# Patient Record
Sex: FEMALE | Race: WHITE | NOT HISPANIC OR LATINO | URBAN - METROPOLITAN AREA
[De-identification: names, ages, dates, MRNs, and addresses within clinical notes are randomized per-mention and may not be internally consistent; named-entity substitution may affect disease eponyms.]

---

## 2019-06-24 ENCOUNTER — INPATIENT (INPATIENT)
Facility: HOSPITAL | Age: 69
LOS: 3 days | Discharge: HOME | DRG: 54 | End: 2019-06-28
Attending: HOSPITALIST | Admitting: HOSPITALIST
Payer: MEDICARE

## 2019-06-24 VITALS
RESPIRATION RATE: 20 BRPM | DIASTOLIC BLOOD PRESSURE: 70 MMHG | SYSTOLIC BLOOD PRESSURE: 139 MMHG | HEIGHT: 62 IN | WEIGHT: 130.07 LBS | OXYGEN SATURATION: 98 % | TEMPERATURE: 98 F | HEART RATE: 87 BPM

## 2019-06-24 DIAGNOSIS — G93.9 DISORDER OF BRAIN, UNSPECIFIED: ICD-10-CM

## 2019-06-24 DIAGNOSIS — G56.00 CARPAL TUNNEL SYNDROME, UNSPECIFIED UPPER LIMB: Chronic | ICD-10-CM

## 2019-06-24 LAB
ALBUMIN SERPL ELPH-MCNC: 4.1 G/DL — SIGNIFICANT CHANGE UP (ref 3.3–5.2)
ALP SERPL-CCNC: 76 U/L — SIGNIFICANT CHANGE UP (ref 40–120)
ALT FLD-CCNC: 18 U/L — SIGNIFICANT CHANGE UP
ANION GAP SERPL CALC-SCNC: 13 MMOL/L — SIGNIFICANT CHANGE UP (ref 5–17)
APTT BLD: 28.4 SEC — SIGNIFICANT CHANGE UP (ref 27.5–36.3)
AST SERPL-CCNC: 23 U/L — SIGNIFICANT CHANGE UP
BASOPHILS # BLD AUTO: 0 K/UL — SIGNIFICANT CHANGE UP (ref 0–0.2)
BASOPHILS NFR BLD AUTO: 0.2 % — SIGNIFICANT CHANGE UP (ref 0–2)
BILIRUB SERPL-MCNC: 0.4 MG/DL — SIGNIFICANT CHANGE UP (ref 0.4–2)
BUN SERPL-MCNC: 16 MG/DL — SIGNIFICANT CHANGE UP (ref 8–20)
CALCIUM SERPL-MCNC: 9.5 MG/DL — SIGNIFICANT CHANGE UP (ref 8.6–10.2)
CHLORIDE SERPL-SCNC: 106 MMOL/L — SIGNIFICANT CHANGE UP (ref 98–107)
CK SERPL-CCNC: 83 U/L — SIGNIFICANT CHANGE UP (ref 25–170)
CO2 SERPL-SCNC: 24 MMOL/L — SIGNIFICANT CHANGE UP (ref 22–29)
CREAT SERPL-MCNC: 0.92 MG/DL — SIGNIFICANT CHANGE UP (ref 0.5–1.3)
EOSINOPHIL # BLD AUTO: 0.1 K/UL — SIGNIFICANT CHANGE UP (ref 0–0.5)
EOSINOPHIL NFR BLD AUTO: 0.6 % — SIGNIFICANT CHANGE UP (ref 0–6)
GLUCOSE SERPL-MCNC: 110 MG/DL — SIGNIFICANT CHANGE UP (ref 70–115)
HCT VFR BLD CALC: 44.6 % — SIGNIFICANT CHANGE UP (ref 37–47)
HGB BLD-MCNC: 14.9 G/DL — SIGNIFICANT CHANGE UP (ref 12–16)
INR BLD: 1.03 RATIO — SIGNIFICANT CHANGE UP (ref 0.88–1.16)
LACTATE BLDV-MCNC: 1.3 MMOL/L — SIGNIFICANT CHANGE UP (ref 0.5–2)
LYMPHOCYTES # BLD AUTO: 1.8 K/UL — SIGNIFICANT CHANGE UP (ref 1–4.8)
LYMPHOCYTES # BLD AUTO: 15.6 % — LOW (ref 20–55)
MCHC RBC-ENTMCNC: 31.5 PG — HIGH (ref 27–31)
MCHC RBC-ENTMCNC: 33.4 G/DL — SIGNIFICANT CHANGE UP (ref 32–36)
MCV RBC AUTO: 94.3 FL — SIGNIFICANT CHANGE UP (ref 81–99)
MONOCYTES # BLD AUTO: 1.2 K/UL — HIGH (ref 0–0.8)
MONOCYTES NFR BLD AUTO: 9.8 % — SIGNIFICANT CHANGE UP (ref 3–10)
NEUTROPHILS # BLD AUTO: 8.7 K/UL — HIGH (ref 1.8–8)
NEUTROPHILS NFR BLD AUTO: 73.5 % — HIGH (ref 37–73)
PLATELET # BLD AUTO: 339 K/UL — SIGNIFICANT CHANGE UP (ref 150–400)
POTASSIUM SERPL-MCNC: 4.1 MMOL/L — SIGNIFICANT CHANGE UP (ref 3.5–5.3)
POTASSIUM SERPL-SCNC: 4.1 MMOL/L — SIGNIFICANT CHANGE UP (ref 3.5–5.3)
PROT SERPL-MCNC: 8 G/DL — SIGNIFICANT CHANGE UP (ref 6.6–8.7)
PROTHROM AB SERPL-ACNC: 11.9 SEC — SIGNIFICANT CHANGE UP (ref 10–12.9)
RBC # BLD: 4.73 M/UL — SIGNIFICANT CHANGE UP (ref 4.4–5.2)
RBC # FLD: 14.4 % — SIGNIFICANT CHANGE UP (ref 11–15.6)
SODIUM SERPL-SCNC: 143 MMOL/L — SIGNIFICANT CHANGE UP (ref 135–145)
TROPONIN T SERPL-MCNC: <0.01 NG/ML — SIGNIFICANT CHANGE UP (ref 0–0.06)
WBC # BLD: 11.9 K/UL — HIGH (ref 4.8–10.8)
WBC # FLD AUTO: 11.9 K/UL — HIGH (ref 4.8–10.8)

## 2019-06-24 PROCEDURE — 71045 X-RAY EXAM CHEST 1 VIEW: CPT | Mod: 26

## 2019-06-24 PROCEDURE — 70552 MRI BRAIN STEM W/DYE: CPT | Mod: 26

## 2019-06-24 PROCEDURE — 99223 1ST HOSP IP/OBS HIGH 75: CPT

## 2019-06-24 PROCEDURE — 99291 CRITICAL CARE FIRST HOUR: CPT

## 2019-06-24 PROCEDURE — 70450 CT HEAD/BRAIN W/O DYE: CPT | Mod: 26

## 2019-06-24 PROCEDURE — 99222 1ST HOSP IP/OBS MODERATE 55: CPT

## 2019-06-24 PROCEDURE — 71260 CT THORAX DX C+: CPT | Mod: 26

## 2019-06-24 PROCEDURE — 99223 1ST HOSP IP/OBS HIGH 75: CPT | Mod: AI

## 2019-06-24 PROCEDURE — 74177 CT ABD & PELVIS W/CONTRAST: CPT | Mod: 26

## 2019-06-24 RX ORDER — CEFTRIAXONE 500 MG/1
2000 INJECTION, POWDER, FOR SOLUTION INTRAMUSCULAR; INTRAVENOUS ONCE
Refills: 0 | Status: COMPLETED | OUTPATIENT
Start: 2019-06-24 | End: 2019-06-24

## 2019-06-24 RX ORDER — SODIUM CHLORIDE 9 MG/ML
1000 INJECTION, SOLUTION INTRAVENOUS
Refills: 0 | Status: DISCONTINUED | OUTPATIENT
Start: 2019-06-24 | End: 2019-06-26

## 2019-06-24 RX ORDER — LEVETIRACETAM 250 MG/1
1000 TABLET, FILM COATED ORAL ONCE
Refills: 0 | Status: COMPLETED | OUTPATIENT
Start: 2019-06-24 | End: 2019-06-24

## 2019-06-24 RX ORDER — ASPIRIN/CALCIUM CARB/MAGNESIUM 324 MG
325 TABLET ORAL ONCE
Refills: 0 | Status: COMPLETED | OUTPATIENT
Start: 2019-06-24 | End: 2019-06-24

## 2019-06-24 RX ORDER — VANCOMYCIN HCL 1 G
1000 VIAL (EA) INTRAVENOUS ONCE
Refills: 0 | Status: COMPLETED | OUTPATIENT
Start: 2019-06-24 | End: 2019-06-24

## 2019-06-24 RX ORDER — LEVETIRACETAM 250 MG/1
500 TABLET, FILM COATED ORAL
Refills: 0 | Status: DISCONTINUED | OUTPATIENT
Start: 2019-06-24 | End: 2019-06-28

## 2019-06-24 RX ORDER — MECLIZINE HCL 12.5 MG
25 TABLET ORAL ONCE
Refills: 0 | Status: COMPLETED | OUTPATIENT
Start: 2019-06-24 | End: 2019-06-24

## 2019-06-24 RX ORDER — ACETAMINOPHEN 500 MG
650 TABLET ORAL EVERY 6 HOURS
Refills: 0 | Status: DISCONTINUED | OUTPATIENT
Start: 2019-06-24 | End: 2019-06-28

## 2019-06-24 RX ORDER — SODIUM CHLORIDE 9 MG/ML
1000 INJECTION INTRAMUSCULAR; INTRAVENOUS; SUBCUTANEOUS ONCE
Refills: 0 | Status: COMPLETED | OUTPATIENT
Start: 2019-06-24 | End: 2019-06-24

## 2019-06-24 RX ORDER — PANTOPRAZOLE SODIUM 20 MG/1
40 TABLET, DELAYED RELEASE ORAL
Refills: 0 | Status: DISCONTINUED | OUTPATIENT
Start: 2019-06-24 | End: 2019-06-28

## 2019-06-24 RX ADMIN — Medication 325 MILLIGRAM(S): at 10:38

## 2019-06-24 RX ADMIN — SODIUM CHLORIDE 1000 MILLILITER(S): 9 INJECTION INTRAMUSCULAR; INTRAVENOUS; SUBCUTANEOUS at 10:38

## 2019-06-24 RX ADMIN — Medication 250 MILLIGRAM(S): at 12:36

## 2019-06-24 RX ADMIN — SODIUM CHLORIDE 60 MILLILITER(S): 9 INJECTION, SOLUTION INTRAVENOUS at 19:12

## 2019-06-24 RX ADMIN — SODIUM CHLORIDE 1000 MILLILITER(S): 9 INJECTION INTRAMUSCULAR; INTRAVENOUS; SUBCUTANEOUS at 14:24

## 2019-06-24 RX ADMIN — CEFTRIAXONE 100 MILLIGRAM(S): 500 INJECTION, POWDER, FOR SOLUTION INTRAMUSCULAR; INTRAVENOUS at 12:32

## 2019-06-24 RX ADMIN — LEVETIRACETAM 400 MILLIGRAM(S): 250 TABLET, FILM COATED ORAL at 14:24

## 2019-06-24 RX ADMIN — CEFTRIAXONE 2000 MILLIGRAM(S): 500 INJECTION, POWDER, FOR SOLUTION INTRAMUSCULAR; INTRAVENOUS at 12:36

## 2019-06-24 RX ADMIN — Medication 1 MILLIGRAM(S): at 10:35

## 2019-06-24 RX ADMIN — Medication 25 MILLIGRAM(S): at 10:35

## 2019-06-24 RX ADMIN — Medication 1000 MILLIGRAM(S): at 15:01

## 2019-06-24 RX ADMIN — LEVETIRACETAM 500 MILLIGRAM(S): 250 TABLET, FILM COATED ORAL at 19:12

## 2019-06-24 NOTE — H&P ADULT - HISTORY OF PRESENT ILLNESS
69 y/o F with pmh Carpal tunnel syndrome, former smoker quit 7-8 years ago, and wrist fracture repair in R hand; as per patient she was in her usual stat of health she was feeling like she is walking wobbly and feel like about to fall down and that feeling was worsening, she recently went to [place] on cruise from 6/9/2019-6/, on the second day of the cruise she felt some motion sickness but attributed this to the cruise, States she has been on other cruises without symptoms, few years ago [place] went [place] but not recently, she has no fever, chills, headache, problem speaking, blurry or double vision, she had no nausea, vomiting, she did not fall down, she has no weakness or numbness of any specific area of the body.

## 2019-06-24 NOTE — ED PROVIDER NOTE - CLINICAL SUMMARY MEDICAL DECISION MAKING FREE TEXT BOX
The patient presents with off balance sensation and CT showing R sided necrotic mass and will admit for further evaluation

## 2019-06-24 NOTE — CONSULT NOTE ADULT - SUBJECTIVE AND OBJECTIVE BOX
HPI:  The patient is a 68 year old female presents with dizziness described motion sickness as the patient went to [place] cruise 2 weeks ago and came back 5 days ago.  The patient's  noticed the patient to be little off on the gait and the patient felt poor coordination of the left side of body. No HA, No fever No CP, No SOB, No Abd pain  Pt states that she did not have any symptoms till the cruise which occurred dates from June 12 thru 19.  Pt states that during this time she noticed being off balanced somewhat weak and disoriented.   Pt gives hx of weight loss 30lbs which she attributes to watching her diet. Pt denies fevers, chills or night sweats. Pt denies memory or visual changes, but describes herself as being disoriented.         PAST MEDICAL & SURGICAL HISTORY:  No pertinent past medical history  Carpal tunnel syndrome -carpal tunnel surg on the left hand  Wrist repaired after fall on ther right.       REVIEW OF SYSTEMS:    CONSTITUTIONAL: No fever, weight loss, or fatigue  EYES: No eye pain, visual disturbances, or discharge, wears readers.   ENMT:  No difficulty hearing, tinnitus, vertigo; No sinus or throat pain  NECK: No pain or stiffness  BREASTS: No pain, masses, or nipple discharge  RESPIRATORY: No cough, wheezing, chills or hemoptysis; No shortness of breath  CARDIOVASCULAR: No chest pain, palpitations, dizziness, or leg swelling  GASTROINTESTINAL: No abdominal or epigastric pain. No nausea, vomiting, or hematemesis; No diarrhea or constipation. No melena or hematochezia.  GENITOURINARY: No dysuria, frequency, hematuria, or incontinence  NEUROLOGICAL: No headaches, memory loss, loss of strength, numbness, or tremors  SKIN: No itching, burning, rashes, or lesions   LYMPH NODES: No enlarged glands  ENDOCRINE: No heat or cold intolerance; No hair loss  MUSCULOSKELETAL: No joint pain or swelling; No muscle, back, or extremity pain  PSYCHIATRIC: No depression, anxiety, mood swings, or difficulty sleeping  HEME/LYMPH: No easy bruising, or bleeding gums  ALLERY AND IMMUNOLOGIC: No hives or eczema    Allergies  No Known Allergies  Intolerances      MEDICATIONS  (STANDING):    MEDICATIONS  (PRN):  acetaminophen   Tablet .. 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 ex smoker 1 ppd quit 7 years.   FAMILY HISTORY: Mom pos blood pressure    Vital Signs Last 24 Hrs  T(C): 36.7 (24 Jun 2019 11:42), Max: 36.7 (24 Jun 2019 09:39)  T(F): 98.1 (24 Jun 2019 11:42), Max: 98.1 (24 Jun 2019 11:42)  HR: 87 (24 Jun 2019 14:25) (78 - 87)  BP: 122/70 (24 Jun 2019 14:25) (122/70 - 139/70)  BP(mean): --  RR: 18 (24 Jun 2019 14:25) (18 - 20)  SpO2: 99% (24 Jun 2019 14:25) (96% - 99%)    PHYSICAL EXAM:  GCS 15  GENERAL: NAD, well-groomed, well-developed  HEAD:  Atraumatic, Normocephalic  EYES: EOMI, PERRLA, conjunctiva and sclera clear,   ENMT: No tonsillar erythema, exudates, or enlargement; Moist mucous membranes, Good dentition, No lesions, no facials  NECK: Supple, No JVD, Normal thyroid  NERVOUS SYSTEM:  Alert & Oriented X3, Good concentration; Motor Strength 5/5 B/L upper and lower extremities; DTRs 2+ intact and symmetric  CHEST/LUNG: Clear to percussion bilaterally; No rales, rhonchi, wheezing, or rubs  HEART: Regular rate and rhythm; No murmurs, rubs, or gallops  ABDOMEN: Soft, Nontender, Nondistended; Bowel sounds present  EXTREMITIES:  2+ Peripheral Pulses, No clubbing, cyanosis, or edema  LYMPH: No lymphadenopathy noted  SKIN: No rashes or lesions    LABS:                        14.9   11.9  )-----------( 339      ( 24 Jun 2019 09:59 )             44.6     06-24    143  |  106  |  16.0  ----------------------------<  110  4.1   |  24.0  |  0.92    Ca    9.5      24 Jun 2019 09:59    TPro  8.0  /  Alb  4.1  /  TBili  0.4  /  DBili  x   /  AST  23  /  ALT  18  /  AlkPhos  76  06-24    PT/INR - ( 24 Jun 2019 09:59 )   PT: 11.9 sec;   INR: 1.03 ratio         PTT - ( 24 Jun 2019 09:59 )  PTT:28.4 sec      RADIOLOGY & ADDITIONAL STUDIES:  ~~~~~~~~~~~~~~~~~~~~~~~~~~~~~~ HPI:  The patient is a 68 year old female presents with dizziness described motion sickness as the patient went to [place] cruise 2 weeks ago and came back 5 days ago.  The patient's  noticed the patient to be little off on the gait and the patient felt poor coordination of the left side of body. No HA, No fever No CP, No SOB, No Abd pain  Pt states that she did not have any symptoms till the cruise which occurred dates from June 12 thru 19.  Pt states that during this time she noticed being off balanced somewhat weak and disoriented.   Pt gives hx of weight loss 30lbs which she attributes to watching her diet. Pt denies fevers, chills or night sweats. Pt denies memory or visual changes, but describes herself as being disoriented.         PAST MEDICAL & SURGICAL HISTORY:  No pertinent past medical history  Carpal tunnel syndrome -carpal tunnel surg on the left hand  Wrist repaired after fall on ther right.       REVIEW OF SYSTEMS:    CONSTITUTIONAL: No fever, weight loss, or fatigue  EYES: No eye pain, visual disturbances, or discharge, wears readers.   ENMT:  No difficulty hearing, tinnitus, vertigo; No sinus or throat pain  NECK: No pain or stiffness  BREASTS: No pain, masses, or nipple discharge  RESPIRATORY: No cough, wheezing, chills or hemoptysis; No shortness of breath  CARDIOVASCULAR: No chest pain, palpitations, dizziness, or leg swelling  GASTROINTESTINAL: No abdominal or epigastric pain. No nausea, vomiting, or hematemesis; No diarrhea or constipation. No melena or hematochezia.  GENITOURINARY: No dysuria, frequency, hematuria, or incontinence  NEUROLOGICAL: No headaches, memory loss, loss of strength, numbness, or tremors  SKIN: No itching, burning, rashes, or lesions   LYMPH NODES: No enlarged glands  ENDOCRINE: No heat or cold intolerance; No hair loss  MUSCULOSKELETAL: No joint pain or swelling; No muscle, back, or extremity pain  PSYCHIATRIC: No depression, anxiety, mood swings, or difficulty sleeping  HEME/LYMPH: No easy bruising, or bleeding gums  ALLERY AND IMMUNOLOGIC: No hives or eczema    Allergies  No Known Allergies  Intolerances      MEDICATIONS  (STANDING):    MEDICATIONS  (PRN):  acetaminophen   Tablet .. 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 ex smoker 1 ppd quit 7 years.   FAMILY HISTORY: Mom pos blood pressure    Vital Signs Last 24 Hrs  T(C): 36.7 (24 Jun 2019 11:42), Max: 36.7 (24 Jun 2019 09:39)  T(F): 98.1 (24 Jun 2019 11:42), Max: 98.1 (24 Jun 2019 11:42)  HR: 87 (24 Jun 2019 14:25) (78 - 87)  BP: 122/70 (24 Jun 2019 14:25) (122/70 - 139/70)  BP(mean): --  RR: 18 (24 Jun 2019 14:25) (18 - 20)  SpO2: 99% (24 Jun 2019 14:25) (96% - 99%)    PHYSICAL EXAM:  GCS 15  GENERAL: NAD, well-groomed, well-developed  HEAD:  Atraumatic, Normocephalic  EYES: EOMI, PERRLA, conjunctiva and sclera clear,   ENMT: No tonsillar erythema, exudates, or enlargement; Moist mucous membranes, Good dentition, No lesions, no facials  NECK: Supple, No JVD, Normal thyroid  NERVOUS SYSTEM:  Alert & Oriented X3, Good concentration; Motor Strength 5/5 B/L upper and lower extremities; DTRs 2+ intact and symmetric, pos dysmetria of the left side.  neg pronators. pos finger to nose left. neg sensory.  CHEST/LUNG: Clear BS bilaterally; No rales, rhonchi, wheezing, or rubs  HEART: Regular rate and rhythm; No murmurs, rubs, or gallops  ABDOMEN: Soft, Nontender, Nondistended; Bowel sounds present  EXTREMITIES:  2+ Peripheral Pulses, No clubbing, cyanosis, or edema  LYMPH: No lymphadenopathy noted  SKIN: No rashes or lesions    LABS:                        14.9   11.9  )-----------( 339      ( 24 Jun 2019 09:59 )             44.6     06-24    143  |  106  |  16.0  ----------------------------<  110  4.1   |  24.0  |  0.92    Ca    9.5      24 Jun 2019 09:59    TPro  8.0  /  Alb  4.1  /  TBili  0.4  /  DBili  x   /  AST  23  /  ALT  18  /  AlkPhos  76  06-24    PT/INR - ( 24 Jun 2019 09:59 )   PT: 11.9 sec;   INR: 1.03 ratio         PTT - ( 24 Jun 2019 09:59 )  PTT:28.4 sec      RADIOLOGY & ADDITIONAL STUDIES:  ~~~~~~~~~~~~~~~~~~~~~~~~~~~~~~  < from: CT Head No Cont (06.24.19 @ 11:21) >   EXAM:  CT BRAIN                        PROCEDURE DATE:  06/24/2019    <IMPRESSION:   A 4.6 cm cystic/necrotic mass with nodular components in the right   posterior frontal and parietal region with involvement of the right   splenium of the corpus callosum. Extensive surrounding vasogenic edema.   Right to left midline shift measuring 2 mm.  < end of copied text >    < from: MR Head w/ IV Cont (06.24.19 @ 14:09) >  IMPRESSION:   A cystic/necrotic, hemorrhagic mass with enhancing nodularity in the   right posterior frontal and parietal region, with involvement of the   splenium of the corpus callosum and crossing the midline. Extensive   T2/FLAIR hyperintensity surrounding this lesion suggesting vasogenic   edema and/or nonenhancing tumor. Two smaller enhancing lesions in the   right frontal lobe and left occipital lobe. Findings suggest primary   malignancy such as GBM versus metastatic disease.  < end of copied text >    < from: Xray Chest 1 View-PORTABLE IMMEDIATE (06.24.19 @ 11:36) >   EXAM:  XR CHEST PORTABLE IMMED 1V                        PROCEDURE DATE:  06/24/2019    IMPRESSION: There is right lower lobe opacification compatible with   pneumonia. The leftlung is clear. The heart is normal in size.  < end of copied text >

## 2019-06-24 NOTE — ED ADULT NURSE NOTE - OBJECTIVE STATEMENT
dizzy, feels off balance, no chest pain, no dyspnea. Pt ambulates well. Recent cruise and air travel. No cardiac history.

## 2019-06-24 NOTE — H&P ADULT - NSHPPHYSICALEXAM_GEN_ALL_CORE
Vital Signs Last 24 Hrs  T(C): 37 (24 Jun 2019 16:14), Max: 37 (24 Jun 2019 16:14)  T(F): 98.6 (24 Jun 2019 16:14), Max: 98.6 (24 Jun 2019 16:14)  HR: 74 (24 Jun 2019 16:14) (74 - 87)  BP: 142/90 (24 Jun 2019 16:14) (122/70 - 142/90)  BP(mean): --  RR: 18 (24 Jun 2019 16:14) (18 - 20)  SpO2: 97% (24 Jun 2019 16:14) (96% - 99%)    PHYSICAL EXAM:    GENERAL: Elderly female looking comfortable   HEENT: PERRL, +EOMI  NECK: soft, Supple, No JVD,   CHEST/LUNG: Clear to auscultate bilaterally; No wheezing  HEART: S1S2+, Regular rate and rhythm; No murmurs  ABDOMEN: Soft, Nontender, Nondistended; Bowel sounds present  EXTREMITIES:  2+ Peripheral Pulses, No edema  SKIN: No rashes or lesions  NEURO: AAOX3, no focal deficits, no motor r sensory loss, imbalance with walker as per  PSYCH: normal mood Vital Signs Last 24 Hrs  T(C): 37 (24 Jun 2019 16:14), Max: 37 (24 Jun 2019 16:14)  T(F): 98.6 (24 Jun 2019 16:14), Max: 98.6 (24 Jun 2019 16:14)  HR: 74 (24 Jun 2019 16:14) (74 - 87)  BP: 142/90 (24 Jun 2019 16:14) (122/70 - 142/90)  RR: 18 (24 Jun 2019 16:14) (18 - 20)  SpO2: 97% (24 Jun 2019 16:14) (96% - 99%)    PHYSICAL EXAM:    GENERAL: Elderly female looking comfortable   HEENT: PERRL, +EOMI  NECK: soft, Supple, No JVD,   CHEST/LUNG: Clear to auscultate bilaterally; No wheezing  HEART: S1S2+, Regular rate and rhythm; No murmurs  ABDOMEN: Soft, Nontender, Nondistended; Bowel sounds present  EXTREMITIES:  2+ Peripheral Pulses, No edema  SKIN: No rashes or lesions  NEURO: AAOX3, no focal deficits, no motor r sensory loss, imbalance with walker as per ED physician.   PSYCH: normal mood

## 2019-06-24 NOTE — ED PROVIDER NOTE - OBJECTIVE STATEMENT
The patient is a 68 year old female presents with dizziness described motion sickness as the patient went to [place] 2 weeks ago and came back 5 days ago.  The patient's  noticed the patient to be little off on the gait and the patient felt poor coordination of the left side of body. No HA, No fever No CP, No SOB, No Abd pain

## 2019-06-24 NOTE — CONSULT NOTE ADULT - SUBJECTIVE AND OBJECTIVE BOX
[facility]  INFECTIOUS DISEASES AND INTERNAL MEDICINE at [facility]  =======================================================  Rc Elizabeth MD  Diplomates American Board of Internal Medicine and Infectious Diseases  Tel: [phone]      Fax: [phone]  =======================================================      [id number]  DHARA MARSH     CC: Patient is a 68y old  Female who presents with a chief complaint of cystic mass in brain (24 Jun 2019 15:15)    67y/o  Female with no significant medical history. Patient presents with dizziness initially starting about 2 weeks back when she travelled to [place] for the [place]. Patient was noted to be off balance with poor coordination. No fever or chills, headaches. Patient thought she was having symptoms of motion sickness. Patient also reports weight loss 30lbs which she attributes to watching her diet. In the ER patient was afebrile, leukocytosis to 11.9k. She was given a dose of Vancomycin and Ceftriaxone. ID input requested.       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  No earache, sore throat or runny nose.  CARDIOVASCULAR:  No pressure, squeezing, strangling, tightness, heaviness or aching about the chest, neck, axilla or epigastrium.  RESPIRATORY:  No cough, shortness of breath  GASTROINTESTINAL:  No nausea, vomiting or diarrhea.  GENITOURINARY:  No dysuria, frequency or urgency.   MUSCULOSKELETAL:  no joint aches, no muscle pain  SKIN:  No change in skin, hair or nails.  NEUROLOGIC:  No Headaches, seizures or weakness.  PSYCHIATRIC:  No disorder of thought or mood.  ENDOCRINE:  No heat or cold intolerance  HEMATOLOGICAL:  No easy bruising or bleeding.       Physical Exam:  Vital Signs Last 24 Hrs  T(C): 37 (24 Jun 2019 16:14), Max: 37 (24 Jun 2019 16:14)  T(F): 98.6 (24 Jun 2019 16:14), Max: 98.6 (24 Jun 2019 16:14)  HR: 74 (24 Jun 2019 16:14) (74 - 87)  BP: 142/90 (24 Jun 2019 16:14) (122/70 - 142/90)  RR: 18 (24 Jun 2019 16:14) (18 - 20)  SpO2: 97% (24 Jun 2019 16:14) (96% - 99%)  Height (cm): 157.48 (06-24 @ 09:39)  Weight (kg): 59 (06-24 @ 09:39)  BMI (kg/m2): 23.8 (06-24 @ 09:39)  BSA (m2): 1.59 (06-24 @ 09:39)      GEN: NAD, pleasant  HEENT: normocephalic and atraumatic. EOMI. PERRL.  Anicteric  NECK: Supple.   LUNGS: Clear to auscultation.  HEART: Regular rate and rhythm   ABDOMEN: Soft, nontender, and nondistended.  Positive bowel sounds.    : No CVA tenderness  EXTREMITIES: Without any edema.  MSK: No joint swelling  NEUROLOGIC:  No Focal Deficits  PSYCHIATRIC: Appropriate affect .  SKIN: No Rash      Labs:  06-24    143  |  106  |  16.0  ----------------------------<  110  4.1   |  24.0  |  0.92    Ca    9.5      24 Jun 2019 09:59    TPro  8.0  /  Alb  4.1  /  TBili  0.4  /  DBili  x   /  AST  23  /  ALT  18  /  AlkPhos  76  06-24                          14.9   11.9  )-----------( 339      ( 24 Jun 2019 09:59 )             44.6       PT/INR - ( 24 Jun 2019 09:59 )   PT: 11.9 sec;   INR: 1.03 ratio         PTT - ( 24 Jun 2019 09:59 )  PTT:28.4 sec    LIVER FUNCTIONS - ( 24 Jun 2019 09:59 )  Alb: 4.1 g/dL / Pro: 8.0 g/dL / ALK PHOS: 76 U/L / ALT: 18 U/L / AST: 23 U/L / GGT: x           CARDIAC MARKERS ( 24 Jun 2019 09:59 )  x     / <0.01 ng/mL / 83 U/L / x     / x            EXAM:  MR BRAIN IC                        PROCEDURE DATE:  06/24/2019    INTERPRETATION:  CLINICAL INFORMATION: mass vs infection. . ADMDIAG1:   G93.9 DISORDER OF BRAIN, UNSPECIFIED/.  TECHNIQUE: Multiplanar, multisequence brain MRI was performed without   intravenous contrast  COMPARISON: CT head 6/24/2019.  FINDINGS:  Some images are degraded by motion.  Redemonstration of a cystic/necrotic mass with enhancing nodularity in   the right posterior frontal and parietal region measuring 4.8 x 3.8 x 3.2   cm. The enhancing nodular component along the medial aspect of the mass   involves the splenium of the corpus callosum and crosses the midline.   There is extensive T2/FLAIR hyperintensity surrounding this mass   suggestingvasogenic edema and/or nonenhancing tumor. There are foci of   susceptibility artifact in the lesion compatible with blood products. The   cystic component of the mass contains layering hemorrhage. The solid   enhancing component of this lesion demonstrates restricted diffusion   suggesting hypercellularity.  Two smaller enhancing lesions are noted,   one in the high right frontal lobe measuring 0.8 x 0.7 x 0.9 cm (10:26)   and the other in the left occipital lobe measuring 0.5 x 0.4 x 0.4 cm (10:13).   There is cerebral volume loss.  There is no evidence of acute infarct.   Scattered foci of T2/FLAIR hyperintensity in the bilateral   periventricular white matter are nonspecific but likely related to   chronic white matter microvascular ischemic disease.  Flow voids of the major intracranial vessels at the skull base follow   expected course and contour.  Mild bilateral maxillary sinus mucosal thickening. Mild bilateral mastoid   mucosal thickening. Bilateral orbits are within normal limits.  IMPRESSION:   A cystic/necrotic, hemorrhagic mass with enhancing nodularity in the   right posterior frontal and parietal region, with involvement of the   splenium of the corpus callosum and crossing the midline. Extensive   T2/FLAIR hyperintensity surrounding this lesion suggesting vasogenic   edema and/or nonenhancing tumor. Two smaller enhancing lesions in the   right frontal lobe and left occipital lobe. Findings suggest primary   malignancy such as GBM versus metastatic disease.

## 2019-06-24 NOTE — CONSULT NOTE ADULT - ATTENDING COMMENTS
NSGY Attg:    see above    patient seen    agree with exam as above    CT and MRI reviewed; CT torso pending    I discussed the clinical case and imaging findings with the patient's  at the bedside and answered all his questions.

## 2019-06-24 NOTE — CONSULT NOTE ADULT - SUBJECTIVE AND OBJECTIVE BOX
REASON FOR CONSULT: admission initiation    SUBJECTIVE:  69 yo F with pmh ...  who p/w worsening dizziness and gait stability for past...         OBJECTIVE  ROS: unremarkable except that stated in HPI    PHYSICAL EXAM: Tele-evaluation precludes physical exam. Pertinent physical exam findings as per verbal communication by …… and nurse at bedside are as following:  (***)    LABS AND IMAGING DATA:                        14.9   11.9  )-----------( 339      ( 24 Jun 2019 09:59 )             44.6     06-24    143  |  106  |  16.0  ----------------------------<  110  4.1   |  24.0  |  0.92    Ca    9.5      24 Jun 2019 09:59    TPro  8.0  /  Alb  4.1  /  TBili  0.4  /  DBili  x   /  AST  23  /  ALT  18  /  AlkPhos  76  06-24          ASSESSMENT AND PLAN: (***)    Care plan discussed with (***) REASON FOR CONSULT: admission initiation    SUBJECTIVE:  67 yo F with pmh Carpal tunnel syndrome, former smoker quit 7-8 years ago, and wrist fracture repair in R hand;  who p/w worsening balance and motion-like sickness since last tuesday. Pt was recently in [place] for a cruise from 6/9/2019-6/, on the second day of the cruise she felt some motion sickness but attributed this to the cruise. States she has been on other cruises without symptoms. Pt states she sees Dr. Bhagat in [facility] for her primary care needs. Pt is accompanied by her  who is also her HCP at bedside.     In the ED pt had CT head, as well as a dose of keppra.       OBJECTIVE  ROS: unremarkable except that stated in HPI    PHYSICAL EXAM: Tele-evaluation precludes physical exam. Pertinent physical exam findings as per verbal communication by patient and  at bedside are as following: AAOX3, pleasant and cooperative in history, able to speak in full sentences without difficulty.     LABS AND IMAGING DATA:                        14.9   11.9  )-----------( 339      ( 24 Jun 2019 09:59 )             44.6     06-24    143  |  106  |  16.0  ----------------------------<  110  4.1   |  24.0  |  0.92    Ca    9.5      24 Jun 2019 09:59    TPro  8.0  /  Alb  4.1  /  TBili  0.4  /  DBili  x   /  AST  23  /  ALT  18  /  AlkPhos  76  06-24      CT Head: 4.6 cm cystic/necrotic mass with nodular components in the right   posterior frontal and parietal region with involvement of the right   splenium of the corpus callosum. Extensive surrounding vasogenic edema.   Right to left midline shift measuring 2 mm. REASON FOR CONSULT: admission initiation    SUBJECTIVE:  69 yo F with pmh Carpal tunnel syndrome, former smoker quit 7-8 years ago, and wrist fracture repair in R hand;  who p/w worsening balance and motion-like sickness since last tuesday. Pt was recently in [place] for a cruise from 6/9/2019-6/, on the second day of the cruise she felt some motion sickness but attributed this to the cruise. States she has been on other cruises without symptoms. Pt states she sees Dr. Bhagat in [facility] for her primary care needs. Pt has had all her health maintence check ups which have been unremarkable until this point.  Pt is accompanied by her  who is also her HCP at bedside.     In the ED pt had CT head, as well as a dose of keppra.       OBJECTIVE  ROS: unremarkable except that stated in HPI    PHYSICAL EXAM: Tele-evaluation precludes physical exam. Pertinent physical exam findings as per verbal communication by patient and  at bedside are as following: AAOX3, pleasant and cooperative in history, able to speak in full sentences without difficulty.     LABS AND IMAGING DATA:                        14.9   11.9  )-----------( 339      ( 24 Jun 2019 09:59 )             44.6     06-24    143  |  106  |  16.0  ----------------------------<  110  4.1   |  24.0  |  0.92    Ca    9.5      24 Jun 2019 09:59    TPro  8.0  /  Alb  4.1  /  TBili  0.4  /  DBili  x   /  AST  23  /  ALT  18  /  AlkPhos  76  06-24      CT Head: 4.6 cm cystic/necrotic mass with nodular components in the right   posterior frontal and parietal region with involvement of the right   splenium of the corpus callosum. Extensive surrounding vasogenic edema.   Right to left midline shift measuring 2 mm. REASON FOR CONSULT: admission initiation    SUBJECTIVE:  67 yo F with pmh Carpal tunnel syndrome, former smoker quit 7-8 years ago, and wrist fracture repair in R hand;  who p/w worsening balance and motion-like sickness since last tuesday. Pt was recently in [place] for a cruise from 6/9/2019-6/, on the second day of the cruise she felt some motion sickness but attributed this to the cruise. States she has been on other cruises without symptoms. Pt states she sees Dr. Bhagat in [facility] for her primary care needs. Pt has had all her health maintence check ups which have been unremarkable until this point.  Pt is accompanied by her  who is also her HCP at bedside.     In the ED pt had CT head, as well as a dose of keppra.       OBJECTIVE  ROS: unremarkable except that stated in HPI    PHYSICAL EXAM: Tele-evaluation precludes physical exam. Pertinent physical exam findings as per verbal communication by patient and  at bedside are as following: AAOX3, pleasant and cooperative in history, able to speak in full sentences without difficulty.       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 Extensive surrounding vasogenic edema.   Right to left midline shift measuring 2 mm.

## 2019-06-24 NOTE — H&P ADULT - ASSESSMENT
68yf Right handed presented ED with Left sided uncoordination disoriented at time and overall feeling weak.  Pt just got back from Cruise and initially attributed the symptoms to being sea sick.   ct/ MRI of the brain demonstrates a cystic necrotic hemorrhagic mass with nodularity in the right posterior frontal parietal region involvement splenium or the corpus callosum crossing midline.  Positive extensive vasogenic edema. @ additional small enhancing lesion noted in the right frontal and left occipital lobe.     chest xray pos for right lower lobe opacification ? pneumonia 69 y/o F with pmh Carpal tunnel syndrome, former smoker quit 7-8 years ago, and wrist fracture repair in R hand; as per patient she was in her usual stat of health she was feeling like she is walking wobbly and feel like about to fall down and that feeling was worsening, she recently went to [place] on cruise from 6/9/2019-6/, on the second day of the cruise she felt some motion sickness but attributed this to the cruise, States she has been on other cruises without symptoms, few years ago she went to [employer] but not recently, she has no fever, chills, headache, problem speaking, blurry or double vision, she had no nausea, vomiting, she did not fall down, she has no weakness or numbness of any specific area of the body.    ct/ MRI of the brain demonstrates a cystic necrotic hemorrhagic mass with nodularity in the right posterior frontal parietal region involvement splenium or the corpus callosum crossing midline.  Positive extensive vasogenic edema. @ additional small enhancing lesion noted in the right frontal and left occipital lobe, chest xray pos for right lower lobe opacification ? pneumonia/mass    Plan:     Cystic brain mass: Admit in step down, Q2 Neuo checks, Kepra 500mg BID, Neruo surgical eval, ID eval to r/o infectious reason, will get HIV test verbal consent obtained from the patient, Onocology consulted, CT of the chest/abdomen/pelvis with IV contrast, seizure precaution, fall precaution, PT consult.   will hold of on IV steriods for now as discussed with Neuro surgery.     Opacities on the lung XR ?Pneumonia:  patient has no cough, no fever, no sob, will get CT chest to under stand more, blood cultures obtained, will hold antibiotics.     DVT prophylaxis: SCDs for now.      Addendum: Patient CT of the chest showed: There is a large RIGHT lower lobe lobulated heterogeneous hypervascular mass extending from the hilum to the posterior sulcus of RIGHT lower lobe measuring 9.3 x 4.0 x 7.8 cm and concerning for adenocarcinoma of the RIGHT lower lobe. The mass extends extends into the RIGHT hilum encasing the RIGHT lower lobe pulmonary arteries and occluding the RIGHT lower lobe bronchus. Peripheral basilar satellite lesions noted adjacent to the main mass, will keep her NPO mid night for IR guided biopsy in am after discussion with Interventional radiologist.

## 2019-06-24 NOTE — H&P ADULT - NSHPREVIEWOFSYSTEMS_GEN_ALL_CORE
CONSTITUTIONAL: No fever, some fatigue  RESPIRATORY: No cough,  No shortness of breath  CARDIOVASCULAR: No chest pain, palpitations  GASTROINTESTINAL: No abdominal, No nausea, vomiting  NEUROLOGICAL: gait disturbance.  MISCELLANEOUS: No joint swelling or pain CONSTITUTIONAL: No fever, some fatigue  RESPIRATORY: No cough,  No shortness of breath  CARDIOVASCULAR: No chest pain, palpitations  GASTROINTESTINAL: No abdominal, No nausea, vomiting  NEUROLOGICAL: gait disturbance.  MISCELLANEOUS: No joint swelling or pain  all systems reviewed negative except mentioned above.

## 2019-06-24 NOTE — H&P ADULT - NSHPLABSRESULTS_GEN_ALL_CORE
14.9   11.9  )-----------( 339      ( 24 Jun 2019 09:59 )             44.6     06-24    143  |  106  |  16.0  ----------------------------<  110  4.1   |  24.0  |  0.92    Ca    9.5      24 Jun 2019 09:59    TPro  8.0  /  Alb  4.1  /  TBili  0.4  /  DBili  x   /  AST  23  /  ALT  18  /  AlkPhos  76  06-24    PT/INR - ( 24 Jun 2019 09:59 )   PT: 11.9 sec;   INR: 1.03 ratio         PTT - ( 24 Jun 2019 09:59 )  PTT:28.4 sec    patient had ct/ MRI of the brain demonstrates a cystic necrotic hemorrhagic mass with nodularity in the right posterior frontal parietal region involvement splenium or the corpus callosum crossing midline.  Positive extensive vasogenic edema. @ additional small enhancing lesion noted in the right frontal and left occipital lobe.     chest xray pos for right lower lobe opacification ? pneumonia

## 2019-06-24 NOTE — CONSULT NOTE ADULT - NSHPATTENDINGPLANDISCUSS_GEN_ALL_CORE
Dr Meraz
Dr Meraz onsite accepting/admitting hospitalist, Dr Laurent (ED attending)
attending hosp, ed attending and patient and

## 2019-06-24 NOTE — ED PROVIDER NOTE - CHPI ED SYMPTOMS NEG
no fever/no vomiting/no nausea/no weakness/no numbness/no change in level of consciousness/no blurred vision/no confusion/no loss of consciousness

## 2019-06-25 PROBLEM — Z00.00 ENCOUNTER FOR PREVENTIVE HEALTH EXAMINATION: Status: ACTIVE | Noted: 2019-06-25

## 2019-06-25 LAB
ALBUMIN SERPL ELPH-MCNC: 3.2 G/DL — LOW (ref 3.3–5.2)
ALP SERPL-CCNC: 63 U/L — SIGNIFICANT CHANGE UP (ref 40–120)
ALT FLD-CCNC: 14 U/L — SIGNIFICANT CHANGE UP
ANION GAP SERPL CALC-SCNC: 12 MMOL/L — SIGNIFICANT CHANGE UP (ref 5–17)
AST SERPL-CCNC: 17 U/L — SIGNIFICANT CHANGE UP
BASOPHILS # BLD AUTO: 0 K/UL — SIGNIFICANT CHANGE UP (ref 0–0.2)
BASOPHILS NFR BLD AUTO: 0.3 % — SIGNIFICANT CHANGE UP (ref 0–2)
BILIRUB SERPL-MCNC: 0.4 MG/DL — SIGNIFICANT CHANGE UP (ref 0.4–2)
BUN SERPL-MCNC: 11 MG/DL — SIGNIFICANT CHANGE UP (ref 8–20)
CALCIUM SERPL-MCNC: 8.5 MG/DL — LOW (ref 8.6–10.2)
CHLORIDE SERPL-SCNC: 108 MMOL/L — HIGH (ref 98–107)
CO2 SERPL-SCNC: 23 MMOL/L — SIGNIFICANT CHANGE UP (ref 22–29)
CREAT SERPL-MCNC: 0.84 MG/DL — SIGNIFICANT CHANGE UP (ref 0.5–1.3)
EOSINOPHIL # BLD AUTO: 0.2 K/UL — SIGNIFICANT CHANGE UP (ref 0–0.5)
EOSINOPHIL NFR BLD AUTO: 1.8 % — SIGNIFICANT CHANGE UP (ref 0–6)
GLUCOSE SERPL-MCNC: 89 MG/DL — SIGNIFICANT CHANGE UP (ref 70–115)
HCT VFR BLD CALC: 38.7 % — SIGNIFICANT CHANGE UP (ref 37–47)
HGB BLD-MCNC: 12.7 G/DL — SIGNIFICANT CHANGE UP (ref 12–16)
HIV 1 & 2 AB SERPL IA.RAPID: SIGNIFICANT CHANGE UP
INR BLD: 1.09 RATIO — SIGNIFICANT CHANGE UP (ref 0.88–1.16)
LDH SERPL L TO P-CCNC: 243 U/L — HIGH (ref 98–192)
LYMPHOCYTES # BLD AUTO: 2.7 K/UL — SIGNIFICANT CHANGE UP (ref 1–4.8)
LYMPHOCYTES # BLD AUTO: 27.9 % — SIGNIFICANT CHANGE UP (ref 20–55)
MCHC RBC-ENTMCNC: 30.8 PG — SIGNIFICANT CHANGE UP (ref 27–31)
MCHC RBC-ENTMCNC: 32.8 G/DL — SIGNIFICANT CHANGE UP (ref 32–36)
MCV RBC AUTO: 93.9 FL — SIGNIFICANT CHANGE UP (ref 81–99)
MONOCYTES # BLD AUTO: 1 K/UL — HIGH (ref 0–0.8)
MONOCYTES NFR BLD AUTO: 10.3 % — HIGH (ref 3–10)
NEUTROPHILS # BLD AUTO: 5.7 K/UL — SIGNIFICANT CHANGE UP (ref 1.8–8)
NEUTROPHILS NFR BLD AUTO: 59.6 % — SIGNIFICANT CHANGE UP (ref 37–73)
PLATELET # BLD AUTO: 275 K/UL — SIGNIFICANT CHANGE UP (ref 150–400)
POTASSIUM SERPL-MCNC: 4 MMOL/L — SIGNIFICANT CHANGE UP (ref 3.5–5.3)
POTASSIUM SERPL-SCNC: 4 MMOL/L — SIGNIFICANT CHANGE UP (ref 3.5–5.3)
PROCALCITONIN SERPL-MCNC: 0.16 NG/ML — HIGH (ref 0.02–0.1)
PROT SERPL-MCNC: 6.6 G/DL — SIGNIFICANT CHANGE UP (ref 6.6–8.7)
PROTHROM AB SERPL-ACNC: 12.6 SEC — SIGNIFICANT CHANGE UP (ref 10–12.9)
RBC # BLD: 4.12 M/UL — LOW (ref 4.4–5.2)
RBC # FLD: 14.3 % — SIGNIFICANT CHANGE UP (ref 11–15.6)
SODIUM SERPL-SCNC: 143 MMOL/L — SIGNIFICANT CHANGE UP (ref 135–145)
WBC # BLD: 9.6 K/UL — SIGNIFICANT CHANGE UP (ref 4.8–10.8)
WBC # FLD AUTO: 9.6 K/UL — SIGNIFICANT CHANGE UP (ref 4.8–10.8)

## 2019-06-25 PROCEDURE — 99232 SBSQ HOSP IP/OBS MODERATE 35: CPT

## 2019-06-25 PROCEDURE — 99233 SBSQ HOSP IP/OBS HIGH 50: CPT

## 2019-06-25 RX ORDER — DEXAMETHASONE 0.5 MG/5ML
4 ELIXIR ORAL EVERY 6 HOURS
Refills: 0 | Status: DISCONTINUED | OUTPATIENT
Start: 2019-06-25 | End: 2019-06-28

## 2019-06-25 RX ADMIN — SODIUM CHLORIDE 60 MILLILITER(S): 9 INJECTION, SOLUTION INTRAVENOUS at 05:15

## 2019-06-25 RX ADMIN — LEVETIRACETAM 500 MILLIGRAM(S): 250 TABLET, FILM COATED ORAL at 05:15

## 2019-06-25 RX ADMIN — PANTOPRAZOLE SODIUM 40 MILLIGRAM(S): 20 TABLET, DELAYED RELEASE ORAL at 05:15

## 2019-06-25 RX ADMIN — SODIUM CHLORIDE 60 MILLILITER(S): 9 INJECTION, SOLUTION INTRAVENOUS at 18:23

## 2019-06-25 RX ADMIN — LEVETIRACETAM 500 MILLIGRAM(S): 250 TABLET, FILM COATED ORAL at 18:21

## 2019-06-25 RX ADMIN — Medication 4 MILLIGRAM(S): at 18:21

## 2019-06-25 NOTE — PHYSICAL THERAPY INITIAL EVALUATION ADULT - GAIT DEVIATIONS NOTED, PT EVAL
footdrop/decreased stride length/increased stride width/decreased weight-shifting ability/decreased step length

## 2019-06-25 NOTE — PHYSICAL THERAPY INITIAL EVALUATION ADULT - ADDITIONAL COMMENTS
Pt lives with  in a 2 story house with 2 steps to enter.  Independent with all, PTA, without devices.

## 2019-06-25 NOTE — PROGRESS NOTE ADULT - ASSESSMENT
69y/o  Female with no significant medical history. Patient presents with dizziness initially starting about 2 weeks back when she travelled to [place] [employer]. Patient was noted to be off balance with poor coordination. No fever or chills, headaches. Patient thought she was having symptoms of motion sickness. Patient also reports weight loss 30lbs which she attributes to watching her diet. In the ER patient was afebrile, leukocytosis to 11.9k      Brain mass  Lung mass  Leukocytosis      - Blood cultures pending  - MRI brain with cystic/necrotic, hemorrhagic mass  - CT Chest with lung mass  - HIV negative  - Hep C pending  - Procalcitonin level 0.16 not suggestive of infection   - Monitor off antibiotics   - Trend Fever  - Trend Leukocytosis  - Neurosurgery following      Will Follow

## 2019-06-25 NOTE — PROGRESS NOTE ADULT - ASSESSMENT
67 y/o F with pmh Carpal tunnel syndrome, former smoker quit 7-8 years ago, and wrist fracture repair in R hand; as per patient she was in her usual stat of health she was feeling like she is walking wobbly and feel like about to fall down and that feeling was worsening, she recently went to [place] on cruise from 6/9/2019-6/, on the second day of the cruise she felt some motion sickness but attributed this to the cruise, States she has been on other cruises without symptoms, few years ago she went to [employer] but not recently, she has no fever, chills, headache, problem speaking, blurry or double vision, she had no nausea, vomiting, she did not fall down, she has no weakness or numbness of any specific area of the body, ct/ MRI of the brain demonstrates a cystic necrotic hemorrhagic mass with nodularity in the right posterior frontal parietal region involvement splenium or the corpus callosum crossing midline.  Positive extensive vasogenic edema. @ additional small enhancing lesion noted in the right frontal and left occipital lobe, chest xray pos for right lower lobe opacification ? pneumonia/mass    Plan:     Cystic brain mass: Admit in step down, Q2 Neuo checks, Kepra 500mg BID, Neruo surgical eval, ID eval to r/o infectious reason, will get HIV test verbal consent obtained from the patient, Onocology consult appreciated, CT of the chest/abdomen/pelvis with IV contrast done, seizure precaution, fall precaution, PT consult, will change Neuro checks as per neurosurgery team  will hold of on IV steriods for now as discussed with Neuro surgery     Lung mass:  CT of the chest showed: There is a large RIGHT lower lobe lobulated heterogeneous hypervascular mass extending from the hilum to the posterior sulcus of RIGHT lower lobe measuring 9.3 x 4.0 x 7.8 cm and concerning for adenocarcinoma of the RIGHT lower lobe. The mass extends extends into the RIGHT hilum encasing the RIGHT lower lobe pulmonary arteries and occluding the RIGHT lower lobe bronchus. Peripheral basilar satellite lesions noted adjacent to the main mass, will keep her NPO mid night for IR guided biopsy in am, discussed with Interventional radiologist, no pneumonia on CT scan.   will add on LDH level.        DVT prophylaxis: SCDs for now as there is hemorrhagic component of the brain mass and patient is going for the biopsy in am.

## 2019-06-25 NOTE — PROGRESS NOTE ADULT - ASSESSMENT
68ym s/p 68yf right handed female arrives from vacation and feels uncoordinated, disoriented, and overall weak.    Pt is an ex smoker,  She has had a 30 lb weight loss in the pass 2months which she has attributed to a total diet change.  MRI of the brain demonstrates a right post frontal and region cystic mass with other tiny lesion in the right frontal and left occipital region.  Ct of the chest demonstrates a large right lower mass     Plan  1 Pt will undergo an intervention radiology procedure of a needle guided biopsy in the am  2. Pt and  has met with Oncology Consult - Dr Agustin  3. Pt will be started on Decadron 10 mg x1 then 4mg q 6  4. Pt will be followed neurosurgically yet, if path is obtained via the IR procedure will most likely hold off on any Neurosurgical BX  5. Dr Talbot has reviewed all findings and all recommendations are as above.

## 2019-06-25 NOTE — PHYSICAL THERAPY INITIAL EVALUATION ADULT - PLANNED THERAPY INTERVENTIONS, PT EVAL
transfer training/gait training/motor coordination training/strengthening/balance training/bed mobility training

## 2019-06-26 ENCOUNTER — RESULT REVIEW (OUTPATIENT)
Age: 69
End: 2019-06-26

## 2019-06-26 LAB
GLUCOSE BLDC GLUCOMTR-MCNC: 122 MG/DL — HIGH (ref 70–99)
HCV AB S/CO SERPL IA: 0.19 S/CO — SIGNIFICANT CHANGE UP (ref 0–0.99)
HCV AB SERPL-IMP: SIGNIFICANT CHANGE UP

## 2019-06-26 PROCEDURE — 99232 SBSQ HOSP IP/OBS MODERATE 35: CPT

## 2019-06-26 PROCEDURE — 88342 IMHCHEM/IMCYTCHM 1ST ANTB: CPT | Mod: 26

## 2019-06-26 PROCEDURE — 88305 TISSUE EXAM BY PATHOLOGIST: CPT | Mod: 26

## 2019-06-26 PROCEDURE — 32405: CPT | Mod: RT

## 2019-06-26 PROCEDURE — 88341 IMHCHEM/IMCYTCHM EA ADD ANTB: CPT | Mod: 26

## 2019-06-26 PROCEDURE — 88333 PATH CONSLTJ SURG CYTO XM 1: CPT | Mod: 26

## 2019-06-26 PROCEDURE — 77012 CT SCAN FOR NEEDLE BIOPSY: CPT | Mod: 26

## 2019-06-26 PROCEDURE — 99223 1ST HOSP IP/OBS HIGH 75: CPT

## 2019-06-26 RX ADMIN — LEVETIRACETAM 500 MILLIGRAM(S): 250 TABLET, FILM COATED ORAL at 07:18

## 2019-06-26 RX ADMIN — PANTOPRAZOLE SODIUM 40 MILLIGRAM(S): 20 TABLET, DELAYED RELEASE ORAL at 07:20

## 2019-06-26 RX ADMIN — Medication 4 MILLIGRAM(S): at 00:01

## 2019-06-26 RX ADMIN — SODIUM CHLORIDE 60 MILLILITER(S): 9 INJECTION, SOLUTION INTRAVENOUS at 00:01

## 2019-06-26 RX ADMIN — Medication 4 MILLIGRAM(S): at 21:50

## 2019-06-26 RX ADMIN — Medication 4 MILLIGRAM(S): at 17:04

## 2019-06-26 RX ADMIN — Medication 4 MILLIGRAM(S): at 07:19

## 2019-06-26 RX ADMIN — LEVETIRACETAM 500 MILLIGRAM(S): 250 TABLET, FILM COATED ORAL at 17:04

## 2019-06-26 RX ADMIN — Medication 4 MILLIGRAM(S): at 11:41

## 2019-06-26 NOTE — PROGRESS NOTE ADULT - ASSESSMENT
69 y/o F with pmh Carpal tunnel syndrome, former smoker quit 7-8 years ago, and wrist fracture repair in R hand; as per patient she was in her usual stat of health she was feeling like she is walking wobbly and feel like about to fall down and that feeling was worsening, she recently went to [place] on cruise from 6/9/2019-6/, on the second day of the cruise she felt some motion sickness but attributed this to the cruise, States she has been on other cruises without symptoms, few years ago she went to [employer] but not recently, she has no fever, chills, headache, problem speaking, blurry or double vision, she had no nausea, vomiting, she did not fall down, she has no weakness or numbness of any specific area of the body, ct/ MRI of the brain demonstrates a cystic necrotic hemorrhagic mass with nodularity in the right posterior frontal parietal region involvement splenium or the corpus callosum crossing midline.  Positive extensive vasogenic edema. @ additional small enhancing lesion noted in the right frontal and left occipital lobe, chest xray pos for right lower lobe opacification ? pneumonia/mass    Plan:     Cystic brain mass: Admited in step down, no any neurological deficit or any new changes in the neuro exam, will change neuro checks to Q6 Neuo checks, Kepra 500mg BID, Neruo surgical team is following, ID eval appreciated, HIV test is negative, Onocology consult appreciated, CT of the chest/abdomen/pelvis with IV contrast done results reviewed, seizure precaution, fall precaution, PT consulted, will transfer patient to any bed, patient has been started on IV steriods by Neuro surgical team.     Lung mass:  CT of the chest showed: There is a large RIGHT lower lobe lobulated heterogeneous hypervascular mass extending from the hilum to the posterior sulcus of RIGHT lower lobe measuring 9.3 x 4.0 x 7.8 cm and concerning for adenocarcinoma of the RIGHT lower lobe. The mass extends extends into the RIGHT hilum encasing the RIGHT lower lobe pulmonary arteries and occluding the RIGHT lower lobe bronchus, Peripheral basilar satellite lesions noted adjacent to the main mass, she is NPO for IR guided biopsy scheduled today, no pneumonia on CT scan, LDH level is little above normal range.       DVT prophylaxis: SCDs for now as there is hemorrhagic component of the brain mass and patient is going for the biopsy in am.

## 2019-06-26 NOTE — CONSULT NOTE ADULT - SUBJECTIVE AND OBJECTIVE BOX
68yF was admitted on 06-24 with gait instability and feeling that he left side is not responding to her brain. OF note, patient had weight loss, but attributed to hcange in diet.     Imaging showed (ind reviewed):  HEAD CT - A 4.6 cm cystic/necrotic mass with nodular components in the right posterior frontal and parietal region with involvement of the right splenium of the corpus callosum. Extensive surrounding vasogenic edema. Right to left midline shift measuring 2 mm.    MRI BRAIN - A cystic/necrotic, hemorrhagic mass with enhancing nodularity in the right posterior frontal and parietal region, with involvement of the splenium of the corpus callosum and crossing the midline. Extensive T2/FLAIR hyperintensity surrounding this lesion suggesting vasogenic edema and/or nonenhancing tumor. Two smaller enhancing lesions in the right frontal lobe and left occipital lobe. Findings suggest primary malignancy such as GBM versus metastatic disease.    Patient is comfortable. Reports no headaches. Has corrdination and weakness on the left side. She had CT chest -- T of the chest showed: There is a large RIGHT lower lobe lobulated heterogeneous hypervascular mass extending from the hilum to the posterior sulcus of RIGHT lower lobe measuring 9.3 x 4.0 x 7.8 cm and concerning for adenocarcinoma of the RIGHT lower lobe. The mass extends extends into the RIGHT hilum encasing the RIGHT lower lobe pulmonary arteries and occluding the RIGHT lower lobe bronchus,     She is s/p CT guided biopsy of the right lung. Plan at this time, decadron. As per neurosurgery, will continue to follow.     REVIEW OF SYSTEMS  Constitutional - No fever, ++weight loss, No fatigue  HEENT - No eye pain, No visual disturbances, No difficulty hearing, No tinnitus, No vertigo, No neck pain  Respiratory - No cough, No wheezing, No shortness of breath  Cardiovascular - No chest pain, No palpitations  Gastrointestinal - No abdominal pain, No nausea, No vomiting, No diarrhea, No constipation  Genitourinary - No dysuria, No frequency, No hematuria, No incontinence  Neurological - No headaches, No memory loss, +loss of strength, No numbness, No tremors  Skin - No itching, No rashes, No lesions   Endocrine - No temperature intolerance  Musculoskeletal - No joint pain, No joint swelling, No muscle pain  Psychiatric - No depression, No anxiety    VITALS  T(C): 36.4 (06-26-19 @ 08:20), Max: 37.1 (06-25-19 @ 18:46)  HR: 70 (06-26-19 @ 11:15) (51 - 82)  BP: 140/70 (06-26-19 @ 11:15) (129/78 - 155/86)  RR: 18 (06-26-19 @ 11:15) (12 - 24)  SpO2: 93% (06-26-19 @ 11:15) (93% - 98%)  Wt(kg): --    PAST MEDICAL & SURGICAL HISTORY  No pertinent past medical history  Carpal tunnel syndrome      SOCIAL HISTORY  Smoking - Quit  EtOH - Denied   Drugs - Denied    FUNCTIONAL HISTORY  Lives with , 2 JOSE  Independent, Working    CURRENT FUNCTIONAL STATUS  6/26  Bed Mobility: Sit to Supine:     · Level of Parish	supervision	  · Physical Assist/Nonphysical Assist	supervision	  · Assistive Device	bed rails	    Bed Mobility: Supine to Sit:     · Level of Parish	supervision	  · Physical Assist/Nonphysical Assist	supervision	  · Assistive Device	bed rails	    Bed Mobility Analysis:     · Bed Mobility Limitations	decreased ability to use legs for bridging/pushing; decreased ability to use arms for pushing/pulling	  · Impairments Contributing to Impaired Bed Mobility	impaired balance; impaired coordination; impaired motor control; decreased strength	    Transfer: Sit to Stand:     · Level of Parish	minimum assist (75% patients effort); moderate assist (50% patients effort)	  · Physical Assist/Nonphysical Assist	1 person assist	  · Weight-Bearing Restrictions	weight-bearing as tolerated	  · Assistive Device	rolling walker	    Transfer: Stand to Sit:     · Level of Parish	minimum assist (75% patients effort); moderate assist (50% patients effort)	  · Physical Assist/Nonphysical Assist	1 person assist	  · Weight-Bearing Restrictions	weight-bearing as tolerated	  · Assistive Device	rolling walker	    Sit/Stand Transfer Safety Analysis:     · Transfer Safety Concerns Noted	decreased step length; losing balance	  · Impairments Contributing to Impaired Transfers	impaired balance; impaired coordination; impaired motor control; decreased strength	    Gait Skills:     · Level of Parish	minimum assist (75% patients effort); moderate assist (50% patients effort)	  · Physical Assist/Nonphysical Assist	1 person assist	  · Weight-Bearing Restrictions	weight-bearing as tolerated	  · Assistive Device	rolling walker	  · Gait Distance	25 feet	    Gait Analysis:     · Gait Pattern Used	3-point gait	  · Gait Deviations Noted	footdrop; decreased weight-shifting ability; increased stride width; decreased step length; decreased stride length	  · Impairments Contributing to Gait Deviations	impaired balance; impaired motor control; impaired coordination; decreased strength	    Stair Negotiation:     · Level of Parish	unable to perform; Not safe at this time	      FAMILY HISTORY   No pertinent family history in first degree relatives      RECENT LABS/IMAGING  CBC Full  -  ( 25 Jun 2019 05:51 )  WBC Count : 9.6 K/uL  RBC Count : 4.12 M/uL  Hemoglobin : 12.7 g/dL  Hematocrit : 38.7 %  Platelet Count - Automated : 275 K/uL  Mean Cell Volume : 93.9 fl  Mean Cell Hemoglobin : 30.8 pg  Mean Cell Hemoglobin Concentration : 32.8 g/dL  Auto Neutrophil # : 5.7 K/uL  Auto Lymphocyte # : 2.7 K/uL  Auto Monocyte # : 1.0 K/uL  Auto Eosinophil # : 0.2 K/uL  Auto Basophil # : 0.0 K/uL  Auto Neutrophil % : 59.6 %  Auto Lymphocyte % : 27.9 %  Auto Monocyte % : 10.3 %  Auto Eosinophil % : 1.8 %  Auto Basophil % : 0.3 %    06-25    143  |  108<H>  |  11.0  ----------------------------<  89  4.0   |  23.0  |  0.84    Ca    8.5<L>      25 Jun 2019 05:51    TPro  6.6  /  Alb  3.2<L>  /  TBili  0.4  /  DBili  x   /  AST  17  /  ALT  14  /  AlkPhos  63  06-25        ALLERGIES  No Known Allergies      MEDICATIONS   acetaminophen   Tablet .. 650 milliGRAM(s) Oral every 6 hours PRN  dexamethasone  Injectable 4 milliGRAM(s) IV Push every 6 hours  levETIRAcetam 500 milliGRAM(s) Oral two times a day  pantoprazole    Tablet 40 milliGRAM(s) Oral before breakfast      ----------------------------------------------------------------------------------------  PHYSICAL EXAM  Constitutional - NAD, Comfortable  HEENT - NCAT, EOMI  Neck - Supple, No limited ROM  Chest - Breathing comfortably, No wheezing  Cardiovascular - S1S2   Abdomen - Soft   Extremities - No C/C/E, No calf tenderness   Neurologic Exam -                    Cognitive - Awake, Alert, AAO to self, place, date, year, situation     Communication - Fluent, No dysarthria     Cranial Nerves - CN 2-12 intact     Motor - Left UE weakness - impaired by coordination                    LEFT    UE - ShAB 5/5, EF 5/5, EE 5/5, WE 4/5,  5/5                    RIGHT UE - ShAB 5/5, EF 5/5, EE 5/5, WE 5/5,  5/5                    LEFT    LE - HF 5/5, KE 5/5, DF 5/5, PF 5/5                    RIGHT LE - HF 5/5, KE 5/5, DF 5/5, PF 5/5        Sensory - Intact to LT     Coordination - Impaired on the left **impaired proprioception of the toes     Balance - WNL Static  Psychiatric - Mood stable, Affect WNL  ----------------------------------------------------------------------------------------  ASSESSMENT/PLAN  68yFemale with functional deficits after found to have a cystic brain mass with a concurrent lung mass  Lung mass - s/p biopsy, Hem/Onc following  Brain Mass - Observation, Decadron, Keppra for seizure PPX  Pain - Tylenol  DVT PPX - SCDs  Rehab - Patient would like to go home but understands if needs rehab. Will continue to follow for ongoing rehab needs and recommendations. Patient is pending OT eval. May qualify for AR. Workup is still incomplete.     Will continue to follow. Functional progress will determine ongoing rehab dispo recommendations, which may change.    Continue bedside therapy as well as OOB throughout the day with mobilization by staff to maintain cardiopulmonary function and prevention of secondary complications related to debility.

## 2019-06-26 NOTE — PROGRESS NOTE ADULT - ASSESSMENT
67 y/o F with PMHx of Carpal tunnel syndrome, former smoker quit 7-8 years ago, and wrist fracture repair in R hand presents with complaints of walking wobbly and feeling  like she was about to fall down. She recently went to [place] on cruise from 6/9/2019-6/, on the second day of the cruise she felt some motion sickness but attributed this to the cruise.  States she has been on other cruises without symptoms. She has no fever, chills, headache, problem speaking, blurry or double vision,  no nausea, vomiting, she did not fall down, she has no weakness or numbness of any specific area of the body. (24 Jun 2019 19:32)    PAST MEDICAL & SURGICAL HISTORY:  No pertinent past medical history  Carpal tunnel syndrome    FAMILY HISTORY:  No pertinent family history in first degree relatives    REVIEW OF SYSTEMS  Left sided weakness  Wobbliness  All other ROS is neg  Lost weight on a diet and exercise.    68 year old female with large right cystic brain mass, + edema, and lung mass    PROCEDURE: 6/26 S/P CT guided biopsy of right lung       PLAN:  NEURO:   *cont neuro checks  *analgesics prn  *continue decadron  *continue keppra for seizure prophylaxis  * will follow up lung biopsy path  *heme/onc follow up appreciated        Assessment:  Please Check When Present   []  GCS  E   V  M     Heart Failure: []Acute, [] acute on chronic , []chronic  Heart Failure:  [] Diastolic (HFpEF), [] Systolic (HFrEF), []Combined (HFpEF and HFrEF), [] RHF, [] Pulm HTN, [] Other    [] EUFEMIA, [] ATN, [] AIN, [] other  [] CKD1, [] CKD2, [] CKD 3, [] CKD 4, [] CKD 5, []ESRD    Encephalopathy: [] Metabolic, [] Hepatic, [] toxic, [] Neurological, [] Other    Abnormal Nurtitional Status: [] malnurtition (see nutrition note), [ ]underweight: BMI < 19, [] morbid obesity: BMI >40, [] Cachexia    [] Sepsis  [] hypovolemic shock,[] cardiogenic shock, [] hemorrhagic shock, [] neuogenic shock  [] Acute Respiratory Failure  []Cerebral edema, [] Brain compression/ herniation,   [] Functional quadriplegia  [] Acute blood loss anemia    Will discuss plan with Dr. Talbot.

## 2019-06-27 DIAGNOSIS — G93.9 DISORDER OF BRAIN, UNSPECIFIED: ICD-10-CM

## 2019-06-27 DIAGNOSIS — R91.8 OTHER NONSPECIFIC ABNORMAL FINDING OF LUNG FIELD: ICD-10-CM

## 2019-06-27 PROCEDURE — 99222 1ST HOSP IP/OBS MODERATE 55: CPT

## 2019-06-27 PROCEDURE — 99232 SBSQ HOSP IP/OBS MODERATE 35: CPT

## 2019-06-27 RX ADMIN — Medication 4 MILLIGRAM(S): at 05:38

## 2019-06-27 RX ADMIN — LEVETIRACETAM 500 MILLIGRAM(S): 250 TABLET, FILM COATED ORAL at 05:38

## 2019-06-27 RX ADMIN — LEVETIRACETAM 500 MILLIGRAM(S): 250 TABLET, FILM COATED ORAL at 18:00

## 2019-06-27 RX ADMIN — Medication 4 MILLIGRAM(S): at 11:59

## 2019-06-27 RX ADMIN — Medication 4 MILLIGRAM(S): at 22:17

## 2019-06-27 RX ADMIN — PANTOPRAZOLE SODIUM 40 MILLIGRAM(S): 20 TABLET, DELAYED RELEASE ORAL at 05:39

## 2019-06-27 RX ADMIN — Medication 4 MILLIGRAM(S): at 18:00

## 2019-06-27 NOTE — CONSULT NOTE ADULT - SUBJECTIVE AND OBJECTIVE BOX
Patient is a 68y old  Female who presents with a chief complaint of Brain mass (27 Jun 2019 14:01)    HPI:  67 y/o F with pmh Carpal tunnel syndrome, former smoker quit 7-8 years ago, and wrist fracture repair in R hand; as per patient she was in her usual stat of health she was feeling like she is walking wobbly and feel like about to fall down and that feeling was worsening, she recently went to [place] on cruise from 6/9/2019-6/, on the second day of the cruise she felt some motion sickness but attributed this to the cruise, States she has been on other cruises without symptoms, few years ago she went to [facility] but not recently, she has no fever, chills, headache, problem speaking, blurry or double vision, she had no nausea, vomiting, she did not fall down, she has no weakness or numbness of any specific area of the body. (24 Jun 2019 19:32)    Presented to [facility] ED for evaluation on 6/24/19.  Diagnostic evaluation included:    < from: CT Head No Cont (06.24.19 @ 11:21) >  A 4.6 cm cystic/necrotic mass with nodular components in the right   posterior frontal and parietal region with involvement of the right   splenium of the corpus callosum. Extensive surrounding vasogenic edema.   Right to left midline shift measuring 2 mm.    < from: MR Head w/ IV Cont (06.24.19 @ 14:09) >  Redemonstration of a cystic/necrotic mass with enhancing nodularity in   the right posterior frontal and parietal region measuring 4.8 x 3.8 x 3.2   cm. The enhancing nodular component along the medial aspect of the mass   involves the splenium of the corpus callosum and crosses the midline.   There is extensive T2/FLAIR hyperintensity surrounding this mass   suggestingvasogenic edema and/or nonenhancing tumor. There are foci of   susceptibility artifact in the lesion compatible with blood products. The   cystic component of the mass contains layering hemorrhage. The solid   enhancing component of this lesion demonstrates restricted diffusion   suggesting hypercellularity.  Two smaller enhancing lesions are noted,   one in the high right frontal lobe measuring 0.8 x 0.7 x 0.9 cm (10:26)   and the other in the left occipital lobe measuring 0.5 x 0.4 x 0.4 cm   (10:13).   IMPRESSION:   A cystic/necrotic, hemorrhagic mass with enhancing nodularity in the   right posterior frontal and parietal region, with involvement of the   splenium of the corpus callosum and crossing the midline. Extensive   T2/FLAIR hyperintensity surrounding this lesion suggesting vasogenic   edema and/or nonenhancing tumor. Two smaller enhancing lesions in the   right frontal lobe and left occipital lobe. Findings suggest primary   malignancy such as GBM versus metastatic disease.    < from: CT Chest, Abdomen and Pelvis w/ IV Cont (06.24.19 @ 18:53) >  There is a large RIGHT lower lobe lobulated heterogeneous hypervascular   mass extending from the hilum to the posterior sulcus of RIGHT lower lobe   measuring 9.3 x 4.0 x 7.8 cm and concerning for adenocarcinoma of the   RIGHT lower lobe. The mass extends extends into the RIGHT hilum encasing   the RIGHT lower lobe pulmonary arteries and occluding the RIGHT lower   lobe bronchus. Peripheral basilar satellite lesions noted adjacent to the   main mass.  IMPRESSION:   LARGE RIGHT LOWER LOBE LOBULATED PARTIALLY NECROTIC HYPOVASCULAR MASS   EXTENDING FROM THE SULCUS TO THE HILUM CONCERNING FOR ADENOCARCINOMA OF   THE LUNG. LEFT lung clear. Abdominal pelvic viscera aside from ptotic   gallbladder unremarkable.    She underwent CT guided biopsy of right lung 26-Jun-2019 10:45:28.  Pathology pending.    Currently, she reports interval improvement of her wobbliness since admission and starting steroids.  She continues to note some left-sided motor weakness, and some difficulty with left-handed grasp.    PAST MEDICAL & SURGICAL HISTORY:  No pertinent past medical history  Carpal tunnel syndrome    FAMILY HISTORY:  No pertinent family history in first degree relatives    ALLERGIES:  No Known Allergies    MEDICATIONS  (STANDING):  dexamethasone  Injectable 4 milliGRAM(s) IV Push every 6 hours  levETIRAcetam 500 milliGRAM(s) Oral two times a day  pantoprazole    Tablet 40 milliGRAM(s) Oral before breakfast    MEDICATIONS  (PRN):  acetaminophen   Tablet .. 650 milliGRAM(s) Oral every 6 hours PRN Temp greater or equal to 38C (100.4F), Mild Pain (1 - 3)    PHYSICAL EXAM:  Vital Signs Last 24 Hrs  T(C): 36.5 (27 Jun 2019 08:05), Max: 36.7 (26 Jun 2019 23:33)  T(F): 97.7 (27 Jun 2019 08:05), Max: 98.1 (26 Jun 2019 23:33)  HR: 51 (27 Jun 2019 08:05) (51 - 55)  BP: 127/70 (27 Jun 2019 08:05) (121/74 - 137/70)  BP(mean): --  RR: 18 (27 Jun 2019 08:05) (18 - 20)  SpO2: 97% (27 Jun 2019 08:05) (93% - 98%)      IMAGING/LABS/PATHOLOGY: I have personally reviewed the relevant labs, pathology, and imaging as noted in the HPI.  In addition,

## 2019-06-27 NOTE — CONSULT NOTE ADULT - NEUROLOGICAL DETAILS
responds to verbal commands/sensation intact/cranial nerves intact/no spontaneous movement/alert and oriented x 3

## 2019-06-27 NOTE — PROGRESS NOTE ADULT - ASSESSMENT
69 y/o F with pmh Carpal tunnel syndrome, former smoker quit 7-8 years ago, and wrist fracture repair in R hand; as per patient she was in her usual stat of health she was feeling like she is walking wobbly and feel like about to fall down and that feeling was worsening, she recently went to [place] on cruise from 6/9/2019-6/, on the second day of the cruise she felt some motion sickness but attributed this to the cruise, States she has been on other cruises without symptoms, few years ago she went to [employer] but not recently, she has no fever, chills, headache, problem speaking, blurry or double vision, she had no nausea, vomiting, she did not fall down, she has no weakness or numbness of any specific area of the body, ct/ MRI of the brain demonstrates a cystic necrotic hemorrhagic mass with nodularity in the right posterior frontal parietal region involvement splenium or the corpus callosum crossing midline.  Positive extensive vasogenic edema. @ additional small enhancing lesion noted in the right frontal and left occipital lobe, chest xray pos for right lower lobe opacification ? pneumonia/mass    Plan:     Cystic brain mass: Admited in step down, no any neurological deficit or any new changes in the neuro exam, will change neuro checks to Q6 Neuo checks, Kepra 500mg BID, [facility]er[facility] surgical team is following, ID eval appreciated, HIV test is negative, Onocology consult appreciated, CT of the chest/abdomen/pelvis with IV contrast done results reviewed, seizure precaution, fall precaution, PT/OT and acute rehab is working with patient, will continue with IV steriods as per Neuro surgical team, radiation oncology consult has been called.     Lung mass:  CT of the chest showed: There is a large RIGHT lower lobe lobulated heterogeneous hypervascular mass extending from the hilum to the posterior sulcus of RIGHT lower lobe measuring 9.3 x 4.0 x 7.8 cm and concerning for adenocarcinoma of the RIGHT lower lobe. The mass extends extends into the RIGHT hilum encasing the RIGHT lower lobe pulmonary arteries and occluding the RIGHT lower lobe bronchus, Peripheral basilar satellite lesions noted adjacent to the main mass, s/p lung biopsy, will follow the results, no pneumonia on CT scan, LDH level is little above normal range.       DVT prophylaxis: SCDs for now as there is hemorrhagic component of the brain mass and patient is going for the biopsy in am.

## 2019-06-27 NOTE — CONSULT NOTE ADULT - NEGATIVE NEUROLOGICAL SYMPTOMS
no facial palsy/no generalized seizures/no tremors/no transient paralysis/no loss of consciousness/no hemiparesis/no focal seizures/no syncope/no headache/no confusion

## 2019-06-27 NOTE — PROGRESS NOTE ADULT - ASSESSMENT
68y Female PMH carpal tunnel syndrome, former smoker quit 7 years ago, admitted with dizziness/motion sickness, found with cystic/necrotic hemorrhagic mass with enhancing nodularity in right posterior frontal and parietal region involving corpus callosum and crossing midline and 2 smaller enhancing lesions in right frontal and left occipital lobe.  - CT C/A/P w/ large right lower lobe lobulated partially necrotic mass   - S/p right lung biopsy w/ IR 6/26    PLAN:  - D/w Dr. Talbot  - Pathology pending  - Q4 neuro checks  - Continue decadron 4 Q6, protonix/ISS while on steroids  - Continue keppra 500 Q12  - Hem/onc following  - From a neurosurgical perspective if otherwise medically cleared by medicine/hematology/oncology, there is no absolute neurosurgical contraindication for discharge of patient to rehab vs home with close outpatient follow up and continuation of steroids (can transition to 4 mg Q6 PO) and keppra  - PT/OT/PM&R  - Supportive care/further medical management per primary team

## 2019-06-27 NOTE — OCCUPATIONAL THERAPY INITIAL EVALUATION ADULT - PERTINENT HX OF CURRENT PROBLEM, REHAB EVAL
CT reveals large right lower lobe lobulated partially necrotic hypovascular mass extending from the sulcus to the hilum concerning for adenocarcinoma of the lung; left lung clear; adnominal pelvic viscera aside from ptotic gallbladder unremarkable.

## 2019-06-27 NOTE — CONSULT NOTE ADULT - PROBLEM SELECTOR RECOMMENDATION 9
-s/p CT guided biopsy on 6/26; pathology pending.  -Seen by Dr. Ham; systemic therapy recommendations pending pathology.

## 2019-06-27 NOTE — CONSULT NOTE ADULT - CONSULT REASON
brain mass
brain mets
admission initiation
left sided weakness, disoriented.
Cystic brain mass  RLL lung mass  Most likely lung cancer with brain mets. clinically

## 2019-06-27 NOTE — OCCUPATIONAL THERAPY INITIAL EVALUATION ADULT - GENERAL OBSERVATIONS, REHAB EVAL
Received pt seated in bedside chair, +IV lock, +chair alarm,  and daughter-in-law present; pt agrees to OT.

## 2019-06-27 NOTE — OCCUPATIONAL THERAPY INITIAL EVALUATION ADULT - DIAGNOSIS, OT EVAL
Pt presents to ED with c/o dizziness. Head MRI reveals a cystic/necrotic, hemorrhagic mass with enhancing nodularity in right posterior frontal and parietal region, with involvement of splenium of the corpus callosum and crossing the midline; extensive T2/FLAIR hyperintensity surrounding this lesion suggesting vasogenic edema and/or nonenhancing tumor; two smaller enhancing lesions in the right frontal lobe and left occipital lobe; findings suggest primary malignancy such as GBM vs metastatic disease.

## 2019-06-27 NOTE — CONSULT NOTE ADULT - ASSESSMENT
likely lung cancer with brain mets  Suggest lung bx and dexamethasone for the brain mass     RT consult.    Thank you.
67y/o  Female with no significant medical history. Patient presents with dizziness initially starting about 2 weeks back when she travelled to [place]. Patient was noted to be off balance with poor coordination. No fever or chills, headaches. Patient thought she was having symptoms of motion sickness. Patient also reports weight loss 30lbs which she attributes to watching her diet. In the ER patient was afebrile, leukocytosis to 11.9k      Brain mass  Leukocytosis      - Blood cultures pending  - MRI brain with cystic/necrotic, hemorrhagic mass  - Will check HIV  - Procalcitonin level ordered   - Monitor off antibiotics   - Trend Fever  - Trend Leukocytosis  - Neurosurgery following      Will Follow
68yf Right handed presented ED with Left sided uncoordination disoriented at time and overall feeling weak.  Pt just got back from Cruise and initially attributed the symptoms to being sea sick.   ct/ MRI of the brain demonstrates a cystic necrotic hemorrhagic mass with nodularity in the right posterior frontal parietal region involvement splenium or the corpus callosum crossing midline.  Positive extensive vasogenic edema. @ additional small enhancing lesion noted in the right frontal and left occipital lobe.     chest xray pos for right lower lobe opacification ? pneumonia    Plan  1. Admit to with neuro check q2  2, Pt has been administered anbx for pneumonia  3. Pt will need met workup ct of the chest/ abd and pelvis with and without contrast.   4. Pt keppra started continue  5. will Consider decadron  6. Films and exam reviewed with Dr. Talbot
ASSESSMENT AND PLAN: 67 yo F with pmh Carpal tunnel syndrome, s/p Wrist repair fracture and former smoker quit 7 years ago (was a 1ppd smoker for 25 years) who p/w imblance and motion like sickness a/w Brain mass in R front and parietal region    1. Brain mass: acute, ongoing workup  -MR head f/u results  -apprec neuro/neurosx recs  -admit to tele  -neuro checks  -consider  postoworkup PT eval for gait imbalance and retraining  -fall, aspiration and seizure precautions    2. Gait imbalance-acute sec to #1  -plan as above    Care plan discussed with Dr Meraz accepting hospitalist onsite
68 year old former smoker presenting with left-sided weakness and uncoordination, noted on imaging to have large 9.8 cm RLL lung mass and 3 intracranial lesions including 4.8 cm right posterior frontal / parietal lesion, consistent with stage IV lung cancer.  She is status post CT-guided biopsy of the RLL lung, pathology pending.

## 2019-06-27 NOTE — OCCUPATIONAL THERAPY INITIAL EVALUATION ADULT - MANUAL MUSCLE TESTING RESULTS, REHAB EVAL
right shoulder 4/5, right elbow 4/5, right gross grasp 5/5; left shoulder 3/5, left elbow 3/5, left gross grasp 3+/5

## 2019-06-27 NOTE — OCCUPATIONAL THERAPY INITIAL EVALUATION ADULT - ASSISTIVE DEVICE FOR TOILET TRANSFER, REHAB EVAL
elevated toilet seat with right grab bar; recommend use of commode over standard toilet at home to increase surface height and provide armrests/rolling walker

## 2019-06-27 NOTE — OCCUPATIONAL THERAPY INITIAL EVALUATION ADULT - PLANNED THERAPY INTERVENTIONS, OT EVAL
neuromuscular re-education/fine motor coordination training/ADL retraining/balance training/bed mobility training/toilet/strengthening/transfer training

## 2019-06-27 NOTE — CONSULT NOTE ADULT - SKIN
[facility] ambulatory encounter  FAMILY PRACTICE OFFICE VISIT    CHIEF COMPLAINT:    Chief Complaint   Patient presents with   • Eye Problem     Possible pink eye to both eyes but the left is worse. Has been using OTC drops for the last week. Itchy and feels like there are foreign objects in the eyes with redness.    • Other     Patient is here today with .    • Blood Pressure     Will recheck, inconsisten #'s.       SUBJECTIVE:  Yaquelin Prescott is a 72 year old female who presented requesting evaluation for irritation and redness in eyes bilaterally. Her  is with her for this visit.    She has been having intermittent symptoms for about 1 month. He describes chronic dry eyes along with irritation, and redness. She has tried liquid tears over the counter without improvement. Denies any foreign objects in eye. Her vision has been at baseline, she wears glasses. She had denies any eye pain or other visual changes. She denies any seasonal allergies, or upper respiratory infection symptoms, no fevers. No sore throat or difficulty swallowing. No wheezing or shortness of breath.    Review of systems:   Constitutional: Negative for fever and chills.   Skin: Negative for rash.   HEENT: Positive for purulent eye drainage. Negative for ear pain or sore throat.  Respiratory: Negative for cough or shortness of breath.    Cardiovascular: Negative for chest pain or chest pressure.   Gastrointestinal: Negative for nausea, vomiting, diarrhea or abdominal pain.      OBJECTIVE:  PROBLEM LIST:   Patient Active Problem List   Diagnosis   • Essential hypertension, benign   • Depressive disorder, not elsewhere classified   • Generalized anxiety disorder   • Calculus of kidney   • Obesity, unspecified   • Vitamin D deficiency   • Family history of coronary artery disease   • Chronic kidney disease (CKD), stage II (mild)   • Type 2 diabetes mellitus with diabetic chronic kidney disease (CMS/HCC)   • Mixed hyperlipidemia   •  Hydronephrosis   • Obstructive uropathy   • Screening for colon cancer   • Proteinuria       PAST HISTORIES:   I have reviewed the past medical history, family history, social history, medications and allergies listed in the medical record as obtained by my nursing staff and support staff and agree with their documentation.    PHYSICAL EXAM:   Vital Signs:    Visit Vitals  BP (!) 144/92 (BP Location: [facility], Patient Position: Sitting, Cuff Size: Regular)   Pulse 57   Temp 98.3 °F (36.8 °C) (Oral)   Ht 5' 2\" (1.575 m)   Wt 102.7 kg   SpO2 95%   BMI 41.41 kg/m²   BP on left upper extremity 122/84, Pulse 57    Pulse Ox Interpretation:  Within normal limits.  General:   Alert, cooperative, conversive in no acute distress.  Skin:  Warm and dry without rash.    Head:  Normocephalic, atraumatic.   Neck:  Trachea is midline.     Eyes:  Conjunctiva is injected bilaterally more so on the left than the right. Gaze is symmetrical. Slight periorbital edema on the left. Sclera slightly injected bilaterally. Pupils equal round reactive to light and accommodate. Extraocular movements intact.  ENT:  Mucous membranes are moist.  Cardiovascular:  Symmetrical pulses. Regular rate and rhythm without adventitious sounds or murmur.  Respiratory:  Normal respiratory effort. No use of accessory muscles.  Gastrointestinal:  Non-distended.    Neurologic:  Oriented x4.  No focal deficits.      LAB RESULTS:   No lab tests performed today    ASSESSMENT:   1. Acute conjunctivitis of both eyes, unspecified acute conjunctivitis type        PLAN:   Orders Placed This Encounter   • gentamicin (GARAMYCIN) 0.3 % ophthalmic solution       Prescribed gentamicin eyedrops, 1 drop to both eyes every 4 hours for the 7 days. He shouldn't encouraged to return to clinic if symptoms fail to improve or worsen. Or if she is to experience any eye pain or visual changes.    Return if symptoms worsen or fail to improve.    Instructions provided as documented in the  after visit summary.    The patient and  indicated understanding of the diagnosis and agreed with the plan of care.        No lesions; no rash

## 2019-06-27 NOTE — OCCUPATIONAL THERAPY INITIAL EVALUATION ADULT - ADDITIONAL COMMENTS
Pt lives in house with 2 JOSE and 12 steps inside up to bedroom and bathroom. Bathroom has bathtub with curtains. Pt does not own any DME. Pt is right handed. Pt drives. Pt's  is retired and able to assist upon discharge;  drives.

## 2019-06-28 ENCOUNTER — TRANSCRIPTION ENCOUNTER (OUTPATIENT)
Age: 69
End: 2019-06-28

## 2019-06-28 VITALS
OXYGEN SATURATION: 96 % | RESPIRATION RATE: 18 BRPM | HEART RATE: 64 BPM | TEMPERATURE: 99 F | DIASTOLIC BLOOD PRESSURE: 78 MMHG | SYSTOLIC BLOOD PRESSURE: 156 MMHG

## 2019-06-28 PROCEDURE — 97530 THERAPEUTIC ACTIVITIES: CPT

## 2019-06-28 PROCEDURE — 99232 SBSQ HOSP IP/OBS MODERATE 35: CPT

## 2019-06-28 PROCEDURE — 88341 IMHCHEM/IMCYTCHM EA ADD ANTB: CPT

## 2019-06-28 PROCEDURE — 93005 ELECTROCARDIOGRAM TRACING: CPT

## 2019-06-28 PROCEDURE — 88342 IMHCHEM/IMCYTCHM 1ST ANTB: CPT

## 2019-06-28 PROCEDURE — 84484 ASSAY OF TROPONIN QUANT: CPT

## 2019-06-28 PROCEDURE — 77012 CT SCAN FOR NEEDLE BIOPSY: CPT

## 2019-06-28 PROCEDURE — 86803 HEPATITIS C AB TEST: CPT

## 2019-06-28 PROCEDURE — 88333 PATH CONSLTJ SURG CYTO XM 1: CPT

## 2019-06-28 PROCEDURE — 36415 COLL VENOUS BLD VENIPUNCTURE: CPT

## 2019-06-28 PROCEDURE — 82550 ASSAY OF CK (CPK): CPT

## 2019-06-28 PROCEDURE — 71045 X-RAY EXAM CHEST 1 VIEW: CPT

## 2019-06-28 PROCEDURE — 97163 PT EVAL HIGH COMPLEX 45 MIN: CPT

## 2019-06-28 PROCEDURE — 70552 MRI BRAIN STEM W/DYE: CPT

## 2019-06-28 PROCEDURE — 82962 GLUCOSE BLOOD TEST: CPT

## 2019-06-28 PROCEDURE — 85610 PROTHROMBIN TIME: CPT

## 2019-06-28 PROCEDURE — 99285 EMERGENCY DEPT VISIT HI MDM: CPT | Mod: 25

## 2019-06-28 PROCEDURE — 99239 HOSP IP/OBS DSCHRG MGMT >30: CPT

## 2019-06-28 PROCEDURE — 74177 CT ABD & PELVIS W/CONTRAST: CPT

## 2019-06-28 PROCEDURE — 70450 CT HEAD/BRAIN W/O DYE: CPT

## 2019-06-28 PROCEDURE — 84145 PROCALCITONIN (PCT): CPT

## 2019-06-28 PROCEDURE — 83615 LACTATE (LD) (LDH) ENZYME: CPT

## 2019-06-28 PROCEDURE — 85027 COMPLETE CBC AUTOMATED: CPT

## 2019-06-28 PROCEDURE — 80053 COMPREHEN METABOLIC PANEL: CPT

## 2019-06-28 PROCEDURE — 86703 HIV-1/HIV-2 1 RESULT ANTBDY: CPT

## 2019-06-28 PROCEDURE — 97535 SELF CARE MNGMENT TRAINING: CPT

## 2019-06-28 PROCEDURE — 96375 TX/PRO/DX INJ NEW DRUG ADDON: CPT | Mod: XU

## 2019-06-28 PROCEDURE — 85730 THROMBOPLASTIN TIME PARTIAL: CPT

## 2019-06-28 PROCEDURE — 83605 ASSAY OF LACTIC ACID: CPT

## 2019-06-28 PROCEDURE — 97167 OT EVAL HIGH COMPLEX 60 MIN: CPT

## 2019-06-28 PROCEDURE — 96374 THER/PROPH/DIAG INJ IV PUSH: CPT | Mod: XU

## 2019-06-28 PROCEDURE — 87040 BLOOD CULTURE FOR BACTERIA: CPT

## 2019-06-28 PROCEDURE — 97116 GAIT TRAINING THERAPY: CPT

## 2019-06-28 PROCEDURE — 71260 CT THORAX DX C+: CPT

## 2019-06-28 PROCEDURE — 88305 TISSUE EXAM BY PATHOLOGIST: CPT

## 2019-06-28 RX ORDER — CALCIUM CARBONATE 500(1250)
0 TABLET ORAL
Qty: 0 | Refills: 0 | DISCHARGE

## 2019-06-28 RX ORDER — PANTOPRAZOLE SODIUM 20 MG/1
1 TABLET, DELAYED RELEASE ORAL
Qty: 30 | Refills: 0
Start: 2019-06-28 | End: 2019-07-27

## 2019-06-28 RX ORDER — LEVETIRACETAM 250 MG/1
1 TABLET, FILM COATED ORAL
Qty: 60 | Refills: 0
Start: 2019-06-28 | End: 2019-07-27

## 2019-06-28 RX ORDER — DEXAMETHASONE 0.5 MG/5ML
1 ELIXIR ORAL
Qty: 120 | Refills: 0
Start: 2019-06-28 | End: 2019-07-27

## 2019-06-28 RX ORDER — ACETAMINOPHEN 500 MG
2 TABLET ORAL
Qty: 0 | Refills: 0 | DISCHARGE
Start: 2019-06-28

## 2019-06-28 RX ADMIN — LEVETIRACETAM 500 MILLIGRAM(S): 250 TABLET, FILM COATED ORAL at 05:15

## 2019-06-28 RX ADMIN — PANTOPRAZOLE SODIUM 40 MILLIGRAM(S): 20 TABLET, DELAYED RELEASE ORAL at 05:15

## 2019-06-28 RX ADMIN — Medication 4 MILLIGRAM(S): at 12:55

## 2019-06-28 RX ADMIN — Medication 4 MILLIGRAM(S): at 05:15

## 2019-06-28 NOTE — DISCHARGE NOTE PROVIDER - NSDCCPCAREPLAN_GEN_ALL_CORE_FT
PRINCIPAL DISCHARGE DIAGNOSIS  Diagnosis: Brain mass  Assessment and Plan of Treatment:       SECONDARY DISCHARGE DIAGNOSES  Diagnosis: Lung mass  Assessment and Plan of Treatment: Lung mass

## 2019-06-28 NOTE — DISCHARGE NOTE PROVIDER - CARE PROVIDER_API CALL
Bert Talbot)  Neurosurgery  [facility] [address]  [place] [address]  Phone: [phone]  Fax: [phone]  Follow Up Time:     Jesus Gonzales)  Radiation Oncology  [address] [place] [address]  Phone: [phone]  Fax: [phone]  Follow Up Time:     Minh Ham  Office [address]  [address]  [place] [address]  Office: [phone]  Ans. Svc: [phone]  cell: [phone]  Phone: (   )    -  Fax: (   )    -  Follow Up Time:

## 2019-06-28 NOTE — DISCHARGE NOTE PROVIDER - HOSPITAL COURSE
69 y/o F with Hx of Carpal tunnel syndrome, former smoker quit 7-8 years ago, and wrist fracture repair in R hand; as per patient she was in her usual stat of health she was feeling like she is walking wobbly and feel like about to fall down and that feeling was worsening, she recently went to [place] on cruise from 6/9/2019-6/, on the second day of the cruise she felt some motion sickness but attributed this to the cruise, States she has been on other cruises without symptoms, [place], she has no fever, chills, headache, problem speaking, blurry or double vision, she had no nausea, vomiting, she did not fall down, she has no weakness or numbness of any specific area of the body, ct/ MRI of the brain demonstrates a cystic necrotic hemorrhagic mass with nodularity in the right posterior frontal parietal region involvement splenium or the corpus callosum crossing midline.  Positive extensive vasogenic edema. @ additional small enhancing lesion noted in the right frontal and left occipital lobe, chest xray pos for right lower lobe opacification ? pneumonia/mass    Patient was admitted under medicine for further workup for Cystic brain mass, Admited in step down, no any neurological deficit or any new changes in the neuro exam, Neuro check Q2 done then changed neuro checks to Q6 Neuo checks, she was given Kepra 500mg BID, Neruo surgical team followed the patient, and was continued with IV steriods as she was noted to have vasogenic edema, per Neuro sugical team follow up as out patient, continue Steriods for now, patient was also seen by radiation oncologist over the course as per them intracranial lesions noted, including 4.8 cm right posterior frontal / parietal lobe, 0.9 cm high right frontal lobe, and 0.5 cm left occipital lobe, given presentation with large lung lesion, favor brain metastases over primary CNS malignancy (eg multifocal GBM), agree with continue dexamethasone, as per them options may include resection of dominant lesion followed by post-op SRS, versus whole brain radiation if no surgery planned, would not recommend SRS alone given large size and location of lesion.  Acknowledge location of lesion may also preclude resection, but defer to Dr. Talbot from Neuro Surgery, ID eval appreciated, HIV test came negative, Onocology consult appreciated as per him follow up as out patient once the biopsy results are available.     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 The mass extends into the RIGHT hilum encasing the RIGHT lower lobe pulmonary arteries and occluding the RIGHT lower lobe bronchus, Peripheral basilar satellite lesions noted adjacent to the main mass, s/p lung biopsy, result is pending, , no pneumonia on CT scan, LDH level is little above normal range, her biopsy result will be followed by Oncologist, radiation oncologist, Neuro Surgeon.     she was given seizure precaution, fall precaution, PT/OT and acute rehab saw the patient and worked with patient, patient and her  wanted to her go home with home PT service.     patient is doing ok, she is being discharged home in a stable condition.         Vital Signs Last 24 Hrs    T(C): 36.7 (28 Jun 2019 07:51), Max: 36.7 (28 Jun 2019 07:51)    T(F): 98 (28 Jun 2019 07:51), Max: 98 (28 Jun 2019 07:51)    HR: 59 (28 Jun 2019 07:51) (59 - 86)    BP: 160/82 (28 Jun 2019 07:51) (135/62 - 160/82)    RR: 20 (28 Jun 2019 07:51) (18 - 20)    SpO2: 97% (27 Jun 2019 23:02) (95% - 97%)             PHYSICAL EXAM:        GENERAL: Elderly female looking comfortable     HEENT: PERRL, +EOMI    NECK: soft, Supple, No JVD,     CHEST/LUNG: Clear to auscultate bilaterally; No wheezing    HEART: S1S2+, Regular rate and rhythm; No murmurs    ABDOMEN: Soft, Nontender, Nondistended; Bowel sounds present    EXTREMITIES:  2+ Peripheral Pulses, No edema    SKIN: No rashes or lesions    NEURO: AAOX3, no focal deficits, no motor r sensory loss    PSYCH: normal mood        Total time spent 40 minutes

## 2019-06-28 NOTE — DISCHARGE NOTE PROVIDER - NSDCFUADDAPPT_GEN_ALL_CORE_FT
please call the office and get an appointments  please ask your doctor to follow your Lung biopsy reports

## 2019-06-28 NOTE — DISCHARGE NOTE PROVIDER - CARE PROVIDERS DIRECT ADDRESSES
,[email or web address],DirectAddress_Unknown,DirectAddress_Unknown

## 2019-06-28 NOTE — DISCHARGE NOTE NURSING/CASE MANAGEMENT/SOCIAL WORK - NSDCDPATPORTLINK_GEN_ALL_CORE
You can access the [facility] Portal, offered by [facility], by registering with the following website: [email or web address]

## 2019-06-28 NOTE — PROGRESS NOTE ADULT - SUBJECTIVE AND OBJECTIVE BOX
DHARA MARSH    [id number]    68y      Female    Patient is a 68y old  Female who presents with a chief complaint of Brain mass (27 Jun 2019 13:33)      INTERVAL HPI/OVERNIGHT EVENTS:    Patient is feeling ok, denies any new symptoms, no seizures, has some issues with gait, working with PT and OT, denies fever, chills, chest pain, headache, blurry vision.       REVIEW OF SYSTEMS:    CONSTITUTIONAL: No fever, some fatigue  RESPIRATORY: No cough, No shortness of breath  CARDIOVASCULAR: No chest pain, palpitations  GASTROINTESTINAL: No abdominal, No nausea, vomiting  NEUROLOGICAL: No headaches,  loss of strength.  MISCELLANEOUS: No joint swelling or pain        Vital Signs Last 24 Hrs  T(C): 36.5 (27 Jun 2019 08:05), Max: 36.7 (26 Jun 2019 23:33)  T(F): 97.7 (27 Jun 2019 08:05), Max: 98.1 (26 Jun 2019 23:33)  HR: 51 (27 Jun 2019 08:05) (51 - 55)  BP: 127/70 (27 Jun 2019 08:05) (121/74 - 137/70)  RR: 18 (27 Jun 2019 08:05) (18 - 20)  SpO2: 97% (27 Jun 2019 08:05) (93% - 98%)    PHYSICAL EXAM:    GENERAL: Elderly female looking comfortable   HEENT: PERRL, +EOMI  NECK: soft, Supple, No JVD,   CHEST/LUNG: Clear to auscultate bilaterally; No wheezing  HEART: S1S2+, Regular rate and rhythm; No murmurs  ABDOMEN: Soft, Nontender, Nondistended; Bowel sounds present  EXTREMITIES:  2+ Peripheral Pulses, No edema  SKIN: No rashes or lesions  NEURO: AAOX3, no focal deficits, no motor r sensory loss  PSYCH: normal mood      LABS:                  I&O's Summary      MEDICATIONS  (STANDING):  dexamethasone  Injectable 4 milliGRAM(s) IV Push every 6 hours  levETIRAcetam 500 milliGRAM(s) Oral two times a day  pantoprazole    Tablet 40 milliGRAM(s) Oral before breakfast    MEDICATIONS  (PRN):  acetaminophen   Tablet .. 650 milliGRAM(s) Oral every 6 hours PRN Temp greater or equal to 38C (100.4F), Mild Pain (1 - 3)
DHARA MARSH    [id number]    68y      Female    Patient is a 68y old  Female who presents with a chief complaint of Brain mass (25 Jun 2019 20:58)      INTERVAL HPI/OVERNIGHT EVENTS:      Patient is feeling ok, denies any new symptoms, new seizures, denies fever, chills, chest pain, headache, blurry vision.       REVIEW OF SYSTEMS:    CONSTITUTIONAL: No fever, some fatigue  RESPIRATORY: No cough, No shortness of breath  CARDIOVASCULAR: No chest pain, palpitations  GASTROINTESTINAL: No abdominal, No nausea, vomiting  NEUROLOGICAL: No headaches,  loss of strength.  MISCELLANEOUS: No joint swelling or pain        Vital Signs Last 24 Hrs  T(C): 36.4 (26 Jun 2019 08:20), Max: 37.1 (25 Jun 2019 18:46)  T(F): 97.6 (26 Jun 2019 08:20), Max: 98.8 (25 Jun 2019 18:46)  HR: 52 (26 Jun 2019 04:50) (51 - 81)  BP: 151/94 (26 Jun 2019 06:25) (129/78 - 155/86)  BP(mean): 100 (26 Jun 2019 00:00) (100 - 105)  RR: 12 (26 Jun 2019 04:50) (12 - 24)  SpO2: 97% (26 Jun 2019 04:50) (95% - 99%)    PHYSICAL EXAM:    GENERAL: Elderly female looking comfortable   HEENT: PERRL, +EOMI  NECK: soft, Supple, No JVD,   CHEST/LUNG: Clear to auscultate bilaterally; No wheezing  HEART: S1S2+, Regular rate and rhythm; No murmurs  ABDOMEN: Soft, Nontender, Nondistended; Bowel sounds present  EXTREMITIES:  2+ Peripheral Pulses, No edema  SKIN: No rashes or lesions  NEURO: AAOX3, no focal deficits, no motor r sensory loss  PSYCH: normal mood      LABS:                        12.7   9.6   )-----------( 275      ( 25 Jun 2019 05:51 )             38.7     06-25    143  |  108<H>  |  11.0  ----------------------------<  89  4.0   |  23.0  |  0.84    Ca    8.5<L>      25 Jun 2019 05:51    TPro  6.6  /  Alb  3.2<L>  /  TBili  0.4  /  DBili  x   /  AST  17  /  ALT  14  /  AlkPhos  63  06-25    PT/INR - ( 25 Jun 2019 05:51 )   PT: 12.6 sec;   INR: 1.09 ratio         PTT - ( 24 Jun 2019 09:59 )  PTT:28.4 sec        I&O's Summary    25 Jun 2019 07:01  -  26 Jun 2019 07:00  --------------------------------------------------------  IN: 780 mL / OUT: 0 mL / NET: 780 mL        MEDICATIONS  (STANDING):  dexamethasone  Injectable 4 milliGRAM(s) IV Push every 6 hours  lactated ringers. 1000 milliLiter(s) (60 mL/Hr) IV Continuous <Continuous>  levETIRAcetam 500 milliGRAM(s) Oral two times a day  pantoprazole    Tablet 40 milliGRAM(s) Oral before breakfast    MEDICATIONS  (PRN):  acetaminophen   Tablet .. 650 milliGRAM(s) Oral every 6 hours PRN Temp greater or equal to 38C (100.4F), Mild Pain (1 - 3)
DHARA MARSH  68y  Female  [id number]      Patient is a 68y old  Female who presents with a chief complaint of Brain mass (24 Jun 2019 19:32)    HPI:  67 y/o F with a PMH of Carpal tunnel syndrome, former smoker quit 7-8 years ago, and wrist fracture repair in R hand; as per patient she was in her usual state of health she was feeling like she is walking wobbly and felt like about to fall down and that feeling was worsening, she recently went to [place] on a cruise from 6/9/2019-6/, on the second day of the cruise she felt some motion sickness but attributed this to the cruise, States she has been on other cruises without symptoms, few years ago she went to a [facility]    Cruise but not recently, she has no fever, chills, headaches, problem speaking, blurry or double vision, she had no nausea, vomiting, she did not fall down, she has no weakness or numbness of any specific area of the body. (24 Jun 2019 19:32)    PAST MEDICAL & SURGICAL HISTORY:  No pertinent past medical history  Carpal tunnel syndrome    FAMILY HISTORY:  No pertinent family history in first degree relatives    REVIEW OF SYSTEMS  Left sided weakness  Wobbliness  All other ROS is neg  Lost weight on a diet and exercise.    General:  See HPI  Weakness.  Wobbliness	  	    PHYSICAL EXAM:      Constitutional: No sweats, fever or bleeding    Eyes: Neg    ENMT: Neg    Neck: No masses  Mo LAD    Breasts: NE    Back: Clear    Respiratory: Clear   Mo rales or wheezes    Cardiovascular: RR    Gastrointestinal: No abn.    Genitourinary: NE    Rectal: NE    Extremities: No CCE  or DVT    Vascular: Normal appearing    Neurological: left sided weakness but moving all extremities    Skin: Slear    Lymph Nodes: No enlarged      CBC Full  -  ( 25 Jun 2019 05:51 )  WBC Count : 9.6 K/uL  RBC Count : 4.12 M/uL  Hemoglobin : 12.7 g/dL  Hematocrit : 38.7 %  Platelet Count - Automated : 275 K/uL  Mean Cell Volume : 93.9 fl  Mean Cell Hemoglobin : 30.8 pg  Mean Cell Hemoglobin Concentration : 32.8 g/dL  Auto Neutrophil # : 5.7 K/uL  Auto Lymphocyte # : 2.7 K/uL  Auto Monocyte # : 1.0 K/uL  Auto Eosinophil # : 0.2 K/uL  Auto Basophil # : 0.0 K/uL  Auto Neutrophil % : 59.6 %  Auto Lymphocyte % : 27.9 %  Auto Monocyte % : 10.3 %  Auto Eosinophil % : 1.8 %  Auto Basophil % : 0.3 %           PTT - ( 24 Jun 2019 09:59 )  PTT:28.4 sec  24 Jun 2019 09:59    143    |  106    |  16.0   ----------------------------<  110    4.1     |  24.0   |  0.92     Ca    9.5        24 Jun 2019 09:59    TPro  8.0    /  Alb  4.1    /  TBili  0.4    /  DBili  x      /  AST  23     /  ALT  18     /  AlkPhos  76     24 Jun 2019 09:59        < from: CT Chest w/ IV Cont (06.24.19 @ 18:53) >     EXAM:  CT CHEST IC                          PROCEDURE DATE:  06/24/2019          INTERPRETATION:  CT of the chest, abdomen and pelvis.   COMPARISON: None  CLINICAL INFORMATION: Brain mass. Assess for metastasis or primary   carcinoma.  PROCEDURE:   100 ml of Omnipaque was injected intravenously. None was discarded  2.5 mm axial slice thickness images were obtained. Coronal and sagittal   reformats were also obtained.     FINDINGS:  There is a large RIGHT lower lobe lobulated heterogeneous hypervascular   mass extending from the hilum to the posterior sulcus of RIGHT lower lobe   measuring 9.3 x 4.0 x 7.8 cm and concerning for adenocarcinoma of the   RIGHT lower lobe. The mass extends extends into the RIGHT hilum encasing   the RIGHT lower lobe pulmonary arteries and occluding the RIGHT lower   lobe bronchus. Peripheral basilar satellite lesions noted adjacent to the   main mass.  RIGHT upper lobe, and LEFT lung parenchyma remain clear.  Cardiac chambers remain normal in size without pericardial effusion. No   bulky mediastinal adenopathy.    No associated pleural effusion seen. No evidence of pulmonary embolus.    There is no free intra-abdominal air or ascites.   The gallbladder is distended ptotic without signs of acute cholecystitis.   Common bile duct measures 5.7 mm. No intrahepatic biliary duct dilatation   seen.  The liver, spleen, pancreas, adrenal glands, are normal.    The stomach, duodenum, small and large bowel and appendix are within   normal limits.    Both kidneys show normal uptake of contrast media without masses or   hydronephrosis.      The urinary bladder shows normal morphology and contour.      Uterus and adnexa unremarkable.      There are no retroperitoneal masses or abnormal lymphadenopathy.  The retroperitoneal vessels show atherosclerotic calcified plaques   throughout  nondilated abdominal aorta and iliac arteries. Focal   infrarenal abdominal aortic aneurysm measures 2.6 cm. The bones and soft   tissues are intact.    IMPRESSION:     LARGE RIGHT LOWER LOBE LOBULATED PARTIALLY NECROTIC HYPOVASCULAR MASS   EXTENDING FROM THE SULCUS TO THE HILUM CONCERNING FOR ADENOCARCINOMA OF   THE LUNG. LEFT lung clear. Abdominal pelvic viscera aside from ptotic   gallbladder unremarkable.    < end of copied text >  < from: MR Head w/ IV Cont (06.24.19 @ 14:09) >     EXAM:  MR BRAIN IC                          PROCEDURE DATE:  06/24/2019          INTERPRETATION:  CLINICAL INFORMATION: mass vs infection. . ADMDIAG1:   G93.9 DISORDER OF BRAIN, UNSPECIFIED/.    TECHNIQUE: Multiplanar, multisequence brain MRI was performed without   intravenous contrast    COMPARISON: CT head 6/24/2019.    FINDINGS:    Some images are degraded by motion.    Redemonstration of a cystic/necrotic mass with enhancing nodularity in   the right posterior frontal and parietal region measuring 4.8 x 3.8 x 3.2   cm. The enhancing nodular component along the medial aspect of the mass   involves the splenium of the corpus callosum and crosses the midline.   There is extensive T2/FLAIR hyperintensity surrounding this mass   suggestingvasogenic edema and/or nonenhancing tumor. There are foci of   susceptibility artifact in the lesion compatible with blood products. The   cystic component of the mass contains layering hemorrhage. The solid   enhancing component of this lesion demonstrates restricted diffusion   suggesting hypercellularity.  Two smaller enhancing lesions are noted,   one in the high right frontal lobe measuring 0.8 x 0.7 x 0.9 cm (10:26)   and the other in the left occipital lobe measuring 0.5 x 0.4 x 0.4 cm   (10:13).     There is cerebral volume loss.  There is no evidence of acute infarct.   Scattered foci of T2/FLAIR hyperintensity in the bilateral   periventricular white matter are nonspecific but likely related to   chronic white matter microvascular ischemic disease.    Flow voids of the major intracranial vessels at the skull base follow   expected course and contour.    Mild bilateral maxillary sinus mucosal thickening. Mild bilateral mastoid   mucosal thickening. Bilateral orbits are within normal limits.    IMPRESSION:   A cystic/necrotic, hemorrhagic mass with enhancing nodularity in the   right posterior frontal and parietal region, with involvement of the   splenium of the corpus callosum and crossing the midline. Extensive   T2/FLAIR hyperintensity surrounding this lesion suggesting vasogenic   edema and/or nonenhancing tumor. Two smaller enhancing lesions in the   right frontal lobe and left occipital lobe. Findings suggest primary   malignancy such as GBM versus metastatic disease.    < end of copied text >            Assessment and Recommendation:   		  Lkely lung cancer with brain mets  Suggest lung bx and dexamethasone for the brain mass   Scheduled for a lung bx in AM  RT consult.    Thank you.
[facility]  INFECTIOUS DISEASES AND INTERNAL MEDICINE at [place]  =======================================================  Rc Elizabeth MD  Diplomates American Board of Internal Medicine and Infectious Diseases  Tel: [phone]      Fax: [phone]  =======================================================    DHARA MARSH [id number]    Follow up: Brain mass    No fever or chills  No abdominal pain or diarrhea  Good appetite       Allergies:  No Known Allergies      Antibiotics:        REVIEW OF SYSTEMS:  CONSTITUTIONAL:  No Fever or chills  HEENT:  No diplopia or blurred vision.  No earache, sore throat or runny nose.  CARDIOVASCULAR:  No pressure, squeezing, strangling, tightness, heaviness or aching about the chest, neck, axilla or epigastrium.  RESPIRATORY:  No cough, shortness of breath  GASTROINTESTINAL:  No nausea, vomiting or diarrhea.  GENITOURINARY:  No dysuria, frequency or urgency.   MUSCULOSKELETAL:  no joint aches, no muscle pain  SKIN:  No change in skin, hair or nails.  NEUROLOGIC:  No Headaches, seizures or weakness.  PSYCHIATRIC:  No disorder of thought or mood.  ENDOCRINE:  No heat or cold intolerance  HEMATOLOGICAL:  No easy bruising or bleeding.       Physical Exam:  Vital Signs Last 24 Hrs  T(C): 36.7 (25 Jun 2019 07:59), Max: 37 (24 Jun 2019 16:14)  T(F): 98.1 (25 Jun 2019 07:59), Max: 98.6 (24 Jun 2019 16:14)  HR: 68 (25 Jun 2019 07:59) (68 - 87)  BP: 156/90 (25 Jun 2019 07:59) (122/70 - 160/83)  RR: 18 (25 Jun 2019 07:59) (18 - 19)  SpO2: 96% (25 Jun 2019 07:59) (96% - 99%)      GEN: NAD, pleasant  HEENT: normocephalic and atraumatic. EOMI. PERRL.  Anicteric  NECK: Supple.   LUNGS: Clear to auscultation.  HEART: Regular rate and rhythm   ABDOMEN: Soft, nontender, and nondistended.  Positive bowel sounds.    : No CVA tenderness  EXTREMITIES: Without any edema.  MSK: No joint swelling  NEUROLOGIC:  No Focal Deficits  PSYCHIATRIC: Appropriate affect .  SKIN: No Rash      Labs:  06-25    143  |  108<H>  |  11.0  ----------------------------<  89  4.0   |  23.0  |  0.84    Ca    8.5<L>      25 Jun 2019 05:51    TPro  6.6  /  Alb  3.2<L>  /  TBili  0.4  /  DBili  x   /  AST  17  /  ALT  14  /  AlkPhos  63  06-25                          12.7   9.6   )-----------( 275      ( 25 Jun 2019 05:51 )             38.7       PT/INR - ( 25 Jun 2019 05:51 )   PT: 12.6 sec;   INR: 1.09 ratio         PTT - ( 24 Jun 2019 09:59 )  PTT:28.4 sec    LIVER FUNCTIONS - ( 25 Jun 2019 05:51 )  Alb: 3.2 g/dL / Pro: 6.6 g/dL / ALK PHOS: 63 U/L / ALT: 14 U/L / AST: 17 U/L / GGT: x           CARDIAC MARKERS ( 24 Jun 2019 09:59 )  x     / <0.01 ng/mL / 83 U/L / x     / x          Procalcitonin, Serum (06.25.19 @ 05:51)    Procalcitonin, Serum: 0.16: Reference range change as of 3/21/2019 ng/mL
INTERVAL HPI/OVERNIGHT EVENTS:  68y Female PMH carpal tunnel syndrome, former smoker quit 7 years ago, admitted with dizziness/motion sickness, found with cystic/necrotic hemorrhagic mass with enhancing nodularity in right posterior frontal and parietal region involving corpus callosum and crossing midline and 2 smaller enhancing lesions in right frontal and left occipital lobe. S/p CT guided biopsy of right lung 6/27/19 POD#1. Pathology pending. Patient seen earlier this AM sitting comfortably in bed. No complaints. Denies headache, dizziness, nausea, vomiting, chest pain, palpitations, SOB, abdominal pain.    Vital Signs Last 24 Hrs  T(C): 36.5 (27 Jun 2019 08:05), Max: 36.7 (26 Jun 2019 23:33)  T(F): 97.7 (27 Jun 2019 08:05), Max: 98.1 (26 Jun 2019 23:33)  HR: 51 (27 Jun 2019 08:05) (51 - 55)  BP: 127/70 (27 Jun 2019 08:05) (121/74 - 137/70)  BP(mean): --  RR: 18 (27 Jun 2019 08:05) (18 - 20)  SpO2: 97% (27 Jun 2019 08:05) (93% - 98%)    PHYSICAL EXAM:  GENERAL: NAD, well-groomed, well-developed  HEAD:  Atraumatic, normocephalic  JASMYN COMA SCORE: E- 4 V- 5 M- 6 =15  MENTAL STATUS: AAO x3; Appropriately conversant without aphasia; following commands  CRANIAL NERVES: PERRL. EOMI without nystagmus. Facial sensation intact V1-3 distribution b/l. Face symmetric w/ normal eye closure and smile, tongue midline. Hearing grossly intact. Speech clear  MOTOR: LUE 4/5, RUE/RLE/LLE 5/5  SENSATION: grossly intact to light touch all extremities  CHEST/LUNG: Nonlabored breathing, no respiratory distress    LABS:      RADIOLOGY & ADDITIONAL TESTS:  CT Chest w/ IV Cont (06.24.19 @ 18:53)  IMPRESSION:   LARGE RIGHT LOWER LOBE LOBULATED PARTIALLY NECROTIC HYPOVASCULAR MASS   EXTENDING FROM THE SULCUS TO THE HILUM CONCERNING FOR ADENOCARCINOMA OF   THE LUNG. LEFT lung clear. Abdominal pelvic viscera aside from ptotic   gallbladder unremarkable.    MR Head w/ IV Cont (06.24.19 @ 14:09)  IMPRESSION:   A cystic/necrotic, hemorrhagic mass with enhancing nodularity in the   right posterior frontal and parietal region, with involvement of the   splenium of the corpus callosum and crossing the midline. Extensive   T2/FLAIR hyperintensity surrounding this lesion suggesting vasogenic   edema and/or nonenhancing tumor. Two smaller enhancing lesions in the   right frontal lobe and left occipital lobe. Findings suggest primary   malignancy such as GBM versus metastatic disease.    CT Head No Cont (06.24.19 @ 11:21)  IMPRESSION:   A 4.6 cm cystic/necrotic mass with nodular components in the right   posterior frontal and parietal region with involvement of the right   splenium of the corpus callosum. Extensive surrounding vasogenic edema.   Right to left midline shift measuring 2 mm.
INTERVAL HPI/OVERNIGHT EVENTS:  68y Female PMH carpal tunnel syndrome, former smoker quit 7 years ago, admitted with dizziness/motion sickness, found with cystic/necrotic hemorrhagic mass with enhancing nodularity in right posterior frontal and parietal region involving corpus callosum and crossing midline and 2 smaller enhancing lesions in right frontal and left occipital lobe. S/p CT guided biopsy of right lung 6/27/19 POD#2.    Patient seen earlier this AM. No complaints. No acute events overnight.    Vital Signs Last 24 Hrs  T(C): 36.7 (28 Jun 2019 07:51), Max: 36.7 (28 Jun 2019 07:51)  T(F): 98 (28 Jun 2019 07:51), Max: 98 (28 Jun 2019 07:51)  HR: 59 (28 Jun 2019 07:51) (59 - 86)  BP: 160/82 (28 Jun 2019 07:51) (135/62 - 160/82)  BP(mean): --  RR: 20 (28 Jun 2019 07:51) (18 - 20)  SpO2: 97% (27 Jun 2019 23:02) (95% - 97%)    PHYSICAL EXAM:  GENERAL: NAD, well-groomed, well-developed  HEAD:  Atraumatic, normocephalic  JASMYN COMA SCORE: E- 4 V- 5 M- 6 =15  MENTAL STATUS: AAO x3; Appropriately conversant without aphasia; following commands  CRANIAL NERVES: PERRL. EOMI without nystagmus. Facial sensation intact V1-3 distribution b/l. Face symmetric w/ normal eye closure and smile, tongue midline. Hearing grossly intact. Speech clear  MOTOR: LUE 4/5, RUE/RLE/LLE 5/5  SENSATION: grossly intact to light touch all extremities  CHEST/LUNG: Nonlabored breathing, no respiratory distress    LABS:    RADIOLOGY & ADDITIONAL TESTS:  CT Chest w/ IV Cont (06.24.19 @ 18:53)  IMPRESSION:   LARGE RIGHT LOWER LOBE LOBULATED PARTIALLY NECROTIC HYPOVASCULAR MASS   EXTENDING FROM THE SULCUS TO THE HILUM CONCERNING FOR ADENOCARCINOMA OF   THE LUNG. LEFT lung clear. Abdominal pelvic viscera aside from ptotic   gallbladder unremarkable.    MR Head w/ IV Cont (06.24.19 @ 14:09)  IMPRESSION:   A cystic/necrotic, hemorrhagic mass with enhancing nodularity in the   right posterior frontal and parietal region, with involvement of the   splenium of the corpus callosum and crossing the midline. Extensive   T2/FLAIR hyperintensity surrounding this lesion suggesting vasogenic   edema and/or nonenhancing tumor. Two smaller enhancing lesions in the   right frontal lobe and left occipital lobe. Findings suggest primary   malignancy such as GBM versus metastatic disease.    CT Head No Cont (06.24.19 @ 11:21)  IMPRESSION:   A 4.6 cm cystic/necrotic mass with nodular components in the right   posterior frontal and parietal region with involvement of the right   splenium of the corpus callosum. Extensive surrounding vasogenic edema.   Right to left midline shift measuring 2 mm.
INTERVAL HPI/OVERNIGHT EVENTS:  Pt admitted 6/24 with new symptoms of dizziness, diff with the left side upper and lower extrem with coordination and overall weakness.  Pt denies headache.     MEDICATIONS  (STANDING):  lactated ringers. 1000 milliLiter(s) (60 mL/Hr) IV Continuous <Continuous>  levETIRAcetam 500 milliGRAM(s) Oral two times a day  pantoprazole    Tablet 40 milliGRAM(s) Oral before breakfast    MEDICATIONS  (PRN):  acetaminophen   Tablet .. 650 milliGRAM(s) Oral every 6 hours PRN Temp greater or equal to 38C (100.4F), Mild Pain (1 - 3)    Allergies  No Known Allergies  Intolerances    Vital Signs Last 24 Hrs  T(C): 36.7 (25 Jun 2019 11:41), Max: 37 (24 Jun 2019 16:14)  T(F): 98.1 (25 Jun 2019 11:41), Max: 98.6 (24 Jun 2019 16:14)  HR: 81 (25 Jun 2019 11:41) (68 - 81)  BP: 136/80 (25 Jun 2019 11:41) (136/80 - 160/83)  BP(mean): --  RR: 18 (25 Jun 2019 11:41) (18 - 19)  SpO2: 99% (25 Jun 2019 11:41) (96% - 99%) BMI (kg/m2): 23.8 (06-24-19 @ 09:39)      PHYSICAL EXAM  GENERAL: NAD, well-groomed, well-developed  HEAD:  Atraumatic, Normocephalic  EYES: EOMI, PERRLA, conjunctiva and sclera clear  ENMT: Moist mucous membranes, Good dentition, No lesions, neg facial  NECK: Supple, No JVD,   NERVOUS SYSTEM:  Alert & Oriented X3, Good concentration; Motor Strength 5/5 B/L upper and lower extremities; DTRs 2+ intact and symmetric, pos slight weakness on the left with strength of upper extrem -5/5, shoulder shrug -4/5  CHEST/LUNG: Clear bs bilaterally; No rales, rhonchi, wheezing, or rubs  HEART: Regular rate and rhythm; No murmurs, rubs, or gallops  ABDOMEN: Soft, Nontender, Nondistended; Bowel sounds present  EXTREMITIES:  2+ Peripheral Pulses, No clubbing, cyanosis, or edema  LYMPH: No lymphadenopathy noted  SKIN: No rashes or lesions      LABS:                          12.7   9.6   )-----------( 275      ( 25 Jun 2019 05:51 )             38.7     06-25    143  |  108<H>  |  11.0  ----------------------------<  89  4.0   |  23.0  |  0.84    Ca    8.5<L>      25 Jun 2019 05:51    TPro  6.6  /  Alb  3.2<L>  /  TBili  0.4  /  DBili  x   /  AST  17  /  ALT  14  /  AlkPhos  63  06-25    PT/INR - ( 25 Jun 2019 05:51 )   PT: 12.6 sec;   INR: 1.09 ratio         PTT - ( 24 Jun 2019 09:59 )  PTT:28.4 sec    I&O's Detail    RADIOLOGY & ADDITIONAL TESTS:  < from: CT Chest w/ IV Cont (06.24.19 @ 18:53) >   EXAM:  CT CHEST IC                          PROCEDURE DATE:  06/24/2019      < end of copied text >  < from: CT Chest w/ IV Cont (06.24.19 @ 18:53) >  IMPRESSION:     LARGE RIGHT LOWER LOBE LOBULATED PARTIALLY NECROTIC HYPOVASCULAR MASS   EXTENDING FROM THE SULCUS TO THE HILUM CONCERNING FOR ADENOCARCINOMA OF   THE LUNG. LEFT lung clear. Abdominal pelvic viscera aside from ptotic   gallbladder unremarkable.  < end of copied text >    < from: MR Head w/ IV Cont (06.24.19 @ 14:09) >   EXAM:  MR BRAIN IC                        PROCEDURE DATE:  06/24/2019    <IMPRESSION:   A cystic/necrotic, hemorrhagic mass with enhancing nodularity in the   right posterior frontal and parietal region, with involvement of the   splenium of the corpus callosum and crossing the midline. Extensive   T2/FLAIR hyperintensity surrounding this lesion suggesting vasogenic   edema and/or nonenhancing tumor. Two smaller enhancing lesions in the   right frontal lobe and left occipital lobe. Findings suggest primary   malignancy such as GBM versus metastatic disease.  < end of copied text >
Patient in bed. Continues to have reports of difficulty moving the left UE.  Discussed neuroplasticity and needing to use it so that the brain can establish connections to improve the movement.   Onc to follow up on biopsy.     FUNCTIONAL PROGRESS  6/27  Bathing Training:     · Level of [place]	stand-by assist; sponge	  · Physical Assist/Nonphysical Assist	1 person assist; nonverbal cues (demo/gestures); verbal cues; set-up required	    Upper Body Dressing Training:     · Level of [place]	minimum assist (75% patients effort); gown	  · Physical Assist/Nonphysical Assist	1 person assist; nonverbal cues (demo/gestures); verbal cues	    Lower Body Dressing Training:     · Level of [place]	minimum assist (75% patients effort); socks in sitting via LE crossing	  · Physical Assist/Nonphysical Assist	1 person assist; nonverbal cues (demo/gestures); verbal cues	    Toilet Hygiene Training:     · Level of [place]	stand-by assist; +urination on toilet during evaluation	  · Physical Assist/Nonphysical Assist	1 person assist; nonverbal cues (demo/gestures); verbal cues	    Grooming Training:     · Level of [place]	supervision; washing hands	  · Physical Assist/Nonphysical Assist	set-up required; verbal cues	    Eating/Self-Feeding Training:     · Level of [place]	supervision; not observed however pt with active orders for aspiration precautions	  · Physical Assist/Nonphysical Assist	set-up required; verbal cues	      REVIEW OF SYSTEMS  Constitutional - No fever,  No fatigue  HEENT - No vertigo, No neck pain  Neurological - No headaches, No memory loss, +loss of strength, +numbness, No tremors  Skin - No rashes, No lesions   Musculoskeletal - No joint pain, No joint swelling, No muscle pain  Psychiatric - No depression, +anxiety    VITALS  T(C): 36.5 (06-27-19 @ 08:05), Max: 36.7 (06-26-19 @ 23:33)  HR: 51 (06-27-19 @ 08:05) (51 - 55)  BP: 127/70 (06-27-19 @ 08:05) (121/74 - 137/70)  RR: 18 (06-27-19 @ 08:05) (18 - 20)  SpO2: 97% (06-27-19 @ 08:05) (93% - 98%)  Wt(kg): --    MEDICATIONS   acetaminophen   Tablet .. 650 milliGRAM(s) every 6 hours PRN  dexamethasone  Injectable 4 milliGRAM(s) every 6 hours  levETIRAcetam 500 milliGRAM(s) two times a day  pantoprazole    Tablet 40 milliGRAM(s) before breakfast      RECENT LABS - Reviewed                  ----------------------------------------------------------------------------------------  PHYSICAL EXAM  Constitutional - NAD, Comfortable  Extremities - No C/C/E, No calf tenderness   Neurologic Exam -                    Motor - Left UE weakness - impaired by coordination                    LEFT    UE - ShAB 5/5, EF 5/5, EE 5/5, WE 4/5,  5/5     Sensory - Intact to LT     Coordination - Impaired on the left **impaired proprioception of the toes  Psychiatric - Mood stable, Affect WNL  ----------------------------------------------------------------------------------------  ASSESSMENT/PLAN  68yFemale with functional deficits after found to have a cystic brain mass with a concurrent lung mass  Lung mass - s/p biopsy - pending results to determine plans, Hem/Onc following   Brain Mass - Observation, Decadron, Keppra for seizure PPX  Pain - Tylenol  DVT PPX - SCDs  Rehab - Dispo is dependent on Hem/Onc needing patient to stay in the hospital till biopsy results have returned. If patient can leave, would recommend AR now. If patient needs to wait for her biopsy results, and hence AR is delayed, patient may continue bedside therapy and be able to achieve DC HOME. In addition, AR will not provide transportation to facility if treatment is recommended and would have to be delayed until DC HOME from AR.     Will continue to follow. Functional progress will determine ongoing rehab dispo recommendations, which may change.    Continue bedside therapy as well as OOB throughout the day with mobilization by staff to maintain cardiopulmonary function and prevention of secondary complications related to debility.
Patient in bed. Discussed at length her wishes.   She understands that she could benefit from intense rehab but wants her family with her and home would provide her the support that she needs at this time.   Was told that she does not need to stay in the hospital to await results of her biopsy as per Hem/Onc.  Patient wants HOME with HOME care.   Contacted therapy manager and will provide follow up.     FUNCTIONAL PROGRESS  6/27  Transfer: Sit to Stand:     · Level of [place]	minimum assist (75% patients effort)	  · Physical Assist/Nonphysical Assist	1 person assist; verbal cues; nonverbal cues (demo/gestures)	  · Weight-Bearing Restrictions	weight-bearing as tolerated	  · Assistive Device	rolling walker	    Transfer: Stand to Sit:     · Level of [place]	minimum assist (75% patients effort)	  · Physical Assist/Nonphysical Assist	1 person assist; nonverbal cues (demo/gestures); verbal cues	  · Weight-Bearing Restrictions	weight-bearing as tolerated	  · Assistive Device	rolling walker	    Transfer: Toilet Transfer:     · Level of [place]	minimum assist (75% patients effort)	  · Physical Assist/Nonphysical Assist	1 person assist; nonverbal cues (demo/gestures); verbal cues	  · Weight-Bearing Restrictions	weight-bearing as tolerated	  · Assistive Device	rolling walker; elevated toilet seat with right grab bar; recommend use of commode over standard toilet at home to increase surface height and provide armrests	    Bathing Training:     · Level of [place]	stand-by assist; sponge	  · Physical Assist/Nonphysical Assist	1 person assist; nonverbal cues (demo/gestures); verbal cues; set-up required	    Upper Body Dressing Training:     · Level of [place]	minimum assist (75% patients effort); gown	  · Physical Assist/Nonphysical Assist	1 person assist; nonverbal cues (demo/gestures); verbal cues	    Lower Body Dressing Training:     · Level of [place]	minimum assist (75% patients effort); socks in sitting via LE crossing	  · Physical Assist/Nonphysical Assist	1 person assist; nonverbal cues (demo/gestures); verbal cues	    Toilet Hygiene Training:     · Level of [place]	stand-by assist; +urination on toilet during evaluation	  · Physical Assist/Nonphysical Assist	1 person assist; nonverbal cues (demo/gestures); verbal cues	    Grooming Training:     · Level of [place]	supervision; washing hands	  · Physical Assist/Nonphysical Assist	set-up required; verbal cues	    Eating/Self-Feeding Training:     · Level of [place]	supervision; not observed however pt with active orders for aspiration precautions	  · Physical Assist/Nonphysical Assist	set-up required; verbal cues	    	  REVIEW OF SYSTEMS  Constitutional - No fever,  +fatigue  HEENT - No vertigo, No neck pain  Neurological - No headaches, No memory loss, +loss of strength, +numbness, No tremors  Skin - No rashes, No lesions   Musculoskeletal - No joint pain, No joint swelling, No muscle pain  Psychiatric - No depression, +anxiety    VITALS  T(C): 36.7 (06-28-19 @ 07:51), Max: 36.7 (06-28-19 @ 07:51)  HR: 59 (06-28-19 @ 07:51) (59 - 86)  BP: 160/82 (06-28-19 @ 07:51) (135/62 - 160/82)  RR: 20 (06-28-19 @ 07:51) (18 - 20)  SpO2: 97% (06-27-19 @ 23:02) (95% - 97%)  Wt(kg): --    MEDICATIONS   acetaminophen   Tablet .. 650 milliGRAM(s) every 6 hours PRN  dexamethasone  Injectable 4 milliGRAM(s) every 6 hours  levETIRAcetam 500 milliGRAM(s) two times a day  pantoprazole    Tablet 40 milliGRAM(s) before breakfast      RECENT LABS - Reviewed                    ----------------------------------------------------------------------------------------  PHYSICAL EXAM  Constitutional - NAD, Comfortable  Extremities - No C/C/E, No calf tenderness   Neurologic Exam -                    Motor - Left UE weakness - impaired by coordination                    LEFT    UE - ShAB 5/5, EF 5/5, EE 5/5, WE 4/5,  5/5     Sensory - Intact to LT     Coordination - Impaired on the left **impaired proprioception of the toes  Psychiatric - Mood stable, Affect WNL  ----------------------------------------------------------------------------------------  ASSESSMENT/PLAN  68yFemale with functional deficits after found to have a cystic brain mass with a concurrent lung mass  Lung mass - s/p biopsy - pending results to determine plans, Hem/Onc following   Brain Mass - Observation, Decadron, Keppra for seizure PPX  Pain - Tylenol  DVT PPX - SCDs  Rehab - Support patient's wishes to go home. CM aware and PT and OT to see patient.    Continue bedside therapy as well as OOB throughout the day with mobilization by staff to maintain cardiopulmonary function and prevention of secondary complications related to debility. 	    Will sign off, please reconsult for additional rehab dispo needs if functional status changes.
· Subjective and Objective: 	  DHARA MARSH  68y  Female  [id number]      Patient is a 68y old  Female who presents with a chief complaint of Brain mass (24 Jun 2019 19:32)    HPI:  67 y/o F with a PMH of Carpal tunnel syndrome, former smoker quit 7-8 years ago, and wrist fracture repair in R hand; as per patient she was in her usual state of health she was feeling like she is walking wobbly and felt like about to fall down and that feeling was worsening, she recently went to [place] on a cruise from 6/9/2019-6/, on the second day of the cruise she felt some motion sickness but attributed this to the cruise, States she has been on other cruises without symptoms, few years ago she went to a [facility]    Cruise but not recently, she has no fever, chills, headaches, problem speaking, blurry or double vision, she had no nausea, vomiting, she did not fall down, she has no weakness or numbness of any specific area of the body. (24 Jun 2019 19:32)    PAST MEDICAL & SURGICAL HISTORY:  No pertinent past medical history  Carpal tunnel syndrome    FAMILY HISTORY:  No pertinent family history in first degree relatives    REVIEW OF SYSTEMS  Left sided weakness  Wobbliness  All other ROS is neg  Lost weight on a diet and exercise.    General:  See HPI  Weakness.  Wobbliness	  	    PHYSICAL EXAM:      Constitutional: No sweats, fever or bleeding    Eyes: Neg    ENMT: Neg    Neck: No masses  Mo LAD    Breasts: NE    Back: Clear    Respiratory: Clear   Mo rales or wheezes    Cardiovascular: RR    Gastrointestinal: No abn.    Genitourinary: NE    Rectal: NE    Extremities: No CCE  or DVT    Vascular: Normal appearing    Neurological: left sided weakness but moving all extremities    No LAD in the neck      BW has been stable    < from: CT Chest w/ IV Cont (06.24.19 @ 18:53) >     EXAM:  CT CHEST IC                          PROCEDURE DATE:  06/24/2019          INTERPRETATION:  CT of the chest, abdomen and pelvis.   COMPARISON: None  CLINICAL INFORMATION: Brain mass. Assess for metastasis or primary   carcinoma.  PROCEDURE:   100 ml of Omnipaque was injected intravenously. None was discarded  2.5 mm axial slice thickness images were obtained. Coronal and sagittal   reformats were also obtained.     FINDINGS:  There is a large RIGHT lower lobe lobulated heterogeneous hypervascular   mass extending from the hilum to the posterior sulcus of RIGHT lower lobe   measuring 9.3 x 4.0 x 7.8 cm and concerning for adenocarcinoma of the   RIGHT lower lobe. The mass extends extends into the RIGHT hilum encasing   the RIGHT lower lobe pulmonary arteries and occluding the RIGHT lower   lobe bronchus. Peripheral basilar satellite lesions noted adjacent to the   main mass.  RIGHT upper lobe, and LEFT lung parenchyma remain clear.  Cardiac chambers remain normal in size without pericardial effusion. No   bulky mediastinal adenopathy.    No associated pleural effusion seen. No evidence of pulmonary embolus.    There is no free intra-abdominal air or ascites.   The gallbladder is distended ptotic without signs of acute cholecystitis.   Common bile duct measures 5.7 mm. No intrahepatic biliary duct dilatation   seen.  The liver, spleen, pancreas, adrenal glands, are normal.    The stomach, duodenum, small and large bowel and appendix are within   normal limits.    Both kidneys show normal uptake of contrast media without masses or   hydronephrosis.      The urinary bladder shows normal morphology and contour.      Uterus and adnexa unremarkable.      There are no retroperitoneal masses or abnormal lymphadenopathy.  The retroperitoneal vessels show atherosclerotic calcified plaques   throughout  nondilated abdominal aorta and iliac arteries. Focal   infrarenal abdominal aortic aneurysm measures 2.6 cm. The bones and soft   tissues are intact.    IMPRESSION:     LARGE RIGHT LOWER LOBE LOBULATED PARTIALLY NECROTIC HYPOVASCULAR MASS   EXTENDING FROM THE SULCUS TO THE HILUM CONCERNING FOR ADENOCARCINOMA OF   THE LUNG. LEFT lung clear. Abdominal pelvic viscera aside from ptotic   gallbladder unremarkable.    < end of copied text >  < from: MR Head w/ IV Cont (06.24.19 @ 14:09) >     EXAM:  MR BRAIN IC                          PROCEDURE DATE:  06/24/2019          INTERPRETATION:  CLINICAL INFORMATION: mass vs infection. . ADMDIAG1:   G93.9 DISORDER OF BRAIN, UNSPECIFIED/.    TECHNIQUE: Multiplanar, multisequence brain MRI was performed without   intravenous contrast    COMPARISON: CT head 6/24/2019.    FINDINGS:    Some images are degraded by motion.    Redemonstration of a cystic/necrotic mass with enhancing nodularity in   the right posterior frontal and parietal region measuring 4.8 x 3.8 x 3.2   cm. The enhancing nodular component along the medial aspect of the mass   involves the splenium of the corpus callosum and crosses the midline.   There is extensive T2/FLAIR hyperintensity surrounding this mass   suggestingvasogenic edema and/or nonenhancing tumor. There are foci of   susceptibility artifact in the lesion compatible with blood products. The   cystic component of the mass contains layering hemorrhage. The solid   enhancing component of this lesion demonstrates restricted diffusion   suggesting hypercellularity.  Two smaller enhancing lesions are noted,   one in the high right frontal lobe measuring 0.8 x 0.7 x 0.9 cm (10:26)   and the other in the left occipital lobe measuring 0.5 x 0.4 x 0.4 cm   (10:13).     There is cerebral volume loss.  There is no evidence of acute infarct.   Scattered foci of T2/FLAIR hyperintensity in the bilateral   periventricular white matter are nonspecific but likely related to   chronic white matter microvascular ischemic disease.    Flow voids of the major intracranial vessels at the skull base follow   expected course and contour.    Mild bilateral maxillary sinus mucosal thickening. Mild bilateral mastoid   mucosal thickening. Bilateral orbits are within normal limits.    IMPRESSION:   A cystic/necrotic, hemorrhagic mass with enhancing nodularity in the   right posterior frontal and parietal region, with involvement of the   splenium of the corpus callosum and crossing the midline. Extensive   T2/FLAIR hyperintensity surrounding this lesion suggesting vasogenic   edema and/or nonenhancing tumor. Two smaller enhancing lesions in the   right frontal lobe and left occipital lobe. Findings suggest primary   malignancy such as GBM versus metastatic disease.    < end of copied text >            Assessment and Recommendation:   		  Likely lung cancer with brain mets  Had a lung bx 6/26/19  Not back yet  Will check and advise  Can be FU as an outpatient    Office [address] [place] [address]  [address]  [place] [address]  Office: [phone]  Ans. [facility]: [phone]  cell: [phone]    Thank you
· Subjective and Objective: 	  DHARA MARSH  68y  Female  [id number]      Patient is a 68y old  Female who presents with a chief complaint of Brain mass (24 Jun 2019 19:32)    HPI:  69 y/o F with a PMH of Carpal tunnel syndrome, former smoker quit 7-8 years ago, and wrist fracture repair in R hand; as per patient she was in her usual state of health she was feeling like she is walking wobbly and felt like about to fall down and that feeling was worsening, she recently went to [place] on a cruise from 6/9/2019-6/, on the second day of the cruise she felt some motion sickness but attributed this to the cruise, States she has been on other cruises without symptoms, few years ago she went to a [facility]    Cruise but not recently, she has no fever, chills, headaches, problem speaking, blurry or double vision, she had no nausea, vomiting, she did not fall down, she has no weakness or numbness of any specific area of the body. (24 Jun 2019 19:32)    PAST MEDICAL & SURGICAL HISTORY:  No pertinent past medical history  Carpal tunnel syndrome    FAMILY HISTORY:  No pertinent family history in first degree relatives    REVIEW OF SYSTEMS  Left sided weakness  Wobbliness  All other ROS is neg  Lost weight on a diet and exercise.    General:  See HPI  Weakness.  Wobbliness	  	    PHYSICAL EXAM:      Constitutional: No sweats, fever or bleeding    Eyes: Neg    ENMT: Neg    Neck: No masses  Mo LAD    Breasts: NE    Back: Clear    Respiratory: Clear   Mo rales or wheezes    Cardiovascular: RR    Gastrointestinal: No abn.    Genitourinary: NE    Rectal: NE    Extremities: No CCE  or DVT    Vascular: Normal appearing    Neurological: left sided weakness but moving all extremities    No LAD in the neck    CBC Full  -  ( 25 Jun 2019 05:51 )  WBC Count : 9.6 K/uL  RBC Count : 4.12 M/uL  Hemoglobin : 12.7 g/dL  Hematocrit : 38.7 %  Platelet Count - Automated : 275 K/uL  Mean Cell Volume : 93.9 fl  Mean Cell Hemoglobin : 30.8 pg  Mean Cell Hemoglobin Concentration : 32.8 g/dL  Auto Neutrophil # : 5.7 K/uL  Auto Lymphocyte # : 2.7 K/uL  Auto Monocyte # : 1.0 K/uL  Auto Eosinophil # : 0.2 K/uL  Auto Basophil # : 0.0 K/uL  Auto Neutrophil % : 59.6 %  Auto Lymphocyte % : 27.9 %  Auto Monocyte % : 10.3 %  Auto Eosinophil % : 1.8 %  Auto Basophil % : 0.3 %    06-25    143  |  108<H>  |  11.0  ----------------------------<  89  4.0   |  23.0  |  0.84    Ca    8.5<L>      25 Jun 2019 05:51    TPro  6.6  /  Alb  3.2<L>  /  TBili  0.4  /  DBili  x   /  AST  17  /  ALT  14  /  AlkPhos  63  06-25        < from: CT Chest w/ IV Cont (06.24.19 @ 18:53) >     EXAM:  CT CHEST IC                          PROCEDURE DATE:  06/24/2019          INTERPRETATION:  CT of the chest, abdomen and pelvis.   COMPARISON: None  CLINICAL INFORMATION: Brain mass. Assess for metastasis or primary   carcinoma.  PROCEDURE:   100 ml of Omnipaque was injected intravenously. None was discarded  2.5 mm axial slice thickness images were obtained. Coronal and sagittal   reformats were also obtained.     FINDINGS:  There is a large RIGHT lower lobe lobulated heterogeneous hypervascular   mass extending from the hilum to the posterior sulcus of RIGHT lower lobe   measuring 9.3 x 4.0 x 7.8 cm and concerning for adenocarcinoma of the   RIGHT lower lobe. The mass extends extends into the RIGHT hilum encasing   the RIGHT lower lobe pulmonary arteries and occluding the RIGHT lower   lobe bronchus. Peripheral basilar satellite lesions noted adjacent to the   main mass.  RIGHT upper lobe, and LEFT lung parenchyma remain clear.  Cardiac chambers remain normal in size without pericardial effusion. No   bulky mediastinal adenopathy.    No associated pleural effusion seen. No evidence of pulmonary embolus.    There is no free intra-abdominal air or ascites.   The gallbladder is distended ptotic without signs of acute cholecystitis.   Common bile duct measures 5.7 mm. No intrahepatic biliary duct dilatation   seen.  The liver, spleen, pancreas, adrenal glands, are normal.    The stomach, duodenum, small and large bowel and appendix are within   normal limits.    Both kidneys show normal uptake of contrast media without masses or   hydronephrosis.      The urinary bladder shows normal morphology and contour.      Uterus and adnexa unremarkable.      There are no retroperitoneal masses or abnormal lymphadenopathy.  The retroperitoneal vessels show atherosclerotic calcified plaques   throughout  nondilated abdominal aorta and iliac arteries. Focal   infrarenal abdominal aortic aneurysm measures 2.6 cm. The bones and soft   tissues are intact.    IMPRESSION:     LARGE RIGHT LOWER LOBE LOBULATED PARTIALLY NECROTIC HYPOVASCULAR MASS   EXTENDING FROM THE SULCUS TO THE HILUM CONCERNING FOR ADENOCARCINOMA OF   THE LUNG. LEFT lung clear. Abdominal pelvic viscera aside from ptotic   gallbladder unremarkable.    < end of copied text >  < from: MR Head w/ IV Cont (06.24.19 @ 14:09) >     EXAM:  MR BRAIN IC                          PROCEDURE DATE:  06/24/2019          INTERPRETATION:  CLINICAL INFORMATION: mass vs infection. . ADMDIAG1:   G93.9 DISORDER OF BRAIN, UNSPECIFIED/.    TECHNIQUE: Multiplanar, multisequence brain MRI was performed without   intravenous contrast    COMPARISON: CT head 6/24/2019.    FINDINGS:    Some images are degraded by motion.    Redemonstration of a cystic/necrotic mass with enhancing nodularity in   the right posterior frontal and parietal region measuring 4.8 x 3.8 x 3.2   cm. The enhancing nodular component along the medial aspect of the mass   involves the splenium of the corpus callosum and crosses the midline.   There is extensive T2/FLAIR hyperintensity surrounding this mass   suggestingvasogenic edema and/or nonenhancing tumor. There are foci of   susceptibility artifact in the lesion compatible with blood products. The   cystic component of the mass contains layering hemorrhage. The solid   enhancing component of this lesion demonstrates restricted diffusion   suggesting hypercellularity.  Two smaller enhancing lesions are noted,   one in the high right frontal lobe measuring 0.8 x 0.7 x 0.9 cm (10:26)   and the other in the left occipital lobe measuring 0.5 x 0.4 x 0.4 cm   (10:13).     There is cerebral volume loss.  There is no evidence of acute infarct.   Scattered foci of T2/FLAIR hyperintensity in the bilateral   periventricular white matter are nonspecific but likely related to   chronic white matter microvascular ischemic disease.    Flow voids of the major intracranial vessels at the skull base follow   expected course and contour.    Mild bilateral maxillary sinus mucosal thickening. Mild bilateral mastoid   mucosal thickening. Bilateral orbits are within normal limits.    IMPRESSION:   A cystic/necrotic, hemorrhagic mass with enhancing nodularity in the   right posterior frontal and parietal region, with involvement of the   splenium of the corpus callosum and crossing the midline. Extensive   T2/FLAIR hyperintensity surrounding this lesion suggesting vasogenic   edema and/or nonenhancing tumor. Two smaller enhancing lesions in the   right frontal lobe and left occipital lobe. Findings suggest primary   malignancy such as GBM versus metastatic disease.    < end of copied text >            Assessment and Recommendation:   		  Likely lung cancer with brain mets  had lung bx done today.  Will check and advise    Thank you
SUBJECTIVE: Patient seen and examined at bedside.  Comfortable, in NAD.  Denies any headaches, nausea, vomiting.  Patient's  and son at bedside.    CHIEF COMPLAINT: brain mass, lung lesion    OVERNIGHT EVENTS: none    Vital Signs Last 24 Hrs  T(C): 36.4 (26 Jun 2019 08:20), Max: 37.1 (25 Jun 2019 18:46)  T(F): 97.6 (26 Jun 2019 08:20), Max: 98.8 (25 Jun 2019 18:46)  HR: 70 (26 Jun 2019 11:15) (51 - 82)  BP: 140/70 (26 Jun 2019 11:15) (129/78 - 155/86)  BP(mean): 92 (26 Jun 2019 11:01) (92 - 105)  RR: 18 (26 Jun 2019 11:15) (12 - 24)  SpO2: 93% (26 Jun 2019 11:15) (93% - 98%)    PHYSICAL EXAM:    General: No Acute Distress     Neurological: Awake, alert and oriented to self, place and date.  Speech clear.  Follow commands, COOK.  EOMI, no facial.  LUE 4+/5 distally, otherwise 5/5.  +Lt dysmetria.    Pulmonary: Clear to Auscultation, No Rales, No Rhonchi, No Wheezes     Cardiovascular: S1, S2, Regular Rate and Rhythm     Gastrointestinal: Soft, Nontender, Nondistended     LABS:                        12.7   9.6   )-----------( 275      ( 25 Jun 2019 05:51 )             38.7    06-25    143  |  108<H>  |  11.0  ----------------------------<  89  4.0   |  23.0  |  0.84    Ca    8.5<L>      25 Jun 2019 05:51    TPro  6.6  /  Alb  3.2<L>  /  TBili  0.4  /  DBili  x   /  AST  17  /  ALT  14  /  AlkPhos  63  06-25  PT/INR - ( 25 Jun 2019 05:51 )   PT: 12.6 sec;   INR: 1.09 ratio               06-25 @ 07:01  -  06-26 @ 07:00  --------------------------------------------------------  IN: 780 mL / OUT: 0 mL / NET: 780 mL      MEDICATIONS:  Antibiotics:    Neuro:  acetaminophen   Tablet .. 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 LEFT lung clear. Abdominal pelvic viscera aside from ptotic   gallbladder unremarkable.    < end of copied text >    < from: MR Head w/ IV Cont (06.24.19 @ 14:09) >  A cystic/necrotic, hemorrhagic mass with enhancing nodularity in the   right posterior frontal and parietal region, with involvement of the   splenium of the corpus callosum and crossing the midline. Extensive   T2/FLAIR hyperintensity surrounding this lesion suggesting vasogenic   edema and/or nonenhancing tumor. Two smaller enhancing lesions in the   right frontal lobe and left occipital lobe. Findings suggest primary   malignancy such as GBM versus metastatic disease.    < end of copied text >  < from: CT Head No Cont (06.24.19 @ 11:21) >  A 4.6 cm cystic/necrotic mass with nodular components in the right   posterior frontal and parietal region with involvement of the right   splenium of the corpus callosum. Extensive surrounding vasogenic edema.   Right to left midline shift measuring 2 mm.    < end of copied text >
DHARA MARSH    [id number]    68y      Female    Patient is a 68y old  Female who presents with a chief complaint of Brain mass (24 Jun 2019 19:52)      INTERVAL HPI/OVERNIGHT EVENTS:    Patient is feeling some incoordination with activity, denies fever, chills, chest pain, headache, blurry vision.       REVIEW OF SYSTEMS:    CONSTITUTIONAL: No fever, some fatigue  RESPIRATORY: No cough, No shortness of breath  CARDIOVASCULAR: No chest pain, palpitations  GASTROINTESTINAL: No abdominal, No nausea, vomiting  NEUROLOGICAL: No headaches,  loss of strength.  MISCELLANEOUS: No joint swelling or pain       Vital Signs Last 24 Hrs  T(C): 36.7 (25 Jun 2019 07:59), Max: 37 (24 Jun 2019 16:14)  T(F): 98.1 (25 Jun 2019 07:59), Max: 98.6 (24 Jun 2019 16:14)  HR: 68 (25 Jun 2019 07:59) (68 - 87)  BP: 156/90 (25 Jun 2019 07:59) (122/70 - 160/83)  RR: 18 (25 Jun 2019 07:59) (18 - 19)  SpO2: 96% (25 Jun 2019 07:59) (96% - 99%)    PHYSICAL EXAM:    GENERAL: Elderly female looking comfortable   HEENT: PERRL, +EOMI  NECK: soft, Supple, No JVD,   CHEST/LUNG: Clear to auscultate bilaterally; No wheezing  HEART: S1S2+, Regular rate and rhythm; No murmurs  ABDOMEN: Soft, Nontender, Nondistended; Bowel sounds present  EXTREMITIES:  2+ Peripheral Pulses, No edema  SKIN: No rashes or lesions  NEURO: AAOX3, no focal deficits, no motor r sensory loss  PSYCH: normal mood      LABS:                        12.7   9.6   )-----------( 275      ( 25 Jun 2019 05:51 )             38.7     06-25    143  |  108<H>  |  11.0  ----------------------------<  89  4.0   |  23.0  |  0.84    Ca    8.5<L>      25 Jun 2019 05:51    TPro  6.6  /  Alb  3.2<L>  /  TBili  0.4  /  DBili  x   /  AST  17  /  ALT  14  /  AlkPhos  63  06-25    PT/INR - ( 25 Jun 2019 05:51 )   PT: 12.6 sec;   INR: 1.09 ratio         PTT - ( 24 Jun 2019 09:59 )  PTT:28.4 sec        I&O's Summary      MEDICATIONS  (STANDING):  lactated ringers. 1000 milliLiter(s) (60 mL/Hr) IV Continuous <Continuous>  levETIRAcetam 500 milliGRAM(s) Oral two times a day  pantoprazole    Tablet 40 milliGRAM(s) Oral before breakfast    MEDICATIONS  (PRN):  acetaminophen   Tablet .. 650 milliGRAM(s) Oral every 6 hours PRN Temp greater or equal to 38C (100.4F), Mild Pain (1 - 3)

## 2019-06-28 NOTE — PROGRESS NOTE ADULT - ATTENDING COMMENTS
NSGY Attg:    see above    patient seen    agree with exam as above    s/o bx of right lung lesion earlier today    continue Decadron  additional recs pending pathology
NSGY Attg:    see above    patient seen and examined    agree with exam as above    pathology pending; possible neurosurgical intervention for large cystic lesion pending pathology and interdisciplinary discussion with oncology/radiation/tumor board
NSGY Attg:    see above    patient seen; no acute events    path pending    patient/ wish to be discharged and f/u as outpatient for additional discussion of plan pending path results; they intend to obtain an additional opinion at [facility]
NSGY Attg:    see above    patient seen; agree with exam as documented    MRI brain reviewed -- large right cystic lesion with solid/enhancing component involving corpus callosum/crossing midline; 2 small sub-centimeter areas of enhancement; + edema  CT torso reviewed -- + right lung mass    given findings on CT torso, intracranial lesions likely represent metastasis    biopsy of lung lesion for pathology scheduled for tomorrow    imaging findings and plan as above discussed with patient's

## 2019-06-28 NOTE — PROGRESS NOTE ADULT - PROVIDER SPECIALTY LIST ADULT
Heme/Onc
Hospitalist
Hospitalist
Infectious Disease
Neurosurgery
Rehab Medicine
Rehab Medicine
Neurosurgery
Hospitalist

## 2019-06-28 NOTE — DISCHARGE NOTE PROVIDER - PROVIDER TOKENS
PROVIDER:[TOKEN:[[id number]]],PROVIDER:[TOKEN:[[id number]]],FREE:[LAST:[Jitendra],FIRST:[Minh],PHONE:[(   )    -],FAX:[(   )    -],ADDRESS:[Office [address] [place] [address] [address]  [place] [id number]  Office: [phone]  Ans. [facility]: [phone]  cell: [phone]]]

## 2019-06-28 NOTE — PROGRESS NOTE ADULT - REASON FOR ADMISSION
Brain mass

## 2019-06-29 ENCOUNTER — INPATIENT (INPATIENT)
Facility: HOSPITAL | Age: 69
LOS: 5 days | Discharge: HOME | DRG: 25 | End: 2019-07-05
Attending: INTERNAL MEDICINE | Admitting: GENERAL ACUTE CARE HOSPITAL
Payer: MEDICARE

## 2019-06-29 ENCOUNTER — RESULT REVIEW (OUTPATIENT)
Age: 69
End: 2019-06-29

## 2019-06-29 VITALS
DIASTOLIC BLOOD PRESSURE: 95 MMHG | SYSTOLIC BLOOD PRESSURE: 169 MMHG | HEART RATE: 58 BPM | WEIGHT: 132.06 LBS | TEMPERATURE: 98 F | OXYGEN SATURATION: 98 % | RESPIRATION RATE: 18 BRPM | HEIGHT: 62 IN

## 2019-06-29 DIAGNOSIS — G56.00 CARPAL TUNNEL SYNDROME, UNSPECIFIED UPPER LIMB: Chronic | ICD-10-CM

## 2019-06-29 DIAGNOSIS — R42 DIZZINESS AND GIDDINESS: ICD-10-CM

## 2019-06-29 LAB
ALBUMIN SERPL ELPH-MCNC: 3.9 G/DL — SIGNIFICANT CHANGE UP (ref 3.3–5.2)
ALP SERPL-CCNC: 78 U/L — SIGNIFICANT CHANGE UP (ref 40–120)
ALT FLD-CCNC: 49 U/L — HIGH
ANION GAP SERPL CALC-SCNC: 13 MMOL/L — SIGNIFICANT CHANGE UP (ref 5–17)
APPEARANCE UR: CLEAR — SIGNIFICANT CHANGE UP
APTT BLD: 23 SEC — LOW (ref 27.5–36.3)
AST SERPL-CCNC: 30 U/L — SIGNIFICANT CHANGE UP
BACTERIA # UR AUTO: NEGATIVE — SIGNIFICANT CHANGE UP
BASOPHILS # BLD AUTO: 0.04 K/UL — SIGNIFICANT CHANGE UP (ref 0–0.2)
BASOPHILS NFR BLD AUTO: 0.2 % — SIGNIFICANT CHANGE UP (ref 0–2)
BILIRUB SERPL-MCNC: 0.4 MG/DL — SIGNIFICANT CHANGE UP (ref 0.4–2)
BILIRUB UR-MCNC: NEGATIVE — SIGNIFICANT CHANGE UP
BLD GP AB SCN SERPL QL: SIGNIFICANT CHANGE UP
BUN SERPL-MCNC: 21 MG/DL — HIGH (ref 8–20)
CALCIUM SERPL-MCNC: 8.9 MG/DL — SIGNIFICANT CHANGE UP (ref 8.6–10.2)
CHLORIDE SERPL-SCNC: 102 MMOL/L — SIGNIFICANT CHANGE UP (ref 98–107)
CO2 SERPL-SCNC: 24 MMOL/L — SIGNIFICANT CHANGE UP (ref 22–29)
COLOR SPEC: YELLOW — SIGNIFICANT CHANGE UP
CREAT SERPL-MCNC: 0.75 MG/DL — SIGNIFICANT CHANGE UP (ref 0.5–1.3)
CULTURE RESULTS: SIGNIFICANT CHANGE UP
CULTURE RESULTS: SIGNIFICANT CHANGE UP
DIFF PNL FLD: NEGATIVE — SIGNIFICANT CHANGE UP
EOSINOPHIL # BLD AUTO: 0 K/UL — SIGNIFICANT CHANGE UP (ref 0–0.5)
EOSINOPHIL NFR BLD AUTO: 0 % — SIGNIFICANT CHANGE UP (ref 0–6)
EPI CELLS # UR: SIGNIFICANT CHANGE UP
GLUCOSE SERPL-MCNC: 128 MG/DL — HIGH (ref 70–115)
GLUCOSE UR QL: NEGATIVE MG/DL — SIGNIFICANT CHANGE UP
HCT VFR BLD CALC: 41.6 % — SIGNIFICANT CHANGE UP (ref 34.5–45)
HGB BLD-MCNC: 14.1 G/DL — SIGNIFICANT CHANGE UP (ref 11.5–15.5)
IMM GRANULOCYTES NFR BLD AUTO: 1.9 % — HIGH (ref 0–1.5)
INR BLD: 1.02 RATIO — SIGNIFICANT CHANGE UP (ref 0.88–1.16)
KETONES UR-MCNC: NEGATIVE — SIGNIFICANT CHANGE UP
LEUKOCYTE ESTERASE UR-ACNC: NEGATIVE — SIGNIFICANT CHANGE UP
LYMPHOCYTES # BLD AUTO: 1.36 K/UL — SIGNIFICANT CHANGE UP (ref 1–3.3)
LYMPHOCYTES # BLD AUTO: 7.1 % — LOW (ref 13–44)
MCHC RBC-ENTMCNC: 31.1 PG — SIGNIFICANT CHANGE UP (ref 27–34)
MCHC RBC-ENTMCNC: 33.9 GM/DL — SIGNIFICANT CHANGE UP (ref 32–36)
MCV RBC AUTO: 91.6 FL — SIGNIFICANT CHANGE UP (ref 80–100)
MONOCYTES # BLD AUTO: 1.35 K/UL — HIGH (ref 0–0.9)
MONOCYTES NFR BLD AUTO: 7.1 % — SIGNIFICANT CHANGE UP (ref 2–14)
NEUTROPHILS # BLD AUTO: 15.92 K/UL — HIGH (ref 1.8–7.4)
NEUTROPHILS NFR BLD AUTO: 83.7 % — HIGH (ref 43–77)
NITRITE UR-MCNC: NEGATIVE — SIGNIFICANT CHANGE UP
PH UR: 7 — SIGNIFICANT CHANGE UP (ref 5–8)
PLATELET # BLD AUTO: 332 K/UL — SIGNIFICANT CHANGE UP (ref 150–400)
POTASSIUM SERPL-MCNC: 3.8 MMOL/L — SIGNIFICANT CHANGE UP (ref 3.5–5.3)
POTASSIUM SERPL-SCNC: 3.8 MMOL/L — SIGNIFICANT CHANGE UP (ref 3.5–5.3)
PROT SERPL-MCNC: 7.6 G/DL — SIGNIFICANT CHANGE UP (ref 6.6–8.7)
PROT UR-MCNC: 15 MG/DL
PROTHROM AB SERPL-ACNC: 11.7 SEC — SIGNIFICANT CHANGE UP (ref 10–12.9)
RBC # BLD: 4.54 M/UL — SIGNIFICANT CHANGE UP (ref 3.8–5.2)
RBC # FLD: 14 % — SIGNIFICANT CHANGE UP (ref 10.3–14.5)
RBC CASTS # UR COMP ASSIST: NEGATIVE /HPF — SIGNIFICANT CHANGE UP (ref 0–4)
SODIUM SERPL-SCNC: 139 MMOL/L — SIGNIFICANT CHANGE UP (ref 135–145)
SP GR SPEC: 1.01 — SIGNIFICANT CHANGE UP (ref 1.01–1.02)
SPECIMEN SOURCE: SIGNIFICANT CHANGE UP
SPECIMEN SOURCE: SIGNIFICANT CHANGE UP
UROBILINOGEN FLD QL: NEGATIVE MG/DL — SIGNIFICANT CHANGE UP
WBC # BLD: 19.03 K/UL — HIGH (ref 3.8–10.5)
WBC # FLD AUTO: 19.03 K/UL — HIGH (ref 3.8–10.5)
WBC UR QL: SIGNIFICANT CHANGE UP

## 2019-06-29 PROCEDURE — 88112 CYTOPATH CELL ENHANCE TECH: CPT | Mod: 26

## 2019-06-29 PROCEDURE — 99223 1ST HOSP IP/OBS HIGH 75: CPT

## 2019-06-29 PROCEDURE — 70450 CT HEAD/BRAIN W/O DYE: CPT | Mod: 26

## 2019-06-29 PROCEDURE — 71045 X-RAY EXAM CHEST 1 VIEW: CPT | Mod: 26

## 2019-06-29 PROCEDURE — 99284 EMERGENCY DEPT VISIT MOD MDM: CPT

## 2019-06-29 RX ORDER — DEXAMETHASONE 0.5 MG/5ML
4 ELIXIR ORAL EVERY 6 HOURS
Refills: 0 | Status: DISCONTINUED | OUTPATIENT
Start: 2019-06-29 | End: 2019-06-29

## 2019-06-29 RX ORDER — DEXAMETHASONE 0.5 MG/5ML
4 ELIXIR ORAL EVERY 6 HOURS
Refills: 0 | Status: DISCONTINUED | OUTPATIENT
Start: 2019-06-29 | End: 2019-07-02

## 2019-06-29 RX ORDER — ONDANSETRON 8 MG/1
4 TABLET, FILM COATED ORAL ONCE
Refills: 0 | Status: COMPLETED | OUTPATIENT
Start: 2019-06-29 | End: 2019-06-29

## 2019-06-29 RX ORDER — ONDANSETRON 8 MG/1
8 TABLET, FILM COATED ORAL EVERY 8 HOURS
Refills: 0 | Status: DISCONTINUED | OUTPATIENT
Start: 2019-06-29 | End: 2019-07-02

## 2019-06-29 RX ORDER — MECLIZINE HCL 12.5 MG
25 TABLET ORAL
Refills: 0 | Status: DISCONTINUED | OUTPATIENT
Start: 2019-06-29 | End: 2019-07-02

## 2019-06-29 RX ORDER — PANTOPRAZOLE SODIUM 20 MG/1
40 TABLET, DELAYED RELEASE ORAL
Refills: 0 | Status: DISCONTINUED | OUTPATIENT
Start: 2019-06-29 | End: 2019-06-30

## 2019-06-29 RX ORDER — LEVETIRACETAM 250 MG/1
500 TABLET, FILM COATED ORAL ONCE
Refills: 0 | Status: COMPLETED | OUTPATIENT
Start: 2019-06-29 | End: 2019-06-29

## 2019-06-29 RX ORDER — DEXAMETHASONE 0.5 MG/5ML
10 ELIXIR ORAL ONCE
Refills: 0 | Status: COMPLETED | OUTPATIENT
Start: 2019-06-29 | End: 2019-06-29

## 2019-06-29 RX ORDER — SODIUM CHLORIDE 9 MG/ML
1000 INJECTION INTRAMUSCULAR; INTRAVENOUS; SUBCUTANEOUS
Refills: 0 | Status: DISCONTINUED | OUTPATIENT
Start: 2019-06-29 | End: 2019-07-02

## 2019-06-29 RX ORDER — CALCIUM CARBONATE 500(1250)
1 TABLET ORAL DAILY
Refills: 0 | Status: DISCONTINUED | OUTPATIENT
Start: 2019-06-29 | End: 2019-07-02

## 2019-06-29 RX ORDER — LEVETIRACETAM 250 MG/1
500 TABLET, FILM COATED ORAL
Refills: 0 | Status: DISCONTINUED | OUTPATIENT
Start: 2019-06-29 | End: 2019-07-02

## 2019-06-29 RX ADMIN — Medication 10 MILLIGRAM(S): at 18:00

## 2019-06-29 RX ADMIN — Medication 4 MILLIGRAM(S): at 23:49

## 2019-06-29 RX ADMIN — ONDANSETRON 4 MILLIGRAM(S): 8 TABLET, FILM COATED ORAL at 11:14

## 2019-06-29 RX ADMIN — LEVETIRACETAM 420 MILLIGRAM(S): 250 TABLET, FILM COATED ORAL at 13:34

## 2019-06-29 RX ADMIN — Medication 1 TABLET(S): at 18:00

## 2019-06-29 RX ADMIN — SODIUM CHLORIDE 75 MILLILITER(S): 9 INJECTION INTRAMUSCULAR; INTRAVENOUS; SUBCUTANEOUS at 18:02

## 2019-06-29 RX ADMIN — PANTOPRAZOLE SODIUM 40 MILLIGRAM(S): 20 TABLET, DELAYED RELEASE ORAL at 18:00

## 2019-06-29 RX ADMIN — LEVETIRACETAM 500 MILLIGRAM(S): 250 TABLET, FILM COATED ORAL at 18:01

## 2019-06-29 NOTE — CONSULT NOTE ADULT - SUBJECTIVE AND OBJECTIVE BOX
REASON FOR CONSULT: telehospitalist evaluation    Outpatient physicians:    PCP Dr. Moises Rojas	    HPI:     67 y/o woman with recent admission  for R brain mass with vasogenic edema, also f/w RLL lung mass s/p lung biopsy, started on Decadron and discharged home after patient refused rehab placement, returning with dizziness, inability to ambulate. Patient was evaluated by neurosurgery, heme/onc and radiation oncology. Patient was supposed to f/u as outpatient for results of the biopsy for further treatment planning. Patient seen with  at the bedside. Patient left the hospital at 5 pm yesterday. Patient reports severe dizziness, nausea, also increased poor balance. Last night tried to get up with the 's help. Had severe nausea with vomiting 6 times. Called the ambulance and was brought back to the ED. Denies pain. Has been able to drink water. Denies nausea currently. Was receiving an antiemetic medication in the hospital, which controlled the nausea. Dizziness is exacerbated by position change. Denies vision change. Reports L-sided weakness. Also reports decreased sensation in the L foot compared to the right side.    PMH: as above    PSH: R wrist fracture s/p repair    Social Hx: former smoker, social alcohol    Meds:   Calcium carbonate 500 mg daily  Dexamethasone 4 mg 4x/day  Keppra 500 mg BID  Protonix 40 mg daily    Allergies: NKDA      OBJECTIVE    Vital Signs Last 24 Hrs  T(C): 37 (2019 11:59), Max: 37 (2019 17:05)  T(F): 98.6 (2019 11:59), Max: 98.6 (2019 17:05)  HR: 47 (2019 11:59) (47 - 64)  BP: 178/79 (2019 11:59) (156/78 - 178/79)  BP(mean): --  RR: 18 (2019 11:59) (18 - 18)  SpO2: 96% (2019 11:59) (96% - 98%)        PHYSICAL EXAM: Tele-evaluation precludes physical exam.       LABS AND IMAGING DATA:                        14.1   19.03 )-----------( 332      ( 2019 10:27 )             41.6     06-    139  |  102  |  21.0<H>  ----------------------------<  128<H>  3.8   |  24.0  |  0.75    Ca    8.9      2019 10:27    TPro  7.6  /  Alb  3.9  /  TBili  0.4  /  DBili  x   /  AST  30  /  ALT  49<H>  /  AlkPhos  78      Urinalysis Basic - ( 2019 10:25 )    Color: Yellow / Appearance: Clear / S.010 / pH: x  Gluc: x / Ketone: Negative  / Bili: Negative / Urobili: Negative mg/dL   Blood: x / Protein: 15 mg/dL / Nitrite: Negative   Leuk Esterase: Negative / RBC: Negative /HPF / WBC 0-2   Sq Epi: x / Non Sq Epi: Occasional / Bacteria: Negative      < from: Xray Chest 1 View-PORTABLE IMMEDIATE (19 @ 11:31) >  IMPRESSION:   RIGHT lower lobe mass unchanged..    < end of copied text >      < from: CT Head No Cont (19 @ 10:50) >  Cystic lesion with asymmetric peripheral nodular soft tissue area is   again seen involving the right posterior frontal/parietal region.   Involvement of the right splenium of the corpus callosum is again   identified. This lesion measures approximately 4.8 x 3.7 cm and   previously measured approximately 4.8 x 3.5 cm. Surrounding edema is   again seen. Mass effect on the right lateral ventricle is again seen.   This finding is likely compatible underlying neoplastic process such as   GBM, though other primary neoplastic process or either solitary   metastasis must be considered. Clinical correlation and continued close   and follow-up is recommended.    < end of copied text >            ASSESSMENT AND PLAN:     67 y/o woman with recent admission  -  for R brain mass with vasogenic edema, also f/w RLL lung mass s/p lung biopsy, started on Decadron and discharged home after patient refused rehab placement, returning with dizziness, inability to ambulate.    *Brain, lung mass:  - lung biopsy done on  in process - once results available, discuss with heme/onc, neurosurgery, rad onc for treatment planning  - continue Decadron 4 mg 4x/day for vasogenic edema  - cont Keppra 500 mg BID for seizure ppx  - continue Protonix  - neurosurgery called by ED - f/u recs  - PT consult recommending acute rehab    *Dizziness, N/V: likely 2/2 brain mass with vasogenic edema. Also reports this started after starting taking dexamethasone by mouth  - continue dexamethasone 4 mg q6h in IV form for now  - Zofran 8 mg IV q8h PRN N/V  - meclizine 25 mg 4x/day PRN dizziness    *DVT ppx:  - given hemorrhagic brain mass, SCD's for now    Care plan discussed with Dr. Sewell

## 2019-06-29 NOTE — ED ADULT TRIAGE NOTE - CHIEF COMPLAINT QUOTE
Patient BIBA, complains of dizziness, nausea and vomiting since last night, states recently dx with brain tumor on monday and was released from hospital yesterday

## 2019-06-29 NOTE — ED ADULT NURSE NOTE - NSIMPLEMENTINTERV_GEN_ALL_ED
Implemented All Fall Risk Interventions:  [place] to call system. Call bell, personal items and telephone within reach. Instruct patient to call for assistance. Room bathroom lighting operational. Non-slip footwear when patient is off stretcher. Physically safe environment: no spills, clutter or unnecessary equipment. Stretcher in lowest position, wheels locked, appropriate side rails in place. Provide visual cue, wrist band, yellow gown, etc. Monitor gait and stability. Monitor for mental status changes and reorient to person, place, and time. Review medications for side effects contributing to fall risk. Reinforce activity limits and safety measures with patient and family.

## 2019-06-29 NOTE — CHART NOTE - NSCHARTNOTEFT_GEN_A_CORE
Social work note:  received call from MD Garcia. Pt recently d/c'd from [facility] - was recommended to go to acute rehab but chose to go home.  Pt now in agreement to go to acute rehab. This worker informed , Josette BLANCHARD.  Due to the weekend, acute rehabs can be done on monday. Info given to MD Garcia. SW/VALENTÍN is following for monday acute

## 2019-06-29 NOTE — H&P ADULT - HISTORY OF PRESENT ILLNESS
Patient is a 68 year old female with recent admission 6/24 - 6/27 for new right brain mass with vasogenic edema; patient was also found to have a RLL lung mass and underwent a  lung biopsy. Patient was evaluated by neurosurgery, heme/onc and radiation oncology. Patient was supposed to f/u as outpatient for results of the biopsy for further treatment planning. During the hospitalization, patient was started on Decadron and discharged home after patient refused rehab placement. Today, patient returns one day after discharge with complaints of nausea, dizziness, & inability to ambulate.  Patient reports doing well when she initially got home, but thinks the PO decadron triggered her nausea and consequently led to profound dizziness. Last night tried to get up with the 's help. However, patient reports severe nausea and vomited 6 times. In the ED, repeat CT head was done and was essentially unchanged but still with significant edema. Patient seen by neurosurgery, recommended IV decadron and may need cyst fenestration. Currently, patient states nausea has improved. Denies dizziness, blurry vision or headaches. However, reports L-sided weakness and decreased sensation in the L foot compared to the right side. Also, denies chest pain, SOB, fevers, chills, abdominal pain

## 2019-06-29 NOTE — H&P ADULT - ASSESSMENT
Patient is a 68 year old female with recent admission 6/24 - 6/27 for new right brain mass with vasogenic edema; patient was also found to have a RLL lung mass and underwent a  lung biopsy. Patient is a 68 year old female with PMH of Carpal tunnel syndrome, former smoker quit 7-8 years ago, and wrist fracture repair in R hand; recent admission 6/24 - 6/27 for new right brain mass with vasogenic edema; patient was also found to have a RLL lung mass and underwent a  lung biopsy. Patient is a 68 year old female with PMH of Carpal tunnel syndrome, former smoker quit 7-8 years ago, and wrist fracture repair in R hand who was recently admitted and discharged home yesterday after being diagnosed with a new right brain mass and a RLL lung mass. Patient was discharged home after she declined acute rehab and was instructed to follow up as outpatient for the biopsy reports. However, she is now readmitted with intractable nausea, vomiting and dizziness.    1. Nausea/vomiting and Dizziness  -admit to monitored bed  -likely due to underlying brain mass with vasogenic edema  -repeat CT head essentially unchanged  -Decadron 10 mg x 1 stat then 4 mg q6hrs  -Zofran and Meclizine PRN  -Judicious hydration  -Neurosurgery consult appreciated; f/u regards to possible cyst fenestration     2. Lung Mass  -s/p lung biopsy on 6/26  -outpatient follow up with hem/onc as scheduled    3. Leukocytosis  -likely secondary to steroids  -CXR - RLL mass unchanged; no new infiltrate  -UA negative  -check procal  -monitor off abx at this time    DVT ppx - SCDS (no chemical ppx as discussed with neurosurgery)

## 2019-06-29 NOTE — ED PROVIDER NOTE - CARE PLAN
Principal Discharge DX:	Dizziness  Secondary Diagnosis:	Brain tumor  Secondary Diagnosis:	Lung tumor

## 2019-06-29 NOTE — PROVIDER CONTACT NOTE (OTHER) - BACKGROUND
PT Recommendation:  Acute rehabilitation facility. Pt is open to PT Recommendation:  Acute rehabilitation facility. Pt is open to rehab, and would like to discuss further with her spouse.

## 2019-06-29 NOTE — ED PROVIDER NOTE - CLINICAL SUMMARY MEDICAL DECISION MAKING FREE TEXT BOX
PT DISCHARGED YESTERDAY . NEW DX BRAIN AND LUNG TUMOR. HAD LUNG BIOPSY AND IS AWAITING RESULT. WAS REC TO GO TO [facility] BUT DECLINED. FUNCTIONAL DECLINE LAST NIGHT AND RETURNED TODAY WITH C/O INCREASED WEAKNESS AND DIZZINESS. CT BRAIN UNCHANGED. LABS STABLE.   REPEAT PT EVAL REC ACUTE REHAB AND PT REFUSES. DISCUSSED WITH MARIELA RAMOS AND WITH DR PALU TELE HOSPITALIST. PT WILL BE READMITTED UNDER DR MILLS

## 2019-06-29 NOTE — ED ADULT NURSE NOTE - OBJECTIVE STATEMENT
pt AOX4 c/o dizziness, n/v with 4 episodes of vomiting, pt denies any pain at the moment, was dc from hospital yesterday states symptoms happens last night. pt received dexamethasone and protonix this morning, pt placed on cardiac monitor, as per family pt had no prior medical history until Monday when she started feeling imbalanced and weakness to left side, pt was discharge was told she mas to brain and lung.

## 2019-06-29 NOTE — ED PROVIDER NOTE - CHPI ED SYMPTOMS NEG
no blurred vision/no vomiting/no fever/no confusion/no numbness/no loss of consciousness/no nausea/no change in level of consciousness

## 2019-06-29 NOTE — ED PROVIDER NOTE - OBJECTIVE STATEMENT
67 YO FEMALE WITH NEW DX OF BRAIN TUMOR . TUMOR LIKELY FROM LUNG, AS S=IT HAS BEEN DETERMINED THAT PT HAS A LUNG MASS. PT WAS ON A CRUISE AND HAD DIZZINESS. SHE THOUGHT IT WAS FROM THE CRUISE SHIP BUT IT DID NOT GO AWAY. SHE CAME IN FOR EVALUATION AND THE ABOVE PATHOLOGICAL CONDITION WAS DIAGNOSED. PT SENT HOME YESTERDAY. PT HAD SEEN HER PRIOR TO DISCHARGE AND REC SUB ACUTE BUT PT REFUSED.  LAST NIGHT SHE AND HER  STATE PT DECLINED. PT C/O BEING VERY DIZZY AND HAVING DIFFICULTY WALKING EVEN WITH A WALKER.  NO FEVER OR CHILLS NO CP OR SOB. MED HX BRAIN AND LUNG TUMORS.  SOC HX X CIG,

## 2019-06-29 NOTE — ED ADULT NURSE REASSESSMENT NOTE - NS ED NURSE REASSESS COMMENT FT1
pt in no apparent distress, denies any pain, states her nausea is better, plan of care explained to pt pt verbalized understanding.

## 2019-06-30 LAB
ANION GAP SERPL CALC-SCNC: 14 MMOL/L — SIGNIFICANT CHANGE UP (ref 5–17)
BASOPHILS # BLD AUTO: 0.02 K/UL — SIGNIFICANT CHANGE UP (ref 0–0.2)
BASOPHILS NFR BLD AUTO: 0.2 % — SIGNIFICANT CHANGE UP (ref 0–2)
BUN SERPL-MCNC: 24 MG/DL — HIGH (ref 8–20)
CALCIUM SERPL-MCNC: 8.9 MG/DL — SIGNIFICANT CHANGE UP (ref 8.6–10.2)
CHLORIDE SERPL-SCNC: 105 MMOL/L — SIGNIFICANT CHANGE UP (ref 98–107)
CO2 SERPL-SCNC: 21 MMOL/L — LOW (ref 22–29)
CREAT SERPL-MCNC: 0.84 MG/DL — SIGNIFICANT CHANGE UP (ref 0.5–1.3)
EOSINOPHIL # BLD AUTO: 0 K/UL — SIGNIFICANT CHANGE UP (ref 0–0.5)
EOSINOPHIL NFR BLD AUTO: 0 % — SIGNIFICANT CHANGE UP (ref 0–6)
GLUCOSE SERPL-MCNC: 115 MG/DL — SIGNIFICANT CHANGE UP (ref 70–115)
HCT VFR BLD CALC: 41.3 % — SIGNIFICANT CHANGE UP (ref 34.5–45)
HGB BLD-MCNC: 13.4 G/DL — SIGNIFICANT CHANGE UP (ref 11.5–15.5)
IMM GRANULOCYTES NFR BLD AUTO: 1.2 % — SIGNIFICANT CHANGE UP (ref 0–1.5)
LYMPHOCYTES # BLD AUTO: 1.14 K/UL — SIGNIFICANT CHANGE UP (ref 1–3.3)
LYMPHOCYTES # BLD AUTO: 8.8 % — LOW (ref 13–44)
MAGNESIUM SERPL-MCNC: 2.2 MG/DL — SIGNIFICANT CHANGE UP (ref 1.6–2.6)
MCHC RBC-ENTMCNC: 30.4 PG — SIGNIFICANT CHANGE UP (ref 27–34)
MCHC RBC-ENTMCNC: 32.4 GM/DL — SIGNIFICANT CHANGE UP (ref 32–36)
MCV RBC AUTO: 93.7 FL — SIGNIFICANT CHANGE UP (ref 80–100)
MONOCYTES # BLD AUTO: 0.77 K/UL — SIGNIFICANT CHANGE UP (ref 0–0.9)
MONOCYTES NFR BLD AUTO: 5.9 % — SIGNIFICANT CHANGE UP (ref 2–14)
NEUTROPHILS # BLD AUTO: 10.88 K/UL — HIGH (ref 1.8–7.4)
NEUTROPHILS NFR BLD AUTO: 83.9 % — HIGH (ref 43–77)
PHOSPHATE SERPL-MCNC: 4 MG/DL — SIGNIFICANT CHANGE UP (ref 2.4–4.7)
PLATELET # BLD AUTO: 306 K/UL — SIGNIFICANT CHANGE UP (ref 150–400)
POTASSIUM SERPL-MCNC: 4.1 MMOL/L — SIGNIFICANT CHANGE UP (ref 3.5–5.3)
POTASSIUM SERPL-SCNC: 4.1 MMOL/L — SIGNIFICANT CHANGE UP (ref 3.5–5.3)
PROCALCITONIN SERPL-MCNC: 0.15 NG/ML — HIGH (ref 0.02–0.1)
RBC # BLD: 4.41 M/UL — SIGNIFICANT CHANGE UP (ref 3.8–5.2)
RBC # FLD: 13.9 % — SIGNIFICANT CHANGE UP (ref 10.3–14.5)
SODIUM SERPL-SCNC: 140 MMOL/L — SIGNIFICANT CHANGE UP (ref 135–145)
WBC # BLD: 12.96 K/UL — HIGH (ref 3.8–10.5)
WBC # FLD AUTO: 12.96 K/UL — HIGH (ref 3.8–10.5)

## 2019-06-30 PROCEDURE — 99233 SBSQ HOSP IP/OBS HIGH 50: CPT

## 2019-06-30 PROCEDURE — ZZZZZ: CPT

## 2019-06-30 PROCEDURE — 99222 1ST HOSP IP/OBS MODERATE 55: CPT

## 2019-06-30 RX ORDER — PANTOPRAZOLE SODIUM 20 MG/1
40 TABLET, DELAYED RELEASE ORAL
Refills: 0 | Status: DISCONTINUED | OUTPATIENT
Start: 2019-06-30 | End: 2019-07-02

## 2019-06-30 RX ADMIN — Medication 4 MILLIGRAM(S): at 23:03

## 2019-06-30 RX ADMIN — Medication 4 MILLIGRAM(S): at 13:20

## 2019-06-30 RX ADMIN — Medication 4 MILLIGRAM(S): at 05:43

## 2019-06-30 RX ADMIN — PANTOPRAZOLE SODIUM 40 MILLIGRAM(S): 20 TABLET, DELAYED RELEASE ORAL at 07:43

## 2019-06-30 RX ADMIN — Medication 4 MILLIGRAM(S): at 17:43

## 2019-06-30 RX ADMIN — Medication 1 TABLET(S): at 17:43

## 2019-06-30 RX ADMIN — PANTOPRAZOLE SODIUM 40 MILLIGRAM(S): 20 TABLET, DELAYED RELEASE ORAL at 17:43

## 2019-06-30 RX ADMIN — LEVETIRACETAM 500 MILLIGRAM(S): 250 TABLET, FILM COATED ORAL at 05:43

## 2019-06-30 RX ADMIN — LEVETIRACETAM 500 MILLIGRAM(S): 250 TABLET, FILM COATED ORAL at 17:43

## 2019-06-30 NOTE — CHART NOTE - NSCHARTNOTEFT_GEN_A_CORE
PA called by RN to report pt with episodes of nonsustained sinus bradycardia HR in 30s on cardiac monitor with spontaneous increase of HR to 40s. Patient noted with sinus bradycardia since admission, HR 40-50bpm. Pt asymptomatic, no c/o CP, palpitations, SOB. Patient is hemodynamically stable, SBPs 140-170s. O2 sat 96% on room air. Pt is stable at this time, comfortable in bed, in no signs of acute distress. Instructed RN to continue to monitor pt and escalate PRN. PA called by RN to report pt with episodes of nonsustained sinus bradycardia HR 38-39 on cardiac monitor with spontaneous increase of HR to 40s. Patient noted with sinus bradycardia since admission, HR 40-50bpm. Pt asymptomatic, no c/o CP, palpitations, SOB. Patient is hemodynamically stable, SBPs 140-170s. O2 sat 96% on room air. Pt is stable at this time, comfortable in bed, in no signs of acute distress. Instructed RN to continue to monitor pt and escalate PRN.

## 2019-06-30 NOTE — CONSULT NOTE ADULT - SUBJECTIVE AND OBJECTIVE BOX
HPI:  Pt 68yf well known to the Neurosurgery service initial seen last week after pt had return from an [facility] with symptom of disorientation, general weakness and left side. Pt had ct of the brain noted for right sided mass.  Pt underwent ct of chest noted for right side lung mas, Pt underwent lung bx with the assist of IR and path pending.  Pt was discharge on  with steriods to follow up with oncology, and neurosurgery.   Pt states that she went home and had extreme dizziness she was unable to ambulate.  She began having nausea and vomited multi times. Pt also states that she also had intense headaches but, was unable to keep meds down.     PAST MEDICAL & SURGICAL HISTORY:  Brain tumor  Lung tumor  Carpal tunnel syndrome      REVIEW OF SYSTEMS:    CONSTITUTIONAL: No fever, weight loss, or fatigue  EYES: No eye pain, visual disturbances, or discharge  ENMT:  No difficulty hearing, tinnitus, pos vertigo; No sinus or throat pain  NECK: No pain or stiffness  BREASTS: No pain, masses, or nipple discharge  RESPIRATORY: No cough, wheezing, chills or hemoptysis; No shortness of breath  CARDIOVASCULAR: No chest pain, palpitations, dizziness, or leg swelling  GASTROINTESTINAL: No abdominal or epigastric pain. pos nausea, vomiting, or hematemesis; No diarrhea or constipation. No melena or hematochezia.  GENITOURINARY: No dysuria, frequency, hematuria, or incontinence  NEUROLOGICAL: pos headaches, memory loss, loss of strength, numbness, or tremors  SKIN: No itching, burning, rashes, or lesions   LYMPH NODES: No enlarged glands  ENDOCRINE: No heat or cold intolerance; No hair loss  MUSCULOSKELETAL: No joint pain or swelling; No muscle, back, or extremity pain  PSYCHIATRIC: No depression, anxiety, mood swings, or difficulty sleeping  HEME/LYMPH: No easy bruising, or bleeding gums  ALLERY AND IMMUNOLOGIC: No hives or eczema    Allergies  No Known Allergies  Intolerances      MEDICATIONS  (STANDING):  calcium carbonate    500 mG (Tums) Chewable 1 Tablet(s) Chew daily  dexamethasone  Injectable 4 milliGRAM(s) IV Push every 6 hours  levETIRAcetam 500 milliGRAM(s) Oral two times a day  pantoprazole    Tablet 40 milliGRAM(s) Oral before breakfast  sodium chloride 0.9%. 1000 milliLiter(s) (75 mL/Hr) IV Continuous <Continuous>    MEDICATIONS  (PRN):  meclizine 25 milliGRAM(s) Oral four times a day PRN Dizziness  ondansetron Injectable 8 milliGRAM(s) IV Push every 8 hours PRN Nausea and/or Vomiting    SOCIAL HISTORY:  lives with   FAMILY HISTORY:  No pertinent family history in first degree relatives    Vital Signs Last 24 Hrs  T(C): 36.8 (2019 23:44), Max: 37 (2019 11:59)  T(F): 98.2 (2019 23:44), Max: 98.6 (2019 11:59)  HR: 43 (2019 04:00) (41 - 58)  BP: 149/72 (2019 23:44) (149/72 - 178/79)  BP(mean): --  RR: 17 (2019 23:44) (17 - 18)  SpO2: 95% (2019 23:44) (93% - 98%)    PHYSICAL EXAM:  GCS 15  GENERAL: NAD, well-groomed, well-developed  HEAD:  Atraumatic, Normocephalic  EYES: EOMI, PERRLA, conjunctiva and sclera clear  ENMT: No tonsillar erythema, exudates, or enlargement; Moist mucous membranes, Good dentition, No lesions, neg facial   NECK: Supple, No JVD, Normal thyroid  NERVOUS SYSTEM:  Alert & Oriented X3, Good concentration; Motor Strength 5/5 B/L upper and lower extremities; DTRs 2+ intact and symmetric, slight drift on the left.  CHEST/LUNG: Clear fair air entry bilaterally slightly decrease on right wehn compared to left side ; No rales, rhonchi, wheezing, or rubs  HEART: Regular rate and rhythm; No murmurs, rubs, or gallops  ABDOMEN: Soft, Nontender, Nondistended; Bowel sounds present  EXTREMITIES:  2+ Peripheral Pulses, No clubbing, cyanosis, or edema  LYMPH: No lymphadenopathy noted  SKIN: No rashes or lesions    LABS:                        14.1   19.03 )-----------( 332      ( 2019 10:27 )             41.6         139  |  102  |  21.0<H>  ----------------------------<  128<H>  3.8   |  24.0  |  0.75    Ca    8.9      2019 10:27    TPro  7.6  /  Alb  3.9  /  TBili  0.4  /  DBili  x   /  AST  30  /  ALT  49<H>  /  AlkPhos  78  06-    PT/INR - ( 2019 10:27 )   PT: 11.7 sec;   INR: 1.02 ratio         PTT - ( 2019 10:27 )  PTT:23.0 sec  Urinalysis Basic - ( 2019 10:25 )    Color: Yellow / Appearance: Clear / S.010 / pH: x  Gluc: x / Ketone: Negative  / Bili: Negative / Urobili: Negative mg/dL   Blood: x / Protein: 15 mg/dL / Nitrite: Negative   Leuk Esterase: Negative / RBC: Negative /HPF / WBC 0-2   Sq Epi: x / Non Sq Epi: Occasional / Bacteria: Negative        RADIOLOGY & ADDITIONAL STUDIES:  ~~~~~~~~~~~~~~~~~~~~~~~~~~~~~~  < from: CT Head No Cont (19 @ 10:50) >     EXAM:  CT BRAIN                        PROCEDURE DATE:  2019    Impression: No significant change when allowing for differences in   technique.  Cystic lesion with asymmetric peripheral nodular soft tissue area is   again seen involving the right posterior frontal/parietal region.   Involvement of the right splenium of the corpus callosum is again   identified. This lesion measures approximately 4.8 x 3.7 cm and   previously measured approximately 4.8 x 3.5 cm. Surrounding edema is   again seen. Mass effect on the right lateral ventricle is again seen.   This finding is likely compatible underlying neoplastic process such as   GBM, though other primary neoplastic process or either solitary   metastasis must be considered. Clinical correlation and continued close   and follow-up is recommended.    < end of copied text >

## 2019-06-30 NOTE — CONSULT NOTE ADULT - ASSESSMENT
68yf ex smoker, recently dx with brain mass and lung mass.   Sent home decadron  Ct of the brain on 6/29 consistent with prior findings cystic lesion involving the right post frontal parietal region involving the right splenium of the corpus callosum  Pos mass effect pos vasogenic edema.  Plan  1. admit   2. decadron 10mg now then 4mg q6  3. protonix 40 mg q12  4. Pt will be re-evaluated for by Neurosurgery will attempt to assist with mass effect with steriods and d/w attending the poss of cystic fenestration.   5. Pt films discussed with Dr Sheehan and plan to continue steriod and have additional services assist with symptoms.

## 2019-06-30 NOTE — PROGRESS NOTE ADULT - SUBJECTIVE AND OBJECTIVE BOX
NS PA Note  HD#1    Patient seen bedside. C/O slight headache and some lightheadedness. Also states she has some weakness on the left arm and leg, specifically the hand. Also with trace weakness on the right, says when she holds the phone to her ear it drifts away. Has not been OOB. Denies change in vision, n/v.  bedside as well. Patient on dex 4q6 and keppra.  VSS, afebrile  Neuro- AAOx3, NAD  speech clear and appropriate  follows commands  pupils equal and reactive  face symmetric, tongue ML  +L drift, +LUE dysmetric (FTN testing asymmetric)  COOK at least AG, right side 5/5, left hand trace weakness but still close to 5/5 throughout  sensate intact to LT  Plan:  NS stable  Maintain dex at current dose  CW keppra AED  CW PT/OT, OOB as tolerated with assist  SCDs in bed  Dr. Sheehan to see.

## 2019-06-30 NOTE — PROGRESS NOTE ADULT - ASSESSMENT
Patient is a 68 year old female with PMH of Carpal tunnel syndrome, former smoker quit 7-8 years ago, and wrist fracture repair in R hand who was recently admitted and discharged home yesterday after being diagnosed with a new right brain mass and a RLL lung mass. Patient was discharged home after she declined acute rehab and was instructed to follow up as outpatient for the biopsy reports. However, she is now readmitted with intractable nausea, vomiting and dizziness.    1. Nausea/vomiting and Dizziness  -likely due to underlying brain mass with vasogenic edema  -repeat CT head essentially unchanged  -Decadron 10 mg x 1 in the ED  -continue Decadron 4 mg q6hrs  -Zofran and Meclizine PRN  -Judicious hydration x 2 liters to be completed today  -Neurosurgery consult appreciated; f/u regards to possible cyst fenestration     2. Lung Mass  -s/p lung biopsy on 6/26  -outpatient follow up with hem/onc as scheduled    3. Leukocytosis  -likely secondary to steroids, trending downwards  -CXR - RLL mass unchanged; no new infiltrate  -UA negative  -procal minimally elevated  -monitor off abx at this time    DVT ppx - SCDS (no chemical ppx as discussed with neurosurgery)       Attending Statement:  Plan discussed with patient, family at bedside, RN

## 2019-06-30 NOTE — PROGRESS NOTE ADULT - SUBJECTIVE AND OBJECTIVE BOX
CHIEF COMPLAINT/INTERVAL HISTORY:    Patient is a 68y old  Female who presents with a chief complaint of Nausea/vomiting (2019 11:45)    SUBJECTIVE & OBJECTIVE: Pt seen and examined at bedside. No overnight events. Patient reports feeling better today. Nausea improved. Continue Decadron.     ROS: No chest pain, palpitations, SOB, light headedness, dizziness, headache, nausea/vomiting, fevers/chills, abdominal pain, dysuria or increased urinary frequency.    ICU Vital Signs Last 24 Hrs  T(C): 36.7 (2019 16:38), Max: 36.8 (2019 20:45)  T(F): 98 (2019 16:38), Max: 98.2 (2019 20:45)  HR: 53 (2019 16:38) (41 - 56)  BP: 160/72 (2019 16:38) (147/84 - 166/79)  RR: 17 (2019 11:46) (17 - 18)  SpO2: 95% (2019 16:38) (93% - 98%)    MEDICATIONS  (STANDING):  calcium carbonate    500 mG (Tums) Chewable 1 Tablet(s) Chew daily  dexamethasone  Injectable 4 milliGRAM(s) IV Push every 6 hours  levETIRAcetam 500 milliGRAM(s) Oral two times a day  pantoprazole    Tablet 40 milliGRAM(s) Oral two times a day  sodium chloride 0.9%. 1000 milliLiter(s) (75 mL/Hr) IV Continuous <Continuous>    MEDICATIONS  (PRN):  meclizine 25 milliGRAM(s) Oral four times a day PRN Dizziness  ondansetron Injectable 8 milliGRAM(s) IV Push every 8 hours PRN Nausea and/or Vomiting      LABS:                        13.4   12.96 )-----------( 306      ( 2019 09:52 )             41.3     06-30    140  |  105  |  24.0<H>  ----------------------------<  115  4.1   |  21.0<L>  |  0.84    Ca    8.9      2019 09:52  Phos  4.0     06-30  Mg     2.2     06-30    TPro  7.6  /  Alb  3.9  /  TBili  0.4  /  DBili  x   /  AST  30  /  ALT  49<H>  /  AlkPhos  78  06-29    PT/INR - ( 2019 10:27 )   PT: 11.7 sec;   INR: 1.02 ratio         PTT - ( 2019 10:27 )  PTT:23.0 sec  Urinalysis Basic - ( 2019 10:25 )    Color: Yellow / Appearance: Clear / S.010 / pH: x  Gluc: x / Ketone: Negative  / Bili: Negative / Urobili: Negative mg/dL   Blood: x / Protein: 15 mg/dL / Nitrite: Negative   Leuk Esterase: Negative / RBC: Negative /HPF / WBC 0-2   Sq Epi: x / Non Sq Epi: Occasional / Bacteria: Negative      PHYSICAL EXAM:    GENERAL: elderly female, laying in bed, NAD  HEAD:  Atraumatic, Normocephalic  EYES: EOMI, PERRLA, conjunctiva and sclera clear  ENMT: Moist mucous membranes  NECK: Supple   NERVOUS SYSTEM:  Alert & Oriented X3, LUE/LISSETTE   CHEST/LUNG: Clear to auscultation bilaterally; No rales, rhonchi, wheezing, or rubs  HEART: Regular rate and rhythm; + S1/S2  ABDOMEN: Soft, Nontender, Nondistended; Bowel sounds present  EXTREMITIES:  no pedal edema

## 2019-06-30 NOTE — PROVIDER CONTACT NOTE (OTHER) - ASSESSMENT
Patient asymptomatic, VSS, no complaints of SOB or chest pain. Patient A&Ox4, reports hx of bradycardia.
Goals:  Within 2 weeks, pt will be able to perform the following...  bed mobility independently, transfers modified independent with least restrictive device, ambulation x 150 feet with least restrictive device; modified independently.

## 2019-07-01 ENCOUNTER — TRANSCRIPTION ENCOUNTER (OUTPATIENT)
Age: 69
End: 2019-07-01

## 2019-07-01 ENCOUNTER — INBOUND DOCUMENT (OUTPATIENT)
Age: 69
End: 2019-07-01

## 2019-07-01 ENCOUNTER — OUTPATIENT (OUTPATIENT)
Dept: OUTPATIENT SERVICES | Facility: HOSPITAL | Age: 69
LOS: 1 days | Discharge: HOME | End: 2019-07-01

## 2019-07-01 DIAGNOSIS — G56.00 CARPAL TUNNEL SYNDROME, UNSPECIFIED UPPER LIMB: Chronic | ICD-10-CM

## 2019-07-01 PROBLEM — D49.1 NEOPLASM OF UNSPECIFIED BEHAVIOR OF RESPIRATORY SYSTEM: Chronic | Status: ACTIVE | Noted: 2019-06-29

## 2019-07-01 PROBLEM — D49.6 NEOPLASM OF UNSPECIFIED BEHAVIOR OF BRAIN: Chronic | Status: ACTIVE | Noted: 2019-06-29

## 2019-07-01 LAB
ANION GAP SERPL CALC-SCNC: 9 MMOL/L — SIGNIFICANT CHANGE UP (ref 5–17)
ANISOCYTOSIS BLD QL: SLIGHT — SIGNIFICANT CHANGE UP
BASOPHILS # BLD AUTO: 0 K/UL — SIGNIFICANT CHANGE UP (ref 0–0.2)
BASOPHILS NFR BLD AUTO: 0 % — SIGNIFICANT CHANGE UP (ref 0–2)
BLD GP AB SCN SERPL QL: SIGNIFICANT CHANGE UP
BUN SERPL-MCNC: 26 MG/DL — HIGH (ref 8–20)
CALCIUM SERPL-MCNC: 8.8 MG/DL — SIGNIFICANT CHANGE UP (ref 8.6–10.2)
CHLORIDE SERPL-SCNC: 107 MMOL/L — SIGNIFICANT CHANGE UP (ref 98–107)
CO2 SERPL-SCNC: 24 MMOL/L — SIGNIFICANT CHANGE UP (ref 22–29)
CREAT SERPL-MCNC: 0.87 MG/DL — SIGNIFICANT CHANGE UP (ref 0.5–1.3)
ELLIPTOCYTES BLD QL SMEAR: SLIGHT — SIGNIFICANT CHANGE UP
EOSINOPHIL # BLD AUTO: 0 K/UL — SIGNIFICANT CHANGE UP (ref 0–0.5)
EOSINOPHIL NFR BLD AUTO: 0 % — SIGNIFICANT CHANGE UP (ref 0–6)
GIANT PLATELETS BLD QL SMEAR: PRESENT — SIGNIFICANT CHANGE UP
GLUCOSE SERPL-MCNC: 125 MG/DL — HIGH (ref 70–115)
HCT VFR BLD CALC: 42.4 % — SIGNIFICANT CHANGE UP (ref 34.5–45)
HGB BLD-MCNC: 13.8 G/DL — SIGNIFICANT CHANGE UP (ref 11.5–15.5)
HYPOCHROMIA BLD QL: SLIGHT — SIGNIFICANT CHANGE UP
LYMPHOCYTES # BLD AUTO: 1.13 K/UL — SIGNIFICANT CHANGE UP (ref 1–3.3)
LYMPHOCYTES # BLD AUTO: 7 % — LOW (ref 13–44)
MACROCYTES BLD QL: SLIGHT — SIGNIFICANT CHANGE UP
MAGNESIUM SERPL-MCNC: 2.1 MG/DL — SIGNIFICANT CHANGE UP (ref 1.6–2.6)
MANUAL SMEAR VERIFICATION: SIGNIFICANT CHANGE UP
MCHC RBC-ENTMCNC: 31.2 PG — SIGNIFICANT CHANGE UP (ref 27–34)
MCHC RBC-ENTMCNC: 32.5 GM/DL — SIGNIFICANT CHANGE UP (ref 32–36)
MCV RBC AUTO: 95.7 FL — SIGNIFICANT CHANGE UP (ref 80–100)
MICROCYTES BLD QL: SLIGHT — SIGNIFICANT CHANGE UP
MONOCYTES # BLD AUTO: 0.57 K/UL — SIGNIFICANT CHANGE UP (ref 0–0.9)
MONOCYTES NFR BLD AUTO: 3.5 % — SIGNIFICANT CHANGE UP (ref 2–14)
NEUTROPHILS # BLD AUTO: 14.51 K/UL — HIGH (ref 1.8–7.4)
NEUTROPHILS NFR BLD AUTO: 89.5 % — HIGH (ref 43–77)
OVALOCYTES BLD QL SMEAR: SLIGHT — SIGNIFICANT CHANGE UP
PHOSPHATE SERPL-MCNC: 3.9 MG/DL — SIGNIFICANT CHANGE UP (ref 2.4–4.7)
PLAT MORPH BLD: ABNORMAL
PLATELET # BLD AUTO: 303 K/UL — SIGNIFICANT CHANGE UP (ref 150–400)
PLATELET COUNT - ESTIMATE: NORMAL — SIGNIFICANT CHANGE UP
POIKILOCYTOSIS BLD QL AUTO: SLIGHT — SIGNIFICANT CHANGE UP
POTASSIUM SERPL-MCNC: 5 MMOL/L — SIGNIFICANT CHANGE UP (ref 3.5–5.3)
POTASSIUM SERPL-SCNC: 5 MMOL/L — SIGNIFICANT CHANGE UP (ref 3.5–5.3)
RBC # BLD: 4.43 M/UL — SIGNIFICANT CHANGE UP (ref 3.8–5.2)
RBC # FLD: 13.7 % — SIGNIFICANT CHANGE UP (ref 10.3–14.5)
RBC BLD AUTO: ABNORMAL
SODIUM SERPL-SCNC: 140 MMOL/L — SIGNIFICANT CHANGE UP (ref 135–145)
SURGICAL PATHOLOGY STUDY: SIGNIFICANT CHANGE UP
TSH SERPL-MCNC: 0.81 UIU/ML — SIGNIFICANT CHANGE UP (ref 0.27–4.2)
WBC # BLD: 16.21 K/UL — HIGH (ref 3.8–10.5)
WBC # FLD AUTO: 16.21 K/UL — HIGH (ref 3.8–10.5)

## 2019-07-01 PROCEDURE — 99233 SBSQ HOSP IP/OBS HIGH 50: CPT

## 2019-07-01 PROCEDURE — 93010 ELECTROCARDIOGRAM REPORT: CPT

## 2019-07-01 RX ORDER — CEFAZOLIN SODIUM 1 G
2000 VIAL (EA) INJECTION ONCE
Refills: 0 | Status: DISCONTINUED | OUTPATIENT
Start: 2019-07-02 | End: 2019-07-02

## 2019-07-01 RX ORDER — HYDRALAZINE HCL 50 MG
25 TABLET ORAL THREE TIMES A DAY
Refills: 0 | Status: DISCONTINUED | OUTPATIENT
Start: 2019-07-01 | End: 2019-07-02

## 2019-07-01 RX ORDER — SODIUM CHLORIDE 9 MG/ML
1000 INJECTION INTRAMUSCULAR; INTRAVENOUS; SUBCUTANEOUS
Refills: 0 | Status: DISCONTINUED | OUTPATIENT
Start: 2019-07-02 | End: 2019-07-02

## 2019-07-01 RX ORDER — LEVETIRACETAM 250 MG/1
1000 TABLET, FILM COATED ORAL ONCE
Refills: 0 | Status: DISCONTINUED | OUTPATIENT
Start: 2019-07-02 | End: 2019-07-02

## 2019-07-01 RX ADMIN — Medication 4 MILLIGRAM(S): at 11:58

## 2019-07-01 RX ADMIN — PANTOPRAZOLE SODIUM 40 MILLIGRAM(S): 20 TABLET, DELAYED RELEASE ORAL at 05:37

## 2019-07-01 RX ADMIN — LEVETIRACETAM 500 MILLIGRAM(S): 250 TABLET, FILM COATED ORAL at 17:06

## 2019-07-01 RX ADMIN — Medication 4 MILLIGRAM(S): at 23:19

## 2019-07-01 RX ADMIN — Medication 1 TABLET(S): at 11:58

## 2019-07-01 RX ADMIN — Medication 4 MILLIGRAM(S): at 05:37

## 2019-07-01 RX ADMIN — Medication 4 MILLIGRAM(S): at 17:06

## 2019-07-01 RX ADMIN — Medication 25 MILLIGRAM(S): at 10:03

## 2019-07-01 RX ADMIN — PANTOPRAZOLE SODIUM 40 MILLIGRAM(S): 20 TABLET, DELAYED RELEASE ORAL at 17:06

## 2019-07-01 RX ADMIN — Medication 25 MILLIGRAM(S): at 23:20

## 2019-07-01 RX ADMIN — Medication 25 MILLIGRAM(S): at 17:04

## 2019-07-01 RX ADMIN — LEVETIRACETAM 500 MILLIGRAM(S): 250 TABLET, FILM COATED ORAL at 05:37

## 2019-07-01 NOTE — CDI QUERY NOTE - NSCDIOTHERTXTBX_GEN_ALL_CORE_HH
Can you please confirm or acknowledge pathology report?    A.	Right lung Non-small cell carcinoma consistent with adenocarcinoma.  B.	Other, please specify  C.	Not clinically significant      Supporting Documentations:      Surgical Final Report  Final Diagnosis    Right lung, "mass", CT guided core needle biopsy:  -Non-small cell carcinoma consistent with adenocarcinoma.  -See note.    Note: Immunohistochemical stains for CK-7, CK-20, TTF-1, CK5/6  and p63 were performed on block 1-C at [facility] and interpreted  at [facility] as follows:    CK-7, TTF-1: Positive  CK5/6, p63 and CK-20: Negative    Verified by: Kenn Cifuentes M.D.  (Electronic Signature)  Reported on: 07/01/19 09:21 EDT, [address] [place] [address]  _________________________________________________________________    Comment  This case represents a current recent malignant diagnosis. This  patient will be registered in the [facility] Cancer Registry Database in  accordance with [facility] Public Health Law 2401. The cancer registrar  and or [facility] may contact you for clinical follow up.    Case reported to Tumor Registry.    Clinical History  Patient found to have a right lung mass and a brain mass--dizzy,  gait problems, smoker-quite 7 years ago, CT guided Right lung  mass 5 cm    Specimen(s) Submitted  1     Right lung mass biopsy- cystic necrotic hemorrhagic mass    Gross Description  The specimen is received in formalin, labeled with the patients  name, medical record number and designated: Right lung. It  consists of three white cores ranging from 0.5-1.0 cm in greatest  dimension.  Entirely submitted in three cassettes  An immediate  assessment is performed and two touch preps are prepared    IMMEDIATE ASSESSMENT:  -Adequate    By: Dr Esau ELLISON 06/26/2019 11:17

## 2019-07-01 NOTE — PROGRESS NOTE ADULT - SUBJECTIVE AND OBJECTIVE BOX
NSGY Attg:    Patient seen and examined.  No acute overnight events.    Exam:  A and O x 3  PERRL  EOMI  FS grossly  COOK     CT reviewed.    I discussed the risks, benefits, and alternatives of stereotactic cyst aspiration to the patient and her family as below:    benefit: hopeful relief of mass effect   alternative: no surgical intervention; radiation alone  risks: bleeding, infection, CSF leak, failure of procedure/cyst recurrence, need for re-operation, seizure, stroke, coma, death, DVT, PE, MI, PNA, UTI, difficulty/failure to intubate or extubate, new or worsening numbness, tingling, weakness, paralysis, sensory changes (visual field cut or loss), difficulty/inability to ambulate, difficulty with expressive or receptive speech, altered personality or judgment, sexual dysfunction, incontinence, neglect  The patient and her family verbalize their understanding of the above.  I have answered all their questions.  The wish to proceed with surgical intervention.    A/P:  - pre-op for stereotactic cyst aspiration tomorrow

## 2019-07-01 NOTE — PROGRESS NOTE ADULT - ASSESSMENT
Patient is a 68 year old female with PMH of Carpal tunnel syndrome, former smoker quit 7-8 years ago, and wrist fracture repair in R hand who was recently admitted and discharged home yesterday after being diagnosed with a new right brain mass and a RLL lung mass. Patient was discharged home after she declined acute rehab and was instructed to follow up as outpatient for the biopsy reports. However, she is now readmitted with intractable nausea, vomiting and dizziness.    1. Nausea/vomiting and Dizziness - improved  -likely due to underlying brain mass with vasogenic edema  -repeat CT head essentially unchanged  -Decadron 10 mg x 1 in the ED  -continue Decadron 4 mg q6hrs  -Zofran and Meclizine PRN  -Judicious hydration x 2 liters   -Neurosurgery consult appreciated; tentative plans for cyst aspiration tomorrow    2. Lung Mass  -s/p lung biopsy on 6/26  -outpatient follow up with hem/onc as scheduled    3. Leukocytosis  -likely secondary to steroids   -CXR - RLL mass unchanged; no new infiltrate  -UA negative  -procal minimally elevated  -monitor off abx at this time    DVT ppx - SCDS (no chemical ppx as discussed with neurosurgery)       Attending Statement:  Plan discussed with patient, RN Patient is a 68 year old female with PMH of Carpal tunnel syndrome, former smoker quit 7-8 years ago, and wrist fracture repair in R hand who was recently admitted and discharged home yesterday after being diagnosed with a new right brain mass and a RLL lung mass. Patient was discharged home after she declined acute rehab and was instructed to follow up as outpatient for the biopsy reports. However, she is now readmitted with intractable nausea, vomiting and dizziness.    1. Nausea/vomiting and Dizziness - improved  -likely due to underlying brain mass with vasogenic edema  -repeat CT head essentially unchanged  -Decadron 10 mg x 1 in the ED  -continue Decadron 4 mg q6hrs  -Zofran and Meclizine PRN  -Judicious hydration x 2 liters   -Neurosurgery consult appreciated; tentative plans for cyst aspiration tomorrow    2. Lung Mass  -s/p lung biopsy on 6/26  -outpatient follow up with hem/onc as scheduled    3. Leukocytosis  -likely secondary to steroids   -CXR - RLL mass unchanged; no new infiltrate  -UA negative  -procal minimally elevated  -monitor off abx at this time    4. Sinus Bradycardia  -asymptomatic  -EKG without evidence of heart block  -avoid AVN blockers  -TSH WNL  -monitor HR closely    DVT ppx - SCDS (no chemical ppx as discussed with neurosurgery)       Attending Statement:  Plan discussed with patient, RN    Revised Cardiac Risk Index for Pre-Operative Clearance is 0 points; may proceed for planned procedure. Patient is medically optimized for cyst aspiration.

## 2019-07-01 NOTE — PROGRESS NOTE ADULT - SUBJECTIVE AND OBJECTIVE BOX
CHIEF COMPLAINT/INTERVAL HISTORY:    Patient is a 68y old  Female who presents with a chief complaint of Nausea/vomiting (01 Jul 2019 16:34)    SUBJECTIVE & OBJECTIVE: Pt seen and examined at bedside. No overnight events. Nausea overall improved, however patient anxious regarding cyst aspiration tomorrow.    ROS: No chest pain, palpitations, SOB, light headedness, dizziness, headache, nausea/vomiting, fevers/chills, abdominal pain, dysuria or increased urinary frequency.    ICU Vital Signs Last 24 Hrs  T(C): 36.6 (01 Jul 2019 16:44), Max: 36.7 (30 Jun 2019 22:44)  T(F): 97.8 (01 Jul 2019 16:44), Max: 98 (30 Jun 2019 22:44)  HR: 53 (01 Jul 2019 16:44) (42 - 53)  BP: 151/78 (01 Jul 2019 16:44) (151/78 - 174/80)  RR: 20 (01 Jul 2019 16:44) (18 - 20)  SpO2: 96% (01 Jul 2019 16:44) (95% - 96%)    MEDICATIONS  (STANDING):  calcium carbonate    500 mG (Tums) Chewable 1 Tablet(s) Chew daily  dexamethasone  Injectable 4 milliGRAM(s) IV Push every 6 hours  hydrALAZINE 25 milliGRAM(s) Oral three times a day  levETIRAcetam 500 milliGRAM(s) Oral two times a day  pantoprazole    Tablet 40 milliGRAM(s) Oral two times a day  sodium chloride 0.9%. 1000 milliLiter(s) (75 mL/Hr) IV Continuous <Continuous>    MEDICATIONS  (PRN):  meclizine 25 milliGRAM(s) Oral four times a day PRN Dizziness  ondansetron Injectable 8 milliGRAM(s) IV Push every 8 hours PRN Nausea and/or Vomiting      LABS:                        13.8   16.21 )-----------( 303      ( 01 Jul 2019 07:44 )             42.4     07-01    140  |  107  |  26.0<H>  ----------------------------<  125<H>  5.0   |  24.0  |  0.87    Ca    8.8      01 Jul 2019 06:36  Phos  3.9     07-01  Mg     2.1     07-01      PHYSICAL EXAM:    GENERAL: elderly female, laying in bed, NAD  HEAD:  Atraumatic, Normocephalic  EYES: EOMI, PERRLA, conjunctiva and sclera clear  ENMT: Moist mucous membranes  NECK: Supple   NERVOUS SYSTEM:  Alert & Oriented X3, LUE/LISSETTE   CHEST/LUNG: Clear to auscultation bilaterally; No rales, rhonchi, wheezing, or rubs  HEART: Regular rate and rhythm; + S1/S2  ABDOMEN: Soft, Nontender, Nondistended; Bowel sounds present  EXTREMITIES:  no pedal edema

## 2019-07-02 ENCOUNTER — APPOINTMENT (OUTPATIENT)
Dept: NEUROSURGERY | Facility: HOSPITAL | Age: 69
End: 2019-07-02
Payer: MEDICARE

## 2019-07-02 LAB
ANION GAP SERPL CALC-SCNC: 10 MMOL/L — SIGNIFICANT CHANGE UP (ref 5–17)
BASOPHILS # BLD AUTO: 0.06 K/UL — SIGNIFICANT CHANGE UP (ref 0–0.2)
BASOPHILS NFR BLD AUTO: 0.3 % — SIGNIFICANT CHANGE UP (ref 0–2)
BLD GP AB SCN SERPL QL: SIGNIFICANT CHANGE UP
BUN SERPL-MCNC: 28 MG/DL — HIGH (ref 8–20)
CALCIUM SERPL-MCNC: 8.8 MG/DL — SIGNIFICANT CHANGE UP (ref 8.6–10.2)
CHLORIDE SERPL-SCNC: 105 MMOL/L — SIGNIFICANT CHANGE UP (ref 98–107)
CO2 SERPL-SCNC: 26 MMOL/L — SIGNIFICANT CHANGE UP (ref 22–29)
CREAT SERPL-MCNC: 0.79 MG/DL — SIGNIFICANT CHANGE UP (ref 0.5–1.3)
EOSINOPHIL # BLD AUTO: 0 K/UL — SIGNIFICANT CHANGE UP (ref 0–0.5)
EOSINOPHIL NFR BLD AUTO: 0 % — SIGNIFICANT CHANGE UP (ref 0–6)
GLUCOSE BLDC GLUCOMTR-MCNC: 128 MG/DL — HIGH (ref 70–99)
GLUCOSE SERPL-MCNC: 111 MG/DL — SIGNIFICANT CHANGE UP (ref 70–115)
HCT VFR BLD CALC: 44.4 % — SIGNIFICANT CHANGE UP (ref 34.5–45)
HGB BLD-MCNC: 14.5 G/DL — SIGNIFICANT CHANGE UP (ref 11.5–15.5)
IMM GRANULOCYTES NFR BLD AUTO: 1.8 % — HIGH (ref 0–1.5)
LYMPHOCYTES # BLD AUTO: 1.42 K/UL — SIGNIFICANT CHANGE UP (ref 1–3.3)
LYMPHOCYTES # BLD AUTO: 6.9 % — LOW (ref 13–44)
MAGNESIUM SERPL-MCNC: 2.2 MG/DL — SIGNIFICANT CHANGE UP (ref 1.6–2.6)
MCHC RBC-ENTMCNC: 31.1 PG — SIGNIFICANT CHANGE UP (ref 27–34)
MCHC RBC-ENTMCNC: 32.7 GM/DL — SIGNIFICANT CHANGE UP (ref 32–36)
MCV RBC AUTO: 95.3 FL — SIGNIFICANT CHANGE UP (ref 80–100)
MONOCYTES # BLD AUTO: 1.46 K/UL — HIGH (ref 0–0.9)
MONOCYTES NFR BLD AUTO: 7.1 % — SIGNIFICANT CHANGE UP (ref 2–14)
NEUTROPHILS # BLD AUTO: 17.25 K/UL — HIGH (ref 1.8–7.4)
NEUTROPHILS NFR BLD AUTO: 83.9 % — HIGH (ref 43–77)
PHOSPHATE SERPL-MCNC: 3.4 MG/DL — SIGNIFICANT CHANGE UP (ref 2.4–4.7)
PLATELET # BLD AUTO: 307 K/UL — SIGNIFICANT CHANGE UP (ref 150–400)
POTASSIUM SERPL-MCNC: 4.8 MMOL/L — SIGNIFICANT CHANGE UP (ref 3.5–5.3)
POTASSIUM SERPL-SCNC: 4.8 MMOL/L — SIGNIFICANT CHANGE UP (ref 3.5–5.3)
RBC # BLD: 4.66 M/UL — SIGNIFICANT CHANGE UP (ref 3.8–5.2)
RBC # FLD: 13.7 % — SIGNIFICANT CHANGE UP (ref 10.3–14.5)
SODIUM SERPL-SCNC: 141 MMOL/L — SIGNIFICANT CHANGE UP (ref 135–145)
TYPE + AB SCN PNL BLD: SIGNIFICANT CHANGE UP
WBC # BLD: 20.55 K/UL — HIGH (ref 3.8–10.5)
WBC # FLD AUTO: 20.55 K/UL — HIGH (ref 3.8–10.5)

## 2019-07-02 PROCEDURE — 70450 CT HEAD/BRAIN W/O DYE: CPT | Mod: 26

## 2019-07-02 PROCEDURE — 61781 SCAN PROC CRANIAL INTRA: CPT

## 2019-07-02 PROCEDURE — 61150 BUR HOL/TRPH DRG BRN ABS/CST: CPT

## 2019-07-02 PROCEDURE — 70552 MRI BRAIN STEM W/DYE: CPT | Mod: 26

## 2019-07-02 PROCEDURE — 93010 ELECTROCARDIOGRAM REPORT: CPT

## 2019-07-02 PROCEDURE — 99221 1ST HOSP IP/OBS SF/LOW 40: CPT

## 2019-07-02 RX ORDER — ACETAMINOPHEN 500 MG
1000 TABLET ORAL ONCE
Refills: 0 | Status: COMPLETED | OUTPATIENT
Start: 2019-07-02 | End: 2019-07-02

## 2019-07-02 RX ORDER — ONDANSETRON 8 MG/1
4 TABLET, FILM COATED ORAL ONCE
Refills: 0 | Status: DISCONTINUED | OUTPATIENT
Start: 2019-07-02 | End: 2019-07-05

## 2019-07-02 RX ORDER — DEXAMETHASONE 0.5 MG/5ML
4 ELIXIR ORAL EVERY 6 HOURS
Refills: 0 | Status: DISCONTINUED | OUTPATIENT
Start: 2019-07-02 | End: 2019-07-05

## 2019-07-02 RX ORDER — FENTANYL CITRATE 50 UG/ML
25 INJECTION INTRAVENOUS
Refills: 0 | Status: DISCONTINUED | OUTPATIENT
Start: 2019-07-02 | End: 2019-07-02

## 2019-07-02 RX ORDER — ONDANSETRON 8 MG/1
4 TABLET, FILM COATED ORAL ONCE
Refills: 0 | Status: DISCONTINUED | OUTPATIENT
Start: 2019-07-02 | End: 2019-07-02

## 2019-07-02 RX ORDER — PANTOPRAZOLE SODIUM 20 MG/1
40 TABLET, DELAYED RELEASE ORAL
Refills: 0 | Status: DISCONTINUED | OUTPATIENT
Start: 2019-07-02 | End: 2019-07-05

## 2019-07-02 RX ORDER — MECLIZINE HCL 12.5 MG
25 TABLET ORAL
Refills: 0 | Status: DISCONTINUED | OUTPATIENT
Start: 2019-07-02 | End: 2019-07-05

## 2019-07-02 RX ORDER — SODIUM CHLORIDE 9 MG/ML
1000 INJECTION INTRAMUSCULAR; INTRAVENOUS; SUBCUTANEOUS
Refills: 0 | Status: DISCONTINUED | OUTPATIENT
Start: 2019-07-02 | End: 2019-07-03

## 2019-07-02 RX ORDER — LEVETIRACETAM 250 MG/1
500 TABLET, FILM COATED ORAL
Refills: 0 | Status: DISCONTINUED | OUTPATIENT
Start: 2019-07-02 | End: 2019-07-02

## 2019-07-02 RX ORDER — LEVETIRACETAM 250 MG/1
500 TABLET, FILM COATED ORAL
Refills: 0 | Status: DISCONTINUED | OUTPATIENT
Start: 2019-07-02 | End: 2019-07-05

## 2019-07-02 RX ORDER — HYDRALAZINE HCL 50 MG
25 TABLET ORAL THREE TIMES A DAY
Refills: 0 | Status: DISCONTINUED | OUTPATIENT
Start: 2019-07-02 | End: 2019-07-02

## 2019-07-02 RX ORDER — SODIUM CHLORIDE 9 MG/ML
1000 INJECTION, SOLUTION INTRAVENOUS
Refills: 0 | Status: DISCONTINUED | OUTPATIENT
Start: 2019-07-02 | End: 2019-07-02

## 2019-07-02 RX ADMIN — Medication 4 MILLIGRAM(S): at 06:33

## 2019-07-02 RX ADMIN — LEVETIRACETAM 500 MILLIGRAM(S): 250 TABLET, FILM COATED ORAL at 18:09

## 2019-07-02 RX ADMIN — Medication 25 MILLIGRAM(S): at 06:32

## 2019-07-02 RX ADMIN — LEVETIRACETAM 500 MILLIGRAM(S): 250 TABLET, FILM COATED ORAL at 06:32

## 2019-07-02 RX ADMIN — Medication 400 MILLIGRAM(S): at 20:55

## 2019-07-02 RX ADMIN — PANTOPRAZOLE SODIUM 40 MILLIGRAM(S): 20 TABLET, DELAYED RELEASE ORAL at 06:32

## 2019-07-02 RX ADMIN — Medication 1000 MILLIGRAM(S): at 21:10

## 2019-07-02 RX ADMIN — Medication 4 MILLIGRAM(S): at 17:18

## 2019-07-02 RX ADMIN — SODIUM CHLORIDE 100 MILLILITER(S): 9 INJECTION INTRAMUSCULAR; INTRAVENOUS; SUBCUTANEOUS at 17:18

## 2019-07-02 NOTE — PROGRESS NOTE ADULT - SUBJECTIVE AND OBJECTIVE BOX
NEUROSURGERY POST OP  NOTE:    Pt s/p STEREOTATIC ASPIRATION OF CYSTIC BRAIN MASS    pt c/o  -headache, admits to local surgical site pain   - Nausea / - Vomiting   denies weakness  admits slight  numbness left side that she feels may be improved from pre op   denies visual changes  denies C/T/LS  Spine pain  +  diet clears  +  bruno cath to gravity til am  + venodynes b/l    - SLIM HMV EVD drains   - a-line/ TLC      MEDICATIONS  (STANDING):  dexamethasone  Injectable 4 milliGRAM(s) IV Push every 6 hours  levETIRAcetam 500 milliGRAM(s) Oral two times a day  pantoprazole    Tablet 40 milliGRAM(s) Oral before breakfast  sodium chloride 0.9%. 1000 milliLiter(s) (100 mL/Hr) IV Continuous <Continuous>    MEDICATIONS  (PRN):  meclizine 25 milliGRAM(s) Oral four times a day PRN Dizziness  ondansetron Injectable 4 milliGRAM(s) IV Push once PRN Nausea and/or Vomiting      Allergies:  Known Allergies            Vital Signs Last 24 Hrs  T(C): 36.7 (02 Jul 2019 20:00), Max: 36.9 (02 Jul 2019 08:27)  T(F): 98 (02 Jul 2019 20:00), Max: 98.5 (02 Jul 2019 08:27)  HR: 38 (02 Jul 2019 23:00) (38 - 81)  BP: 130/62 (02 Jul 2019 23:00) (119/63 - 177/76)  BP(mean): 89 (02 Jul 2019 23:00) (83 - 102)  RR: 16 (02 Jul 2019 23:00) (11 - 32)  SpO2: 98% (02 Jul 2019 23:00) (96% - 100%)    PHYSICAL EXAM:  GENERAL: NAD, well-groomed, well-developed  HEAD:  right high parietal cranial dressing dry and intact.  NECK: Supple, No JVD  NERVOUS SYSTEM:  Alert & Oriented X3, Good concentration;   gross visual acuity and fields intact,  cranial nerves 2-12 intact.  Motor Strength 5/5 B/L upper and lower extremities;   sensory slightly diminished on left upper and lower extrem.  fine motor and coordination intact,   CHEST/LUNG: Clear bilaterally; No rales, rhonchi, wheezing, or rubs  HEART: Regular rate  ABDOMEN: Soft, Nontender, Nondistended; Bowel sounds present  EXTREMITIES:  2+ Peripheral Pulses, No clubbing, cyanosis, or edema  LYMPH: No lymphadenopathy noted  SKIN: No rashes or lesions      LABS:                        14.5   20.55 )-----------( 307      ( 02 Jul 2019 07:20 )             44.4     07-02    141  |  105  |  28.0<H>  ----------------------------<  111  4.8   |  26.0  |  0.79    Ca    8.8      02 Jul 2019 07:20  Phos  3.4     07-02  Mg     2.2     07-02            RADIOLOGY & ADDITIONAL TESTS: CT HEAD POST OP COMPLETE, PENDING READ.       IMP: POST OP INTACT ON EXAM, PATIENT FEELING IMPROVED SENSORY LEFT SIDE AND IMPROVED DYSMETRIA  PLAN: HOB UP 35 DEGREES  CT HEAD POST OP DONE  CONTINUE KEPPRA AND DECADRON [id number], PROTONIX, VenodyneS  D/C BRUNO AM  DIET IN AM IF BS+  PT EVAL NEUROSURGERY POST OP  NOTE:    Pt s/p STEREOTATIC ASPIRATION OF CYSTIC BRAIN MASS    pt c/o  -headache, admits to local surgical site pain   - Nausea / - Vomiting   denies weakness  admits slight  numbness left side that she feels may be improved from pre op   denies visual changes  denies C/T/LS  Spine pain  +  diet clears  +  bruno cath to gravity til am  + venodynes b/l    - SLIM HMV EVD drains   - a-line/ TLC      MEDICATIONS  (STANDING):  dexamethasone  Injectable 4 milliGRAM(s) IV Push every 6 hours  levETIRAcetam 500 milliGRAM(s) Oral two times a day  pantoprazole    Tablet 40 milliGRAM(s) Oral before breakfast  sodium chloride 0.9%. 1000 milliLiter(s) (100 mL/Hr) IV Continuous <Continuous>    MEDICATIONS  (PRN):  meclizine 25 milliGRAM(s) Oral four times a day PRN Dizziness  ondansetron Injectable 4 milliGRAM(s) IV Push once PRN Nausea and/or Vomiting      Allergies:  Known Allergies            Vital Signs Last 24 Hrs  T(C): 36.7 (02 Jul 2019 20:00), Max: 36.9 (02 Jul 2019 08:27)  T(F): 98 (02 Jul 2019 20:00), Max: 98.5 (02 Jul 2019 08:27)  HR: 38 (02 Jul 2019 23:00) (38 - 81)  BP: 130/62 (02 Jul 2019 23:00) (119/63 - 177/76)  BP(mean): 89 (02 Jul 2019 23:00) (83 - 102)  RR: 16 (02 Jul 2019 23:00) (11 - 32)  SpO2: 98% (02 Jul 2019 23:00) (96% - 100%)    PHYSICAL EXAM:  GENERAL: NAD, well-groomed, well-developed  HEAD:  right high parietal cranial dressing dry and intact.  NECK: Supple, No JVD  NERVOUS SYSTEM:  Alert & Oriented X3, Good concentration;   gross visual acuity and fields intact,  cranial nerves 2-12 intact.  Motor Strength 5/5 B/L upper and lower extremities;   sensory slightly diminished on left upper and lower extrem.  fine motor and coordination intact,   CHEST/LUNG: Clear bilaterally; No rales, rhonchi, wheezing, or rubs  HEART: Regular rate  ABDOMEN: Soft, Nontender, Nondistended; Bowel sounds present  EXTREMITIES:  2+ Peripheral Pulses, No clubbing, cyanosis, or edema  LYMPH: No lymphadenopathy noted  SKIN: No rashes or lesions      LABS:                        14.5   20.55 )-----------( 307      ( 02 Jul 2019 07:20 )             44.4     07-02    141  |  105  |  28.0<H>  ----------------------------<  111  4.8   |  26.0  |  0.79    Ca    8.8      02 Jul 2019 07:20  Phos  3.4     07-02  Mg     2.2     07-02            RADIOLOGY & ADDITIONAL TESTS: CT HEAD POST OP:  Brain: 4 x 3 cm mass lesion is seen in right medial frontoparietal lobe   crossing the midline with a moderate amount of vasogenic edema in right   parietal lobe. No evidence of herniation or midline shift.   Ventricles: Normal. No ventriculomegaly.   Bones/joints: Right parietal paula hole with changes due to recent biopsy.   Small amount of postsurgical pneumocephalus seen.   Sinuses: Visualized sinuses are unremarkable. No fluid levels.   Mastoid air cells: Visualized mastoid air cells are well aerated. No   mastoid   effusion.   Soft tissues: Unremarkable.   Other findings: No evidence of hemorrhage.     IMPRESSION:   Mass lesion is seen with surgical changes or recent biopsy. No post   procedure   complication seen.         IMP: POST OP INTACT ON EXAM, PATIENT FEELING IMPROVED SENSORY LEFT SIDE AND IMPROVED DYSMETRIA  PLAN: HOB UP 35 DEGREES  CT HEAD POST OP WITHOUT ANY COMPLICATION  CONTINUE KEPPRA AND DECADRON [id number], PROTONIX, VenodyneS  D/C BRUNO AM  DIET IN AM IF BS+  PT EVAL

## 2019-07-02 NOTE — CHART NOTE - NSCHARTNOTEFT_GEN_A_CORE
Patient transferred to SICU post OR. Patient to resume management as per the SICU team.    Discussed with Neurosurgery DEX Gallegos.

## 2019-07-02 NOTE — BRIEF OPERATIVE NOTE - NSICDXBRIEFPROCEDURE_GEN_ALL_CORE_FT
PROCEDURES:  Aspiration of cyst of brain with computed tomography guidance 02-Jul-2019 14:36:05  Bert Talbot

## 2019-07-02 NOTE — CONSULT NOTE ADULT - SUBJECTIVE AND OBJECTIVE BOX
SICU Consult Note    HPI:   68yFemale w/ recently diagnosed lung adenocarcinoma found to have possible cyst vs metastases to brain admitted to SICU 07-02-19 s/p elective drainage and bx of intracranial cyst. Patient was initially diagnosed w/ brain mass after presenting w/ sensory deficits and dizziness. Given recent diagnosis of right lung adenocarcinoma, pt believed to have metastatic disease. Initial plan was for patient to have outpatient follow up but given symptoms and extent of vasogenic edema, decision made to take patient for drainage of cyst. Due to her edema, patient has been on dexamethasone and keppra.      PMH:  Brain tumor [Active]  Lung tumor [Active]    PSH:  Carpal tunnel release    Home Medications:  3 in 1 Commode :   calcium (as carbonate) 500 mg oral tablet: 1 milligram(s) orally once a day  dexamethasone 4 mg oral tablet: 1 tab(s) orally 4 times a day (with meals and at bedtime)   levETIRAcetam 500 mg oral tablet: 1 tab(s) orally 2 times a day  pantoprazole 40 mg oral delayed release tablet: 1 tab(s) orally once a day (before a meal)  Rolling walker :     ALL: NKDA    FMH:  Denies family history of gastrointestinal malignancy       SOC Hx:   Denies etoh, tobacco, or drug use     Physical Exam:   Gen: well appearing female, NAD  Neuro: AOx3, EOMI, PERRLA, no gross deficit on exam. Puncture site on forehead w/ minimal oozing  HEENT: No oral/mucosal lesions, no neck masses or lymphadenopathy  RESP: CTABL, nonlabored breathing  CVS: RRR,   GI: abd soft, NT, ND, no rebound/guarding  Extremities: 2+ peripheral pulses

## 2019-07-02 NOTE — PROGRESS NOTE ADULT - SUBJECTIVE AND OBJECTIVE BOX
NSGY Attg:    Patient seen and examined.  No acute overnight events.    Exam:  A and O x 3  PERRL  EOMI  FS TML  COOK to command  no focal motor deficit    I have answered all of the family's questions.  The patient wishes to proceed with surgical intervention.    A/P:  - to OR for right stereotactic cyst aspiration, possible right craniotomy for cyst fenestration/resection

## 2019-07-02 NOTE — CONSULT NOTE ADULT - ASSESSMENT
69yo female s/p drainage of intracranial cyst/mass by neurosurgery admitted to SICU for frequent neurological checks and hemodynamic monitoring.     Neuro: S/p drainage of cyst/mass. Likely metastatic dz  - Repeat CT scan 6hrs postop  - Hold DVT ppx  - Continue steroids  - q1hr neurochecks    Pulm:   -     CVS:   - keep SBP < 140  - Continue left radial cj for hemodynamic monitoring.     FEN/GI:   - zofran prn for nausea  - keep SBP < 140  - Diet: Restart diet after ct scan    ;   - Likely d/c bruno 7/3 am    Heme/ID  - am cbc  - dvt ppx on hold due to brain mass    Skin:   - wound care for forehead access site per neurosurgery.

## 2019-07-03 LAB
ANION GAP SERPL CALC-SCNC: 11 MMOL/L — SIGNIFICANT CHANGE UP (ref 5–17)
BASOPHILS # BLD AUTO: 0.02 K/UL — SIGNIFICANT CHANGE UP (ref 0–0.2)
BASOPHILS NFR BLD AUTO: 0.1 % — SIGNIFICANT CHANGE UP (ref 0–2)
BUN SERPL-MCNC: 22 MG/DL — HIGH (ref 8–20)
CALCIUM SERPL-MCNC: 7.9 MG/DL — LOW (ref 8.6–10.2)
CHLORIDE SERPL-SCNC: 108 MMOL/L — HIGH (ref 98–107)
CO2 SERPL-SCNC: 20 MMOL/L — LOW (ref 22–29)
CREAT SERPL-MCNC: 0.61 MG/DL — SIGNIFICANT CHANGE UP (ref 0.5–1.3)
EOSINOPHIL # BLD AUTO: 0 K/UL — SIGNIFICANT CHANGE UP (ref 0–0.5)
EOSINOPHIL NFR BLD AUTO: 0 % — SIGNIFICANT CHANGE UP (ref 0–6)
GLUCOSE BLDC GLUCOMTR-MCNC: 101 MG/DL — HIGH (ref 70–99)
GLUCOSE BLDC GLUCOMTR-MCNC: 128 MG/DL — HIGH (ref 70–99)
GLUCOSE BLDC GLUCOMTR-MCNC: 143 MG/DL — HIGH (ref 70–99)
GLUCOSE BLDC GLUCOMTR-MCNC: 184 MG/DL — HIGH (ref 70–99)
GLUCOSE BLDC GLUCOMTR-MCNC: 94 MG/DL — SIGNIFICANT CHANGE UP (ref 70–99)
GLUCOSE SERPL-MCNC: 114 MG/DL — SIGNIFICANT CHANGE UP (ref 70–115)
HCT VFR BLD CALC: 39.2 % — SIGNIFICANT CHANGE UP (ref 34.5–45)
HGB BLD-MCNC: 13 G/DL — SIGNIFICANT CHANGE UP (ref 11.5–15.5)
IMM GRANULOCYTES NFR BLD AUTO: 1 % — SIGNIFICANT CHANGE UP (ref 0–1.5)
LYMPHOCYTES # BLD AUTO: 1 K/UL — SIGNIFICANT CHANGE UP (ref 1–3.3)
LYMPHOCYTES # BLD AUTO: 5.8 % — LOW (ref 13–44)
MAGNESIUM SERPL-MCNC: 2 MG/DL — SIGNIFICANT CHANGE UP (ref 1.8–2.6)
MCHC RBC-ENTMCNC: 30.7 PG — SIGNIFICANT CHANGE UP (ref 27–34)
MCHC RBC-ENTMCNC: 33.2 GM/DL — SIGNIFICANT CHANGE UP (ref 32–36)
MCV RBC AUTO: 92.7 FL — SIGNIFICANT CHANGE UP (ref 80–100)
MONOCYTES # BLD AUTO: 0.98 K/UL — HIGH (ref 0–0.9)
MONOCYTES NFR BLD AUTO: 5.7 % — SIGNIFICANT CHANGE UP (ref 2–14)
NEUTROPHILS # BLD AUTO: 15.03 K/UL — HIGH (ref 1.8–7.4)
NEUTROPHILS NFR BLD AUTO: 87.4 % — HIGH (ref 43–77)
PHOSPHATE SERPL-MCNC: 3.8 MG/DL — SIGNIFICANT CHANGE UP (ref 2.4–4.7)
PLATELET # BLD AUTO: 262 K/UL — SIGNIFICANT CHANGE UP (ref 150–400)
POTASSIUM SERPL-MCNC: 4 MMOL/L — SIGNIFICANT CHANGE UP (ref 3.5–5.3)
POTASSIUM SERPL-SCNC: 4 MMOL/L — SIGNIFICANT CHANGE UP (ref 3.5–5.3)
RBC # BLD: 4.23 M/UL — SIGNIFICANT CHANGE UP (ref 3.8–5.2)
RBC # FLD: 13.8 % — SIGNIFICANT CHANGE UP (ref 10.3–14.5)
SODIUM SERPL-SCNC: 139 MMOL/L — SIGNIFICANT CHANGE UP (ref 135–145)
WBC # BLD: 17.2 K/UL — HIGH (ref 3.8–10.5)
WBC # FLD AUTO: 17.2 K/UL — HIGH (ref 3.8–10.5)

## 2019-07-03 PROCEDURE — 99231 SBSQ HOSP IP/OBS SF/LOW 25: CPT

## 2019-07-03 PROCEDURE — 99222 1ST HOSP IP/OBS MODERATE 55: CPT | Mod: AI

## 2019-07-03 RX ORDER — DEXTROSE 50 % IN WATER 50 %
25 SYRINGE (ML) INTRAVENOUS ONCE
Refills: 0 | Status: DISCONTINUED | OUTPATIENT
Start: 2019-07-03 | End: 2019-07-05

## 2019-07-03 RX ORDER — GLUCAGON INJECTION, SOLUTION 0.5 MG/.1ML
1 INJECTION, SOLUTION SUBCUTANEOUS ONCE
Refills: 0 | Status: DISCONTINUED | OUTPATIENT
Start: 2019-07-03 | End: 2019-07-05

## 2019-07-03 RX ORDER — DEXTROSE 50 % IN WATER 50 %
12.5 SYRINGE (ML) INTRAVENOUS ONCE
Refills: 0 | Status: DISCONTINUED | OUTPATIENT
Start: 2019-07-03 | End: 2019-07-05

## 2019-07-03 RX ORDER — INSULIN LISPRO 100/ML
VIAL (ML) SUBCUTANEOUS
Refills: 0 | Status: DISCONTINUED | OUTPATIENT
Start: 2019-07-03 | End: 2019-07-04

## 2019-07-03 RX ORDER — SODIUM CHLORIDE 9 MG/ML
1000 INJECTION, SOLUTION INTRAVENOUS
Refills: 0 | Status: DISCONTINUED | OUTPATIENT
Start: 2019-07-03 | End: 2019-07-05

## 2019-07-03 RX ORDER — DOCUSATE SODIUM 100 MG
100 CAPSULE ORAL THREE TIMES A DAY
Refills: 0 | Status: DISCONTINUED | OUTPATIENT
Start: 2019-07-03 | End: 2019-07-05

## 2019-07-03 RX ORDER — DEXTROSE 50 % IN WATER 50 %
15 SYRINGE (ML) INTRAVENOUS ONCE
Refills: 0 | Status: DISCONTINUED | OUTPATIENT
Start: 2019-07-03 | End: 2019-07-05

## 2019-07-03 RX ORDER — WATER FOR INHALATION
1000 VIAL, NEBULIZER (ML) INHALATION
Refills: 0 | Status: DISCONTINUED | OUTPATIENT
Start: 2019-07-03 | End: 2019-07-05

## 2019-07-03 RX ORDER — SENNA PLUS 8.6 MG/1
2 TABLET ORAL AT BEDTIME
Refills: 0 | Status: DISCONTINUED | OUTPATIENT
Start: 2019-07-03 | End: 2019-07-05

## 2019-07-03 RX ORDER — WATER FOR INHALATION
1000 VIAL, NEBULIZER (ML) INHALATION
Refills: 0 | Status: DISCONTINUED | OUTPATIENT
Start: 2019-07-03 | End: 2019-07-03

## 2019-07-03 RX ADMIN — Medication 100 MILLIGRAM(S): at 13:18

## 2019-07-03 RX ADMIN — Medication 4 MILLIGRAM(S): at 13:18

## 2019-07-03 RX ADMIN — SODIUM CHLORIDE 100 MILLILITER(S): 9 INJECTION INTRAMUSCULAR; INTRAVENOUS; SUBCUTANEOUS at 05:22

## 2019-07-03 RX ADMIN — Medication 250 MILLILITER(S): at 16:53

## 2019-07-03 RX ADMIN — Medication 4 MILLIGRAM(S): at 00:14

## 2019-07-03 RX ADMIN — PANTOPRAZOLE SODIUM 40 MILLIGRAM(S): 20 TABLET, DELAYED RELEASE ORAL at 05:21

## 2019-07-03 RX ADMIN — LEVETIRACETAM 500 MILLIGRAM(S): 250 TABLET, FILM COATED ORAL at 05:12

## 2019-07-03 RX ADMIN — Medication 4 MILLIGRAM(S): at 17:00

## 2019-07-03 RX ADMIN — Medication 4 MILLIGRAM(S): at 05:12

## 2019-07-03 RX ADMIN — SENNA PLUS 2 TABLET(S): 8.6 TABLET ORAL at 21:23

## 2019-07-03 RX ADMIN — Medication 4 MILLIGRAM(S): at 23:32

## 2019-07-03 RX ADMIN — Medication 100 MILLIGRAM(S): at 21:23

## 2019-07-03 RX ADMIN — LEVETIRACETAM 500 MILLIGRAM(S): 250 TABLET, FILM COATED ORAL at 17:01

## 2019-07-03 NOTE — PHYSICAL THERAPY INITIAL EVALUATION ADULT - IMPAIRMENTS FOUND, PT EVAL
muscle strength/gait, locomotion, and balance
gait, locomotion, and balance/aerobic capacity/endurance/neuromotor development and sensory integration/muscle strength

## 2019-07-03 NOTE — PROGRESS NOTE ADULT - SUBJECTIVE AND OBJECTIVE BOX
HISTORY  68y Female s/p stereotatic aspiration of cystic brain mass    24 HOUR EVENTS: Patient doing well. Patient bradycardic to 35-40 bpm.  Patient asymptomatic throughout event, denies CP, SOB, dizziness, N/V.  ECG done shows sinus bradycardia.  Patient states "I had low heart rate before coming to the hospital".  Patient c/o of 3/10 mild headache, states controlled with Tylenol.  Denies dizziness, n/v, numbness/ tingling or change in vision.     SUBJECTIVE/ROS:  [x ] A ten-point review of systems was otherwise negative except as noted.  [ ] Due to altered mental status/intubation, subjective information were not able to be obtained from the patient. History was obtained, to the extent possible, from review of the chart and collateral sources of information.      NEURO  RASS: 0    GCS:  15   CAM ICU: neg  Exam: No focal neuro deficits, COOK, 5/5 strength  Meds: levETIRAcetam 500 milliGRAM(s) Oral two times a day  meclizine 25 milliGRAM(s) Oral four times a day PRN Dizziness  ondansetron Injectable 4 milliGRAM(s) IV Push once PRN Nausea and/or Vomiting    [x] Adequacy of sedation and pain control has been assessed and adjusted      RESPIRATORY  RR: 16 (07-03-19 @ 05:00) (11 - 32)  SpO2: 96% (07-03-19 @ 05:00) (95% - 100%)  Wt(kg): --  Exam: unlabored, clear to auscultation bilaterally  Mechanical Ventilation:     [ ] Extubation Readiness Assessed  Meds:       CARDIOVASCULAR  HR: 37 (07-03-19 @ 05:00) (36 - 81)  BP: 143/67 (07-03-19 @ 05:00) (119/63 - 177/76)  BP(mean): 97 (07-03-19 @ 05:00) (83 - 102)  ABP: 109/104 (07-03-19 @ 05:00) (1/1 - 164/74)  ABP(mean): 106 (07-03-19 @ 05:00) (1 - 107)  Wt(kg): --  CVP(cm H2O): --      Exam: Sinus bradycardia, RRR  Cardiac Rhythm: Sinus boone  Perfusion     [x ]Adequate   [ ]Inadequate  Mentation   [x ]Normal       [ ]Reduced  Extremities  [x ]Warm         [ ]Cool  Volume Status [ ]Hypervolemic [ x]Euvolemic [ ]Hypovolemic  Meds:       GI/NUTRITION  Exam: soft, nttp, nondistedned  Diet: NPO   Meds: pantoprazole    Tablet 40 milliGRAM(s) Oral before breakfast    Constitutional: NAD, resting comfortably, WNWD  Eyes: PERRL, EOM intact   Head: Dressing c/d/i over incision site  Respiratory: CTA b/l, unlabored   Cardiovascular: RRR sinus boone  Gastrointestinal: abd. soft, NT/ND  Genitourinary: +bruno, yellow urine  Extremities: no LE edema b/l  Neurological: AOx3, sensation grossly intact, 5/5 strength b/l  Skin: warm, dry, intact    GENITOURINARY  I&O's Detail    07-01 @ 07:01  -  07-02 @ 07:00  --------------------------------------------------------  IN:  Total IN: 0 mL    OUT:    Voided: 200 mL  Total OUT: 200 mL    Total NET: -200 mL      07-02 @ 07:01  -  07-03 @ 05:34  --------------------------------------------------------  IN:    sodium chloride 0.9%.: 1400 mL    Solution: 100 mL  Total IN: 1500 mL    OUT:    Indwelling Catheter - Urethral: 1140 mL  Total OUT: 1140 mL    Total NET: 360 mL        Weight (kg): 60 (07-02 @ 11:26)  07-02    141  |  105  |  28.0<H>  ----------------------------<  111  4.8   |  26.0  |  0.79    Ca    8.8      02 Jul 2019 07:20  Phos  3.4     07-02  Mg     2.2     07-02      [ ] Bruno catheter, indication: N/A  Meds: sodium chloride 0.9%. 1000 milliLiter(s) IV Continuous <Continuous>        HEMATOLOGIC  Meds:   [x] VTE Prophylaxis                        13.0   17.20 )-----------( 262      ( 03 Jul 2019 05:05 )             39.2       Transfusion     [ ] PRBC   [ ] Platelets   [ ] FFP   [ ] Cryoprecipitate      INFECTIOUS DISEASES  T(C): 36.5 (07-03-19 @ 05:00), Max: 36.9 (07-02-19 @ 08:27)  Wt(kg): --  WBC Count: 17.20 K/uL (07-03 @ 05:05)  WBC Count: 20.55 K/uL (07-02 @ 07:20)    Recent Cultures:    Meds:       ENDOCRINE  Capillary Blood Glucose    Meds: dexamethasone  Injectable 4 milliGRAM(s) IV Push every 6 hours        ACCESS DEVICES:  [ ] Peripheral IV  [ ] Central Venous Line	[ ] R	[ ] L	[ ] IJ	[ ] Fem	[ ] SC	Placed:   [ ] Arterial Line		[ ] R	[ ] L	[ ] Fem	[ ] Rad	[ ] Ax	Placed:   [ ] PICC:					[ ] Mediport  [ ] Urinary Catheter, Date Placed:   [ ] Necessity of urinary, arterial, and venous catheters discussed    OTHER MEDICATIONS:      CODE STATUS:     IMAGING:

## 2019-07-03 NOTE — PHYSICAL THERAPY INITIAL EVALUATION ADULT - IMPAIRMENTS CONTRIBUTING IMPAIRED BED MOBILITY, REHAB EVAL
decreased strength/impaired coordination/pt dizzy
decreased strength/impaired balance/impaired coordination/impaired motor control/decreased flexibility

## 2019-07-03 NOTE — PHYSICAL THERAPY INITIAL EVALUATION ADULT - GENERAL OBSERVATIONS, REHAB EVAL
Pt received lying on stretcher in ED, (+) cardiac monitor, NAD. Agreeable to PT evaluation.
Pt. received sitting in the chair next to bed, NAD.

## 2019-07-03 NOTE — CONSULT NOTE ADULT - SUBJECTIVE AND OBJECTIVE BOX
Patient is a 68y old  Female who presents with a chief complaint of Nausea/vomiting (03 Jul 2019 08:45)      HPI:  Patient is a 68 year old female with recent admission 6/24 - 6/27 for new right brain mass with vasogenic edema; patient was also found to have a RLL lung mass and underwent a  lung biopsy. Patient was evaluated by neurosurgery, heme/onc and radiation oncology. Patient was supposed to f/u as outpatient for results of the biopsy for further treatment planning. During the hospitalization, patient was started on Decadron and discharged home after patient refused rehab placement. Today, patient returns one day after discharge with complaints of nausea, dizziness, & inability to ambulate.  Patient reports doing well when she initially got home, but thinks the PO decadron triggered her nausea and consequently led to profound dizziness. Last night tried to get up with the 's help. However, patient reports severe nausea and vomited 6 times. In the ED, repeat CT head was done and was essentially unchanged but still with significant edema. Patient seen by neurosurgery, recommended IV decadron and may need cyst fenestration. Currently, patient states nausea has improved. Denies dizziness, blurry vision or headaches. However, reports L-sided weakness and decreased sensation in the L foot compared to the right side. Also, denies chest pain, SOB, fevers, chills, abdominal pain (29 Jun 2019 17:18)      PAST MEDICAL & SURGICAL HISTORY:  Brain tumor  Lung tumor  Carpal tunnel syndrome      FAMILY HISTORY:  No pertinent family history in first degree relatives      SOCIAL HISTORY:     Allergies    No Known Allergies    Intolerances    Ensure TID RDOK (Unknown)        MEDICATIONS  (STANDING):  dexamethasone  Injectable 4 milliGRAM(s) IV Push every 6 hours  dextrose 5%. 1000 milliLiter(s) (50 mL/Hr) IV Continuous <Continuous>  dextrose 50% Injectable 12.5 Gram(s) IV Push once  dextrose 50% Injectable 25 Gram(s) IV Push once  dextrose 50% Injectable 25 Gram(s) IV Push once  docusate sodium 100 milliGRAM(s) Oral three times a day  insulin lispro (HumaLOG) corrective regimen sliding scale   SubCutaneous three times a day before meals  levETIRAcetam 500 milliGRAM(s) Oral two times a day  pantoprazole    Tablet 40 milliGRAM(s) Oral before breakfast  senna 2 Tablet(s) Oral at bedtime    MEDICATIONS  (PRN):  dextrose 40% Gel 15 Gram(s) Oral once PRN Blood Glucose LESS THAN 70 milliGRAM(s)/deciliter  glucagon  Injectable 1 milliGRAM(s) IntraMuscular once PRN Glucose LESS THAN 70 milligrams/deciliter  meclizine 25 milliGRAM(s) Oral four times a day PRN Dizziness  ondansetron Injectable 4 milliGRAM(s) IV Push once PRN Nausea and/or Vomiting      MEDICATIONS:  Antimicrobials:      Cardiovascular:      Pulmonary:      Neurologic:  levETIRAcetam 500 milliGRAM(s) Oral two times a day  meclizine 25 milliGRAM(s) Oral four times a day PRN  ondansetron Injectable 4 milliGRAM(s) IV Push once PRN      Oncologic:      Hematologic:      GI:  docusate sodium 100 milliGRAM(s) Oral three times a day  pantoprazole    Tablet 40 milliGRAM(s) Oral before breakfast  senna 2 Tablet(s) Oral at bedtime      :      Endocrine/Metabolic:  dexamethasone  Injectable 4 milliGRAM(s) IV Push every 6 hours  dextrose 40% Gel 15 Gram(s) Oral once PRN  dextrose 50% Injectable 12.5 Gram(s) IV Push once  dextrose 50% Injectable 25 Gram(s) IV Push once  dextrose 50% Injectable 25 Gram(s) IV Push once  glucagon  Injectable 1 milliGRAM(s) IntraMuscular once PRN  insulin lispro (HumaLOG) corrective regimen sliding scale   SubCutaneous three times a day before meals      Nutrition/Electrolytes:  dextrose 5%. 1000 milliLiter(s) IV Continuous <Continuous>      Immunologic:      Topical agents:      Others:          Vital Signs Last 24 Hrs  T(C): 36.8 (03 Jul 2019 12:00), Max: 36.8 (03 Jul 2019 12:00)  T(F): 98.2 (03 Jul 2019 12:00), Max: 98.2 (03 Jul 2019 12:00)  HR: 49 (03 Jul 2019 14:00) (36 - 63)  BP: 165/74 (03 Jul 2019 14:00) (119/63 - 165/74)  BP(mean): 106 (03 Jul 2019 14:00) (83 - 106)  RR: 15 (03 Jul 2019 14:00) (11 - 32)  SpO2: 94% (03 Jul 2019 14:00) (89% - 98%)    LABS:                        13.0   17.20 )-----------( 262      ( 03 Jul 2019 05:05 )             39.2     07-03    139  |  108<H>  |  22.0<H>  ----------------------------<  114  4.0   |  20.0<L>  |  0.61    Ca    7.9<L>      03 Jul 2019 05:05  Phos  3.8     07-03  Mg     2.0     07-03            RADIOLOGY & ADDITIONAL STUDIES:      REVIEW OF SYSTEMS:    Denied chest pain, SOB, palpitation, abd. pain, nausea, vomiting, headache, dizziness, numbness, tingling, urinary and bowel complaints.         PHYSICAL EXAM:    GENERAL: NAD  EYES: EOMI, PERRLA,  NECK: Supple, No JVD, Normal thyroid  NERVOUS SYSTEM:  Alert & Oriented X3, Good concentration; Motor Strength 5/5 B/L upper and lower extremities; DTRs 2+ intact and symmetric  CHEST/LUNG: Clear to percussion bilaterally; No rales, rhonchi, wheezing, or rubs  HEART: s1/s2 audible  ABDOMEN: Soft, Nontender, Nondistended; Bowel sounds present  EXTREMITIES:  no edema  SKIN: No rashes or lesions      Radiology     < from: CT Head No Cont (07.02.19 @ 22:40) >    IMPRESSION:    Study post interval right frontal paula hole for biopsy of right   paramedian frontal lobe/transcallosal lesion with expected evolving   postprocedural changes, as above.    < end of copied text >

## 2019-07-03 NOTE — PROGRESS NOTE ADULT - ASSESSMENT
A/P: 67yo female s/p drainage of intracranial cyst/mass by neurosurgery admitted to SICU for frequent neurological checks and hemodynamic monitoring.  Doing well post op, repeat head ct scan consistent with post op changes, no neuro deficits.     Neuro: R posterior frontal/parietal cyst pod1 drainage. Cont neuro checks as per neurosurgery.  Cont decadron.  Cont pain regimen.  F/u pathology.    Pulm: Pulmonary tolieting, oob to chair, work w/ PT/OT.    Card: Cont noninvasive hemodynamic monitoring.  Cards on board.     GI: Adv diet to reg/ IVL.  Zofran prn.     : INES Li today 7/3    Endo: FS while on decadron.     Hem/DVT: SCDs, hold chemical dvt ppx    ID: none    Lines/Tubes: PIV    Skin: Frequent turning, dressing change as per neurosurg    Dispo: SICU, neurosurgery on board

## 2019-07-03 NOTE — DIETITIAN INITIAL EVALUATION ADULT. - OTHER INFO
Patient is a 68 year old female with recent admission 6/24 - 6/27 for new right brain mass with vasogenic edema; patient was also found to have a RLL lung mass and underwent a  lung biopsy.  Pt was readmitted for N/V, s/p stereotatic aspiration of cystic brain mass (7/2) Pt awake and alert. Started no Regular diet this morning, ate small amount, however tolerated well without difficulties chewing or swallowing. Pt report eating well at home, Recently intentionally lost 25# by eating healthy and cutting portions. Encouraged HBV protein foods to maintain current wt and prevent wt loss, preferences obtained. Pt with good understanding, will monitor intake and wt's.

## 2019-07-03 NOTE — PHYSICAL THERAPY INITIAL EVALUATION ADULT - ACTIVE RANGE OF MOTION EXAMINATION, REHAB EVAL
bilateral upper extremity Active ROM was WFL (within functional limits)/bilateral  lower extremity Active ROM was WFL (within functional limits)
at least 3-/5, assessed during functional mobility, resistance not applied/bilateral  lower extremity Active ROM was WFL (within functional limits)/bilateral upper extremity Active ROM was WFL (within functional limits)

## 2019-07-03 NOTE — PHYSICAL THERAPY INITIAL EVALUATION ADULT - IMPAIRED TRANSFERS: SIT/STAND, REHAB EVAL
decreased strength/active on the Left UE/LE/impaired coordination/impaired balance/impaired motor control

## 2019-07-03 NOTE — CHART NOTE - NSCHARTNOTEFT_GEN_A_CORE
Mildly low output.  Given 1 liter of sodium acetate over 4 hours.      I also called Dr De Paz's service for Heme/onc since Dr Benson not on same service and NSX asks for new Oncologist.

## 2019-07-03 NOTE — PHYSICAL THERAPY INITIAL EVALUATION ADULT - PLANNED THERAPY INTERVENTIONS, PT EVAL
balance training/bed mobility training/gait training/strengthening/transfer training
bed mobility training/gait training/transfer training

## 2019-07-03 NOTE — CONSULT NOTE ADULT - ASSESSMENT
Patient is a 68 year old female with PMH of Carpal tunnel syndrome, former smoker quit 7-8 years ago, and wrist fracture repair in R hand who was recently admitted and discharged home yesterday after being diagnosed with a new right brain mass and a RLL lung mass. Patient was discharged home after she declined acute rehab and was instructed to follow up as outpatient for the biopsy reports,  readmitted with intractable nausea, vomiting and dizziness, noticed  multifocal brain metastasis, most notably with a hemorrhagic cystic component, S/p drainage of cyst/mass. Pt is stable, downgraded to hospitalist service    A/P    >Brain metastatic with cystic lesion - s/p drainage of cyst/mass  -NS on board  -continue Decadron 4 mg q6hrs  -cont. Keppra 500mg BID  -Neuro check q4h  _Pt eval   f/u pathology    2. Lung Mass  -s/p lung biopsy on 6/26  -outpatient follow up with hem/onc as scheduled    DVT ppx - SCDS (no chemical ppx as discussed with neurosurgery)

## 2019-07-03 NOTE — PHYSICAL THERAPY INITIAL EVALUATION ADULT - CRITERIA FOR SKILLED THERAPEUTIC INTERVENTIONS
functional limitations in following categories/impairments found/risk reduction/prevention/rehab potential
risk reduction/prevention/therapy frequency/predicted duration of therapy intervention/impairments found/functional limitations in following categories/rehab potential/anticipated equipment needs at discharge/anticipated discharge recommendation

## 2019-07-03 NOTE — PROGRESS NOTE ADULT - SUBJECTIVE AND OBJECTIVE BOX
I went to consult on this patient but was met by her  who told me that they will be seeking oncology care at [facility].   was given my card - available prn.

## 2019-07-03 NOTE — PHYSICAL THERAPY INITIAL EVALUATION ADULT - PERTINENT HX OF CURRENT PROBLEM, REHAB EVAL
69 y/o female presents to [facility] ED via BIBA with c/o dizziness, nausea/vomiting since last night. Pt was recently admitted 6/25-6/28 with impaired coordination. New dx brain tumor.
brain mass

## 2019-07-03 NOTE — PHYSICAL THERAPY INITIAL EVALUATION ADULT - DISCHARGE DISPOSITION, PT EVAL
rehabilitation facility/acute rehabilitation
Acute Rehab Services vs Home pending progress/rehabilitation facility/outpatient PT

## 2019-07-03 NOTE — PROGRESS NOTE ADULT - SUBJECTIVE AND OBJECTIVE BOX
SUBJECTIVE:  POD#1 s/p hemorrhagic brain cyst aspiration  Patient maintains on dex 4q6 and keppra.  Overnight asymptomatic bradycardia. Afebrile.     Vital Signs Last 24 Hrs  T(C): 36.7 (03 Jul 2019 08:00), Max: 36.7 (02 Jul 2019 11:26)  T(F): 98.1 (03 Jul 2019 08:00), Max: 98.1 (03 Jul 2019 00:00)  HR: 40 (03 Jul 2019 08:00) (36 - 81)  BP: 161/73 (03 Jul 2019 08:00) (119/63 - 177/76)  BP(mean): 105 (03 Jul 2019 08:00) (83 - 105)  RR: 11 (03 Jul 2019 08:00) (11 - 32)  SpO2: 96% (03 Jul 2019 08:00) (95% - 100%)  PHYSICAL EXAM:  GENERAL: NAD, well-groomed, well-developed  HEAD:  Atraumatic, normocephalic  DRAINS:   WOUND: Dressing clean dry intact  MENTAL STATUS: AAO x3; Awake/Comatose; Opens eyes spontaneously/to voice/to light touch/to noxious stimuli; Appropriately conversant without aphasia/Nonverbal; following simple commands/mimicking/not following commands  CRANIAL NERVES: PERRL; EOMI; corneals intact b/l; Dolls sign positive; blinks to threat b/l; no facial asymmetry; facial sensation grossly intact to light touch b/l;  tongue midline; palate rises symmetrically; gag reflex intact  MOTOR: strength 5/5 B/L upper and lower extremities; sensation grossly intact all extremities; DTRs 2+ intact and symmetric; negative Coffey's b/l; negative clonus b/l  CHEST/LUNG: Clear to auscultation bilaterally; no rales, rhonchi, wheezing, or rubs  HEART: +S1/+S2; Regular rate and rhythm; no murmurs, rubs, or gallops  ABDOMEN: Soft, nontender, nondistended; bowel sounds present all four quadrants  EXTREMITIES:  2+ peripheral pulses, no clubbing, cyanosis, or edema  SKIN: Warm, dry; no rashes or lesions      LABS:             13.0   17.20 )-----------( 262              39.2     139  |  108<H>  |  22.0<H>  ----------------------------<  114  4.0   |  20.0<L>  |  0.61    Ca    7.9<L>       Phos  3.8     Mg     2.0              07-02 @ 07:01  -  07-03 @ 07:00  --------------------------------------------------------  IN: 1500 mL / OUT: 1215 mL / NET: 285 mL    07-03 @ 07:01  -  07-03 @ 08:50  --------------------------------------------------------  IN: 0 mL / OUT: 30 mL / NET: -30 mL        RADIOLOGY & ADDITIONAL TESTS: SUBJECTIVE:  POD#1 s/p hemorrhagic brain cyst aspiration  Patient maintains on dex 4q6 and keppra.  Overnight asymptomatic bradycardia. Afebrile. Mild incisional pain, relieved with tylenol. Patient reports improved sensation. Required minimal assistance OOB to chair this morning but still complains of mild proprioception disparity. Li d/c'd this morning and she is on full diet.     Vital Signs Last 24 Hrs  T(C): 36.7 (03 Jul 2019 08:00), Max: 36.7 (02 Jul 2019 11:26)  T(F): 98.1 (03 Jul 2019 08:00), Max: 98.1 (03 Jul 2019 00:00)  HR: 40 (03 Jul 2019 08:00) (36 - 81)  BP: 161/73 (03 Jul 2019 08:00) (119/63 - 177/76)  BP(mean): 105 (03 Jul 2019 08:00) (83 - 105)  RR: 11 (03 Jul 2019 08:00) (11 - 32)  SpO2: 96% (03 Jul 2019 08:00) (95% - 100%)  PHYSICAL EXAM:  GENERAL: NAD, well-groomed, well-developed  HEAD:  post op changes  WOUND: Dressing clean dry intact  MENTAL STATUS: AAO x3; Appropriately conversant with mild occasional paraphasic errors, following simple commands  CRANIAL NERVES: PERRL; EOMI; mild L facial asymmetry; facial sensation subjectively intact to light touch b/l;  tongue midline  MOTOR/SENSORY: strength 5/5 B/L upper and lower extremities; mild diminished sensation to LT in the LUE, negative Coffey's b/l; negative clonus b/l, +L dysmetria  HEART: +S1/+S2, borderline bradycardia  ABDOMEN: Soft, nontender, nondistended; bowel sounds present all four quadrants  EXTREMITIES:  2+ peripheral pulses, no clubbing, cyanosis, or edema  SKIN: Warm, dry; no rashes or lesions    LABS:             13.0   17.20 )-----------( 262              39.2     139  |  108<H>  |  22.0<H>  ----------------------------<  114  4.0   |  20.0<L>  |  0.61    Ca    7.9<L>       Phos  3.8     Mg     2.0      RADIOLOGY & ADDITIONAL TESTS:  CT Head No Cont (07.02.19 @ 22:40) - POST OP  Brain: 4 x 3 cm mass lesion is seen in right medial frontoparietal lobe crossing the midline with a moderate amount of vasogenic edema in right parietal lobe. No evidence of herniation or midline shift.   Ventricles: Normal. No ventriculomegaly.   Bones/joints: Right parietal paula hole with changes due to recent biopsy. Small amount of postsurgical pneumocephalus seen.   Sinuses: Visualized sinuses are unremarkable. No fluid levels.   Mastoid air cells: Visualized mastoid air cells are well aerated. No mastoid effusion.   Soft tissues: Unremarkable.   Other findings: No evidence of hemorrhage.   IMPRESSION:   Mass lesion is seen with surgical changes or recent biopsy. No post procedure complication seen.    MR Brain Stereotactic w/ IV Cont (07.02.19 @ 10:58)  IMPRESSION:   1.  Redemonstration of a mass with solid and cystic/necrotic components   in the right posterior frontal/parietal region.   2.  Two additional centrally necrotic peripherally enhancing lesions in   the right frontal lobe and left occipital lobe.  3.  A third enhancing lesion measuring 3 mm along the left frontal   convexity may be parenchymal or leptomeningeal. This was not apparent on   the prior study which may be due to differences in technique.  4.  New area of FLAIR hyperintensity in the medial left cerebellum,   without associated enhancement, which is incompletely characterized. SUBJECTIVE:  POD#1 s/p hemorrhagic brain cyst aspiration  Patient maintains on dex 4q6 and keppra.  Overnight asymptomatic bradycardia. Afebrile. Mild incisional pain, relieved with tylenol. Patient reports improved tactile sensation. Required minimal assistance OOB to chair this morning but still complains of mild proprioception disparity. Li d/c'd this morning and she is on full diet. Still with mild residual room-spinning sensation.    Vital Signs Last 24 Hrs  T(C): 36.7 (03 Jul 2019 08:00), Max: 36.7 (02 Jul 2019 11:26)  T(F): 98.1 (03 Jul 2019 08:00), Max: 98.1 (03 Jul 2019 00:00)  HR: 40 (03 Jul 2019 08:00) (36 - 81)  BP: 161/73 (03 Jul 2019 08:00) (119/63 - 177/76)  BP(mean): 105 (03 Jul 2019 08:00) (83 - 105)  RR: 11 (03 Jul 2019 08:00) (11 - 32)  SpO2: 96% (03 Jul 2019 08:00) (95% - 100%)  PHYSICAL EXAM:  GENERAL: NAD, well-groomed, well-developed  HEAD:  post op changes  WOUND: Dressing clean dry intact  MENTAL STATUS: AAO x3; Appropriately conversant with mild occasional paraphasic errors, following simple commands  CRANIAL NERVES: PERRL; EOMI; mild L facial asymmetry; facial sensation subjectively intact to light touch b/l;  tongue midline  MOTOR/SENSORY: strength 5/5 B/L upper and lower extremities; mild diminished sensation to LT in the LUE, negative Coffey's b/l; negative clonus b/l, +L dysmetria  HEART: +S1/+S2, borderline bradycardia  ABDOMEN: Soft, nontender, nondistended; bowel sounds present all four quadrants  EXTREMITIES:  2+ peripheral pulses, no clubbing, cyanosis, or edema  SKIN: Warm, dry; no rashes or lesions    LABS:             13.0   17.20 )-----------( 262              39.2     139  |  108<H>  |  22.0<H>  ----------------------------<  114  4.0   |  20.0<L>  |  0.61    Ca    7.9<L>       Phos  3.8     Mg     2.0      RADIOLOGY & ADDITIONAL TESTS:  CT Head No Cont (07.02.19 @ 22:40) - POST OP  Brain: 4 x 3 cm mass lesion is seen in right medial frontoparietal lobe crossing the midline with a moderate amount of vasogenic edema in right parietal lobe. No evidence of herniation or midline shift.   Ventricles: Normal. No ventriculomegaly.   Bones/joints: Right parietal paula hole with changes due to recent biopsy. Small amount of postsurgical pneumocephalus seen.   Sinuses: Visualized sinuses are unremarkable. No fluid levels.   Mastoid air cells: Visualized mastoid air cells are well aerated. No mastoid effusion.   Soft tissues: Unremarkable.   Other findings: No evidence of hemorrhage.   IMPRESSION:   Mass lesion is seen with surgical changes or recent biopsy. No post procedure complication seen.    MR Brain Stereotactic w/ IV Cont (07.02.19 @ 10:58)  IMPRESSION:   1.  Redemonstration of a mass with solid and cystic/necrotic components   in the right posterior frontal/parietal region.   2.  Two additional centrally necrotic peripherally enhancing lesions in   the right frontal lobe and left occipital lobe.  3.  A third enhancing lesion measuring 3 mm along the left frontal   convexity may be parenchymal or leptomeningeal. This was not apparent on   the prior study which may be due to differences in technique.  4.  New area of FLAIR hyperintensity in the medial left cerebellum,   without associated enhancement, which is incompletely characterized.

## 2019-07-03 NOTE — PHYSICAL THERAPY INITIAL EVALUATION ADULT - ADDITIONAL COMMENTS
Pt reports that she lives her spouse in a private house. 2 steps to enter with rail and flight to bedroom with rail. Was independent prior to most recent hospital admission (6/5/19), no device.
as per pt. and  at the bedside: pt. lives in the private house, 3 steps (+) rail to enter, owns RW,  available to provide assistance as needed upon D/C home

## 2019-07-03 NOTE — PROGRESS NOTE ADULT - ASSESSMENT
NOTE IS INCOMPLETE 67 y/o female with chief complaint of dizziness and dysmetria/loss of proprioception found to have newly diagnosed RLL NSCLC with multifocal brain metastasis, most notably with a hemorrhagic cystic component.  POD#1 s/p cystic mass aspiration.    PLAN:  - d/c bruno  - agree with diet advancement  - can downgrade today  - continue Keppra & dexamethasone  - PRN pain for headaches or incisional pain  - okay for lovenox for DVT ppx 48 hours post op  - PT/OT/ST  - patient will need outpatient establishment with heme/onc and Rad Onc    NOTE IS INCOMPLETE 67 y/o female with chief complaint of dizziness and dysmetria/loss of proprioception found to have newly diagnosed RLL NSCLC with multifocal brain metastasis, most notably with a hemorrhagic cystic component.  POD#1 s/p cystic mass aspiration.    PLAN:  - d/c bruno  - agree with diet advancement  - can downgrade today to q4h neurochecks  - continue Keppra & dexamethasone  - PRN pain for headaches or incisional pain  - SCDs for DVT ppx today  - PT/OT/ST  - patient will need outpatient establishment with heme/onc and Rad Onc    Will continue to follow

## 2019-07-03 NOTE — DIETITIAN INITIAL EVALUATION ADULT. - PERTINENT LABORATORY DATA
07-03 Na139 mmol/L Glu 114 mg/dL K+ 4.0 mmol/L Cr  0.61 mg/dL BUN 22.0 mg/dL<H> Phos 3.8 mg/dL Alb n/a   PAB n/a     n/a  HgbA1C

## 2019-07-04 LAB
ANION GAP SERPL CALC-SCNC: 10 MMOL/L — SIGNIFICANT CHANGE UP (ref 5–17)
ANISOCYTOSIS BLD QL: SLIGHT — SIGNIFICANT CHANGE UP
BASOPHILS # BLD AUTO: 0 K/UL — SIGNIFICANT CHANGE UP (ref 0–0.2)
BASOPHILS NFR BLD AUTO: 0 % — SIGNIFICANT CHANGE UP (ref 0–2)
BUN SERPL-MCNC: 25 MG/DL — HIGH (ref 8–20)
CALCIUM SERPL-MCNC: 8.8 MG/DL — SIGNIFICANT CHANGE UP (ref 8.6–10.2)
CHLORIDE SERPL-SCNC: 101 MMOL/L — SIGNIFICANT CHANGE UP (ref 98–107)
CO2 SERPL-SCNC: 26 MMOL/L — SIGNIFICANT CHANGE UP (ref 22–29)
CREAT SERPL-MCNC: 0.8 MG/DL — SIGNIFICANT CHANGE UP (ref 0.5–1.3)
EOSINOPHIL # BLD AUTO: 0 K/UL — SIGNIFICANT CHANGE UP (ref 0–0.5)
EOSINOPHIL NFR BLD AUTO: 0 % — SIGNIFICANT CHANGE UP (ref 0–6)
GLUCOSE SERPL-MCNC: 99 MG/DL — SIGNIFICANT CHANGE UP (ref 70–115)
HBA1C BLD-MCNC: 5.7 % — HIGH (ref 4–5.6)
HCT VFR BLD CALC: 42.9 % — SIGNIFICANT CHANGE UP (ref 34.5–45)
HGB BLD-MCNC: 14 G/DL — SIGNIFICANT CHANGE UP (ref 11.5–15.5)
LYMPHOCYTES # BLD AUTO: 1.43 K/UL — SIGNIFICANT CHANGE UP (ref 1–3.3)
LYMPHOCYTES # BLD AUTO: 7 % — LOW (ref 13–44)
MAGNESIUM SERPL-MCNC: 2 MG/DL — SIGNIFICANT CHANGE UP (ref 1.6–2.6)
MANUAL SMEAR VERIFICATION: SIGNIFICANT CHANGE UP
MCHC RBC-ENTMCNC: 30.5 PG — SIGNIFICANT CHANGE UP (ref 27–34)
MCHC RBC-ENTMCNC: 32.6 GM/DL — SIGNIFICANT CHANGE UP (ref 32–36)
MCV RBC AUTO: 93.5 FL — SIGNIFICANT CHANGE UP (ref 80–100)
MONOCYTES # BLD AUTO: 1.83 K/UL — HIGH (ref 0–0.9)
MONOCYTES NFR BLD AUTO: 9 % — SIGNIFICANT CHANGE UP (ref 2–14)
MYELOCYTES NFR BLD: 1 % — HIGH (ref 0–0)
NEUTROPHILS # BLD AUTO: 16.71 K/UL — HIGH (ref 1.8–7.4)
NEUTROPHILS NFR BLD AUTO: 82 % — HIGH (ref 43–77)
NRBC # BLD: 0 /100 — SIGNIFICANT CHANGE UP (ref 0–0)
OSMOLALITY SERPL: 299 MOSM/KG — SIGNIFICANT CHANGE UP (ref 280–300)
OVALOCYTES BLD QL SMEAR: SLIGHT — SIGNIFICANT CHANGE UP
PHOSPHATE SERPL-MCNC: 2.9 MG/DL — SIGNIFICANT CHANGE UP (ref 2.4–4.7)
PLAT MORPH BLD: NORMAL — SIGNIFICANT CHANGE UP
PLATELET # BLD AUTO: 275 K/UL — SIGNIFICANT CHANGE UP (ref 150–400)
POTASSIUM SERPL-MCNC: 4.5 MMOL/L — SIGNIFICANT CHANGE UP (ref 3.5–5.3)
POTASSIUM SERPL-SCNC: 4.5 MMOL/L — SIGNIFICANT CHANGE UP (ref 3.5–5.3)
RBC # BLD: 4.59 M/UL — SIGNIFICANT CHANGE UP (ref 3.8–5.2)
RBC # FLD: 14.2 % — SIGNIFICANT CHANGE UP (ref 10.3–14.5)
RBC BLD AUTO: SIGNIFICANT CHANGE UP
SCHISTOCYTES BLD QL AUTO: SLIGHT — SIGNIFICANT CHANGE UP
SODIUM SERPL-SCNC: 137 MMOL/L — SIGNIFICANT CHANGE UP (ref 135–145)
VARIANT LYMPHS # BLD: 1 % — SIGNIFICANT CHANGE UP (ref 0–6)
WBC # BLD: 20.38 K/UL — HIGH (ref 3.8–10.5)
WBC # FLD AUTO: 20.38 K/UL — HIGH (ref 3.8–10.5)

## 2019-07-04 PROCEDURE — 99232 SBSQ HOSP IP/OBS MODERATE 35: CPT

## 2019-07-04 RX ORDER — POLYETHYLENE GLYCOL 3350 17 G/17G
17 POWDER, FOR SOLUTION ORAL DAILY
Refills: 0 | Status: DISCONTINUED | OUTPATIENT
Start: 2019-07-04 | End: 2019-07-05

## 2019-07-04 RX ADMIN — POLYETHYLENE GLYCOL 3350 17 GRAM(S): 17 POWDER, FOR SOLUTION ORAL at 13:59

## 2019-07-04 RX ADMIN — Medication 100 MILLIGRAM(S): at 13:59

## 2019-07-04 RX ADMIN — Medication 100 MILLIGRAM(S): at 05:55

## 2019-07-04 RX ADMIN — Medication 100 MILLIGRAM(S): at 21:26

## 2019-07-04 RX ADMIN — LEVETIRACETAM 500 MILLIGRAM(S): 250 TABLET, FILM COATED ORAL at 17:30

## 2019-07-04 RX ADMIN — Medication 4 MILLIGRAM(S): at 13:59

## 2019-07-04 RX ADMIN — Medication 4 MILLIGRAM(S): at 05:55

## 2019-07-04 RX ADMIN — SENNA PLUS 2 TABLET(S): 8.6 TABLET ORAL at 21:26

## 2019-07-04 RX ADMIN — LEVETIRACETAM 500 MILLIGRAM(S): 250 TABLET, FILM COATED ORAL at 05:55

## 2019-07-04 RX ADMIN — Medication 4 MILLIGRAM(S): at 20:04

## 2019-07-04 RX ADMIN — PANTOPRAZOLE SODIUM 40 MILLIGRAM(S): 20 TABLET, DELAYED RELEASE ORAL at 05:55

## 2019-07-04 RX ADMIN — Medication 4 MILLIGRAM(S): at 23:46

## 2019-07-04 NOTE — PROVIDER CONTACT NOTE (OTHER) - SITUATION
Pt sinus boone HR in the 40's overnight and 50-60's today and pt asymptomatic.
dr cartagena at [facility] states will d/c Accu-Check and coverage.
PT orders received. Chart reviewed, contents noted. Pt seen in ED for PT evaluation, see consult for details. Pt left as found, NAD. Requesting bed pan.  Nursing aware. PT will follow as appropriate.
Patient admitted with dizziness, recently diagnosed with brain / lung mass. Patient on cardiac monitor and heart rate fell to 37.

## 2019-07-04 NOTE — PROGRESS NOTE ADULT - SUBJECTIVE AND OBJECTIVE BOX
NEUROSURGERY PROGRESS NOTE:  69 y/o female with chief complaint of dizziness and dysmetria/loss of proprioception found to have newly diagnosed RLL NSCLC with multifocal brain metastasis, most notably with a hemorrhagic cystic component.  POD#2 s/p cystic mass aspiration.    pt seen and states is at baseline, denied any new or worsening sensorimotor changes, admits that the L-sided strength is improving    -headache   - Nausea / - Vomiting  no neck pain     MEDICATIONS  (STANDING):  dexamethasone  Injectable 4 milliGRAM(s) IV Push every 6 hours  dextrose 5%. 1000 milliLiter(s) (50 mL/Hr) IV Continuous <Continuous>  dextrose 50% Injectable 12.5 Gram(s) IV Push once  dextrose 50% Injectable 25 Gram(s) IV Push once  dextrose 50% Injectable 25 Gram(s) IV Push once  docusate sodium 100 milliGRAM(s) Oral three times a day  levETIRAcetam 500 milliGRAM(s) Oral two times a day  pantoprazole    Tablet 40 milliGRAM(s) Oral before breakfast  polyethylene glycol 3350 17 Gram(s) Oral daily  senna 2 Tablet(s) Oral at bedtime  sterile water 1000 milliLiter(s) (250 mL/Hr) IV Continuous <Continuous>    MEDICATIONS  (PRN):  dextrose 40% Gel 15 Gram(s) Oral once PRN Blood Glucose LESS THAN 70 milliGRAM(s)/deciliter  glucagon  Injectable 1 milliGRAM(s) IntraMuscular once PRN Glucose LESS THAN 70 milligrams/deciliter  meclizine 25 milliGRAM(s) Oral four times a day PRN Dizziness  ondansetron Injectable 4 milliGRAM(s) IV Push once PRN Nausea and/or Vomiting    Allergies    No Known Allergies    Intolerances    Ensure TID RDOK (Unknown)    Vital Signs Last 24 Hrs  T(C): 36.9 (04 Jul 2019 07:34), Max: 36.9 (04 Jul 2019 07:34)  T(F): 98.4 (04 Jul 2019 07:34), Max: 98.4 (04 Jul 2019 07:34)  HR: 57 (04 Jul 2019 07:34) (47 - 74)  BP: 176/87 (04 Jul 2019 07:34) (130/63 - 178/78)  BP(mean): 110 (03 Jul 2019 18:00) (90 - 112)  RR: 18 (04 Jul 2019 07:34) (14 - 32)  SpO2: 94% (04 Jul 2019 07:34) (89% - 96%)        PHYSICAL EXAM:  GENERAL: NAD, well-groomed, well-developed  HEAD:  post op changes, + OR dressing to posterioperietal area/ stained since OR/ no new oozing noted. appreciated   MENTAL STATUS: AAO x3; Appropriately conversant with mild occasional paraphasic errors, following simple commands  CRANIAL NERVES: PERRL; EOMI; mild L facial asymmetry; facial sensation subjectively intact to light touch b/l;  tongue midline  MOTOR/SENSORY: strength 5/5 B/L upper and lower extremities; mild diminished sensation to LT in the LUE, negative Coffey's b/l; negative clonus b/l, +L dysmetria  HEART: +S1/+S2, borderline bradycardia  ABDOMEN: Soft, nontender, nondistended; bowel sounds present all four quadrants  EXTREMITIES:  2+ peripheral pulses, no clubbing, cyanosis, or edema  SKIN: Warm, dry; no rashes or lesions        LABS:                        14.0   20.38 )-----------( 275      ( 04 Jul 2019 08:17 )             42.9     07-04    137  |  101  |  25.0<H>  ----------------------------<  99  4.5   |  26.0  |  0.80    Ca    8.8      04 Jul 2019 08:17  Phos  2.9     07-04  Mg     2.0     07-04        RADIOLOGY & ADDITIONAL TESTS:  no new NSx images available for review       Time Spent with patient/ education: 30 mins

## 2019-07-04 NOTE — PROGRESS NOTE ADULT - ATTENDING COMMENTS
WILDA Attg:    see above    patient seen by PA staff    doing well post-operatively    agree with plan as above
Seen and examined.    NAD  aaox4  Regular bradycardia.  non labored resp    mild non gap hyperchloremic metabolic acidosis noted on today's labs.  Likely secondary to NSS.  Is now IV locked and tolerating diet.  Will monitor for now, recheck bmp in AM.  OK for transition to floor.
NSGY Attg:    see above    patient seen and examined    exam stable    CT reviewed -- expected post-op changes with decompression of cyst; no significant acute ICH    agree with plan as above

## 2019-07-04 NOTE — PROGRESS NOTE ADULT - ASSESSMENT
67 y/o female with chief complaint of dizziness and dysmetria/loss of proprioception found to have newly diagnosed RLL NSCLC with multifocal brain metastasis, most notably with a hemorrhagic cystic component.  POD#2 s/p cystic mass aspiration/ at baseline     heme/onc - [facility]  recommend bowel regiment - no BM since OR   decadron taper slowly to 2mg q12h or as per [facility] recommendation   path pending  PT: home w HC vs AR  further plan as per Dr Talbot

## 2019-07-04 NOTE — PROGRESS NOTE ADULT - SUBJECTIVE AND OBJECTIVE BOX
Internal Medicine Hospitalist - Dr. Miranda MARSH    [id number]    68y      Female    Patient is a 68y old  Female who presents with a chief complaint of Nausea/vomiting (03 Jul 2019 22:44)    INTERVAL HPI/ OVERNIGHT EVENTS: Patient is seen and examined, denied headache, dizziness, numbness, improving weakness L side    REVIEW OF SYSTEMS:    Denied fever, chills, abd. pain, nausea, vomiting, chest pain, SOB, headache, dizziness    PHYSICAL EXAM:    Vital Signs Last 24 Hrs  T(C): 36.9 (04 Jul 2019 07:34), Max: 36.9 (04 Jul 2019 07:34)  T(F): 98.4 (04 Jul 2019 07:34), Max: 98.4 (04 Jul 2019 07:34)  HR: 57 (04 Jul 2019 07:34) (47 - 74)  BP: 176/87 (04 Jul 2019 07:34) (127/60 - 178/78)  BP(mean): 110 (03 Jul 2019 18:00) (87 - 112)  RR: 18 (04 Jul 2019 07:34) (14 - 32)  SpO2: 94% (04 Jul 2019 07:34) (89% - 97%)    GENERAL: NAD  CHEST/LUNG: CTA b/l   HEART: S1S2+ audible  ABDOMEN: Soft, Nontender, Nondistended; Bowel sounds present  EXTREMITIES:  no edema  CNS: AAO X 3, power 5/5 all 4 extremities, sensation intact   Psychiatry: normal mood    LABS:                        13.0   17.20 )-----------( 262      ( 03 Jul 2019 05:05 )             39.2     07-03    139  |  108<H>  |  22.0<H>  ----------------------------<  114  4.0   |  20.0<L>  |  0.61    Ca    7.9<L>      03 Jul 2019 05:05  Phos  3.8     07-03  Mg     2.0     07-03      MEDICATIONS  (STANDING):  dexamethasone  Injectable 4 milliGRAM(s) IV Push every 6 hours  dextrose 5%. 1000 milliLiter(s) (50 mL/Hr) IV Continuous <Continuous>  dextrose 50% Injectable 12.5 Gram(s) IV Push once  dextrose 50% Injectable 25 Gram(s) IV Push once  dextrose 50% Injectable 25 Gram(s) IV Push once  docusate sodium 100 milliGRAM(s) Oral three times a day  levETIRAcetam 500 milliGRAM(s) Oral two times a day  pantoprazole    Tablet 40 milliGRAM(s) Oral before breakfast  polyethylene glycol 3350 17 Gram(s) Oral daily  senna 2 Tablet(s) Oral at bedtime  sterile water 1000 milliLiter(s) (250 mL/Hr) IV Continuous <Continuous>    MEDICATIONS  (PRN):  dextrose 40% Gel 15 Gram(s) Oral once PRN Blood Glucose LESS THAN 70 milliGRAM(s)/deciliter  glucagon  Injectable 1 milliGRAM(s) IntraMuscular once PRN Glucose LESS THAN 70 milligrams/deciliter  meclizine 25 milliGRAM(s) Oral four times a day PRN Dizziness  ondansetron Injectable 4 milliGRAM(s) IV Push once PRN Nausea and/or Vomiting      RADIOLOGY & ADDITIONAL TEST

## 2019-07-04 NOTE — PROGRESS NOTE ADULT - ASSESSMENT
Patient is a 68 year old female with PMH of Carpal tunnel syndrome, former smoker quit 7-8 years ago, and wrist fracture repair in R hand who was recently admitted and discharged home yesterday after being diagnosed with a new right brain mass and a RLL lung mass. Patient was discharged home after she declined acute rehab and was instructed to follow up as outpatient for the biopsy reports,  readmitted with intractable nausea, vomiting and dizziness, noticed  multifocal brain metastasis, most notably with a hemorrhagic cystic component, S/p drainage of cyst/mass. Pt is stable, downgraded to hospitalist service    A/P    >Brain metastatic with cystic lesion - s/p drainage of cyst/mass  -NS on board  -continue Decadron 4 mg q6hrs plus PPI  -cont. Keppra 500mg BID  -Neuro check q4h  -Pt eval appreciated - acute vs home - will get PMR consult  f/u pathology    2. Lung Mass  -s/p lung biopsy on 6/26  -outpatient follow up with hem/onc as scheduled    3. Sinus Bradycardia -review old record, chronic  -asymptomatic  -EKG without evidence of heart block  -avoid AVN blockers  -TSH WNL  -monitor HR closely    4. Constipation- c/w colace, see, add Miralex    DVT ppx - SCDS (no chemical ppx as discussed with neurosurgery) Patient is a 68 year old female with PMH of Carpal tunnel syndrome, former smoker quit 7-8 years ago, and wrist fracture repair in R hand who was recently admitted and discharged home yesterday after being diagnosed with a new right brain mass and a RLL lung mass. Patient was discharged home after she declined acute rehab and was instructed to follow up as outpatient for the biopsy reports,  readmitted with intractable nausea, vomiting and dizziness, noticed  multifocal brain metastasis, most notably with a hemorrhagic cystic component, S/p drainage of cyst/mass. Pt is stable, downgraded to hospitalist service    A/P    >Brain metastatic with cystic lesion - s/p drainage of cyst/mass  -NS on board  -continue Decadron 4 mg q6hrs plus PPI  -cont. Keppra 500mg BID  -Neuro check q4h  -Pt eval appreciated - acute vs home - will get PMR consult  f/u pathology  Family wishes to f/u [facility] as an outpatient    2. Lung Mass  -s/p lung biopsy on 6/26  -outpatient follow up with hem/onc as scheduled    3. Sinus Bradycardia -review old record, chronic  -asymptomatic  -EKG without evidence of heart block  -avoid AVN blockers  -TSH WNL  -monitor HR closely    4. Constipation- c/w colace, see, add Miralex    DVT ppx - SCDS (no chemical ppx as discussed with neurosurgery)

## 2019-07-04 NOTE — PROVIDER CONTACT NOTE (OTHER) - ACTION/TREATMENT ORDERED:
dr Jean reviewed with monitor with me and states will continue to monitor pt.
md states will d/c Accu-Check and coverage.
No intervention at this time. Continue to monitor to patient and if becomes symptomatic call PA.

## 2019-07-05 ENCOUNTER — INPATIENT (INPATIENT)
Facility: HOSPITAL | Age: 69
LOS: 7 days | Discharge: HOME HEALTH | DRG: 949 | End: 2019-07-13
Attending: STUDENT IN AN ORGANIZED HEALTH CARE EDUCATION/TRAINING PROGRAM | Admitting: STUDENT IN AN ORGANIZED HEALTH CARE EDUCATION/TRAINING PROGRAM
Payer: MEDICARE

## 2019-07-05 ENCOUNTER — TRANSCRIPTION ENCOUNTER (OUTPATIENT)
Age: 69
End: 2019-07-05

## 2019-07-05 VITALS
SYSTOLIC BLOOD PRESSURE: 142 MMHG | OXYGEN SATURATION: 97 % | RESPIRATION RATE: 20 BRPM | DIASTOLIC BLOOD PRESSURE: 86 MMHG | HEART RATE: 60 BPM | TEMPERATURE: 98 F

## 2019-07-05 VITALS
SYSTOLIC BLOOD PRESSURE: 161 MMHG | WEIGHT: 153 LBS | HEART RATE: 60 BPM | HEIGHT: 63 IN | RESPIRATION RATE: 15 BRPM | TEMPERATURE: 98 F | DIASTOLIC BLOOD PRESSURE: 83 MMHG | OXYGEN SATURATION: 96 %

## 2019-07-05 DIAGNOSIS — D43.2 NEOPLASM OF UNCERTAIN BEHAVIOR OF BRAIN, UNSPECIFIED: ICD-10-CM

## 2019-07-05 DIAGNOSIS — G56.00 CARPAL TUNNEL SYNDROME, UNSPECIFIED UPPER LIMB: Chronic | ICD-10-CM

## 2019-07-05 LAB
ANION GAP SERPL CALC-SCNC: 11 MMOL/L — SIGNIFICANT CHANGE UP (ref 5–17)
BUN SERPL-MCNC: 26 MG/DL — HIGH (ref 8–20)
CALCIUM SERPL-MCNC: 8.2 MG/DL — LOW (ref 8.6–10.2)
CHLORIDE SERPL-SCNC: 101 MMOL/L — SIGNIFICANT CHANGE UP (ref 98–107)
CO2 SERPL-SCNC: 28 MMOL/L — SIGNIFICANT CHANGE UP (ref 22–29)
CREAT SERPL-MCNC: 0.75 MG/DL — SIGNIFICANT CHANGE UP (ref 0.5–1.3)
GLUCOSE SERPL-MCNC: 115 MG/DL — SIGNIFICANT CHANGE UP (ref 70–115)
HCT VFR BLD CALC: 42.1 % — SIGNIFICANT CHANGE UP (ref 34.5–45)
HGB BLD-MCNC: 13.7 G/DL — SIGNIFICANT CHANGE UP (ref 11.5–15.5)
MCHC RBC-ENTMCNC: 30.7 PG — SIGNIFICANT CHANGE UP (ref 27–34)
MCHC RBC-ENTMCNC: 32.5 GM/DL — SIGNIFICANT CHANGE UP (ref 32–36)
MCV RBC AUTO: 94.4 FL — SIGNIFICANT CHANGE UP (ref 80–100)
PLATELET # BLD AUTO: 244 K/UL — SIGNIFICANT CHANGE UP (ref 150–400)
POTASSIUM SERPL-MCNC: 4.9 MMOL/L — SIGNIFICANT CHANGE UP (ref 3.5–5.3)
POTASSIUM SERPL-SCNC: 4.9 MMOL/L — SIGNIFICANT CHANGE UP (ref 3.5–5.3)
RBC # BLD: 4.46 M/UL — SIGNIFICANT CHANGE UP (ref 3.8–5.2)
RBC # FLD: 14.2 % — SIGNIFICANT CHANGE UP (ref 10.3–14.5)
SODIUM SERPL-SCNC: 140 MMOL/L — SIGNIFICANT CHANGE UP (ref 135–145)
WBC # BLD: 17.86 K/UL — HIGH (ref 3.8–10.5)
WBC # FLD AUTO: 17.86 K/UL — HIGH (ref 3.8–10.5)

## 2019-07-05 PROCEDURE — C1889: CPT

## 2019-07-05 PROCEDURE — C1729: CPT

## 2019-07-05 PROCEDURE — 71045 X-RAY EXAM CHEST 1 VIEW: CPT

## 2019-07-05 PROCEDURE — 84145 PROCALCITONIN (PCT): CPT

## 2019-07-05 PROCEDURE — 80053 COMPREHEN METABOLIC PANEL: CPT

## 2019-07-05 PROCEDURE — 70552 MRI BRAIN STEM W/DYE: CPT

## 2019-07-05 PROCEDURE — 99239 HOSP IP/OBS DSCHRG MGMT >30: CPT

## 2019-07-05 PROCEDURE — 86901 BLOOD TYPING SEROLOGIC RH(D): CPT

## 2019-07-05 PROCEDURE — 97167 OT EVAL HIGH COMPLEX 60 MIN: CPT

## 2019-07-05 PROCEDURE — 70450 CT HEAD/BRAIN W/O DYE: CPT

## 2019-07-05 PROCEDURE — 82962 GLUCOSE BLOOD TEST: CPT

## 2019-07-05 PROCEDURE — 86900 BLOOD TYPING SEROLOGIC ABO: CPT

## 2019-07-05 PROCEDURE — 80048 BASIC METABOLIC PNL TOTAL CA: CPT

## 2019-07-05 PROCEDURE — 97163 PT EVAL HIGH COMPLEX 45 MIN: CPT

## 2019-07-05 PROCEDURE — 83930 ASSAY OF BLOOD OSMOLALITY: CPT

## 2019-07-05 PROCEDURE — C1713: CPT

## 2019-07-05 PROCEDURE — 83735 ASSAY OF MAGNESIUM: CPT

## 2019-07-05 PROCEDURE — 96374 THER/PROPH/DIAG INJ IV PUSH: CPT

## 2019-07-05 PROCEDURE — 97530 THERAPEUTIC ACTIVITIES: CPT

## 2019-07-05 PROCEDURE — 36415 COLL VENOUS BLD VENIPUNCTURE: CPT

## 2019-07-05 PROCEDURE — 83036 HEMOGLOBIN GLYCOSYLATED A1C: CPT

## 2019-07-05 PROCEDURE — 86923 COMPATIBILITY TEST ELECTRIC: CPT

## 2019-07-05 PROCEDURE — 84443 ASSAY THYROID STIM HORMONE: CPT

## 2019-07-05 PROCEDURE — 99285 EMERGENCY DEPT VISIT HI MDM: CPT | Mod: 25

## 2019-07-05 PROCEDURE — 84100 ASSAY OF PHOSPHORUS: CPT

## 2019-07-05 PROCEDURE — 86850 RBC ANTIBODY SCREEN: CPT

## 2019-07-05 PROCEDURE — 93005 ELECTROCARDIOGRAM TRACING: CPT

## 2019-07-05 PROCEDURE — 85027 COMPLETE CBC AUTOMATED: CPT

## 2019-07-05 PROCEDURE — 97116 GAIT TRAINING THERAPY: CPT

## 2019-07-05 PROCEDURE — 88112 CYTOPATH CELL ENHANCE TECH: CPT

## 2019-07-05 PROCEDURE — 85730 THROMBOPLASTIN TIME PARTIAL: CPT

## 2019-07-05 PROCEDURE — 85610 PROTHROMBIN TIME: CPT

## 2019-07-05 PROCEDURE — 96375 TX/PRO/DX INJ NEW DRUG ADDON: CPT

## 2019-07-05 PROCEDURE — 81001 URINALYSIS AUTO W/SCOPE: CPT

## 2019-07-05 RX ORDER — MECLIZINE HCL 12.5 MG
25 TABLET ORAL
Refills: 0 | Status: DISCONTINUED | OUTPATIENT
Start: 2019-07-05 | End: 2019-07-13

## 2019-07-05 RX ORDER — SENNA PLUS 8.6 MG/1
2 TABLET ORAL
Qty: 0 | Refills: 0 | DISCHARGE
Start: 2019-07-05

## 2019-07-05 RX ORDER — DEXAMETHASONE 0.5 MG/5ML
2 ELIXIR ORAL EVERY 8 HOURS
Refills: 0 | Status: COMPLETED | OUTPATIENT
Start: 2019-07-09 | End: 2019-07-11

## 2019-07-05 RX ORDER — DOCUSATE SODIUM 100 MG
100 CAPSULE ORAL
Refills: 0 | Status: DISCONTINUED | OUTPATIENT
Start: 2019-07-05 | End: 2019-07-13

## 2019-07-05 RX ORDER — LEVETIRACETAM 250 MG/1
500 TABLET, FILM COATED ORAL
Refills: 0 | Status: DISCONTINUED | OUTPATIENT
Start: 2019-07-05 | End: 2019-07-13

## 2019-07-05 RX ORDER — POLYETHYLENE GLYCOL 3350 17 G/17G
17 POWDER, FOR SOLUTION ORAL
Qty: 0 | Refills: 0 | DISCHARGE
Start: 2019-07-05

## 2019-07-05 RX ORDER — DEXAMETHASONE 0.5 MG/5ML
ELIXIR ORAL
Refills: 0 | Status: DISCONTINUED | OUTPATIENT
Start: 2019-07-05 | End: 2019-07-13

## 2019-07-05 RX ORDER — DEXAMETHASONE 0.5 MG/5ML
2 ELIXIR ORAL EVERY 6 HOURS
Refills: 0 | Status: COMPLETED | OUTPATIENT
Start: 2019-07-05 | End: 2019-07-08

## 2019-07-05 RX ORDER — ACETAMINOPHEN 500 MG
650 TABLET ORAL EVERY 6 HOURS
Refills: 0 | Status: DISCONTINUED | OUTPATIENT
Start: 2019-07-05 | End: 2019-07-13

## 2019-07-05 RX ORDER — SENNA PLUS 8.6 MG/1
2 TABLET ORAL AT BEDTIME
Refills: 0 | Status: DISCONTINUED | OUTPATIENT
Start: 2019-07-05 | End: 2019-07-13

## 2019-07-05 RX ORDER — DEXAMETHASONE 0.5 MG/5ML
1 ELIXIR ORAL
Qty: 0 | Refills: 0 | DISCHARGE
Start: 2019-07-05

## 2019-07-05 RX ORDER — POLYETHYLENE GLYCOL 3350 17 G/17G
17 POWDER, FOR SOLUTION ORAL DAILY
Refills: 0 | Status: DISCONTINUED | OUTPATIENT
Start: 2019-07-05 | End: 2019-07-13

## 2019-07-05 RX ORDER — DEXAMETHASONE 0.5 MG/5ML
2 ELIXIR ORAL
Refills: 0 | Status: DISCONTINUED | OUTPATIENT
Start: 2019-07-12 | End: 2019-07-13

## 2019-07-05 RX ORDER — DOCUSATE SODIUM 100 MG
1 CAPSULE ORAL
Qty: 0 | Refills: 0 | DISCHARGE
Start: 2019-07-05

## 2019-07-05 RX ORDER — PANTOPRAZOLE SODIUM 20 MG/1
40 TABLET, DELAYED RELEASE ORAL
Refills: 0 | Status: DISCONTINUED | OUTPATIENT
Start: 2019-07-05 | End: 2019-07-13

## 2019-07-05 RX ORDER — DEXAMETHASONE 0.5 MG/5ML
2 ELIXIR ORAL EVERY 6 HOURS
Refills: 0 | Status: DISCONTINUED | OUTPATIENT
Start: 2019-07-05 | End: 2019-07-05

## 2019-07-05 RX ORDER — ONDANSETRON 8 MG/1
4 TABLET, FILM COATED ORAL THREE TIMES A DAY
Refills: 0 | Status: DISCONTINUED | OUTPATIENT
Start: 2019-07-05 | End: 2019-07-13

## 2019-07-05 RX ADMIN — LEVETIRACETAM 500 MILLIGRAM(S): 250 TABLET, FILM COATED ORAL at 17:28

## 2019-07-05 RX ADMIN — Medication 2 MILLIGRAM(S): at 11:44

## 2019-07-05 RX ADMIN — Medication 2 MILLIGRAM(S): at 18:18

## 2019-07-05 RX ADMIN — LEVETIRACETAM 500 MILLIGRAM(S): 250 TABLET, FILM COATED ORAL at 05:35

## 2019-07-05 RX ADMIN — Medication 4 MILLIGRAM(S): at 05:35

## 2019-07-05 RX ADMIN — PANTOPRAZOLE SODIUM 40 MILLIGRAM(S): 20 TABLET, DELAYED RELEASE ORAL at 05:35

## 2019-07-05 RX ADMIN — Medication 100 MILLIGRAM(S): at 22:38

## 2019-07-05 RX ADMIN — Medication 100 MILLIGRAM(S): at 05:35

## 2019-07-05 RX ADMIN — POLYETHYLENE GLYCOL 3350 17 GRAM(S): 17 POWDER, FOR SOLUTION ORAL at 11:44

## 2019-07-05 RX ADMIN — Medication 2 MILLIGRAM(S): at 23:04

## 2019-07-05 RX ADMIN — SENNA PLUS 2 TABLET(S): 8.6 TABLET ORAL at 22:37

## 2019-07-05 RX ADMIN — Medication 100 MILLIGRAM(S): at 11:44

## 2019-07-05 NOTE — PROGRESS NOTE ADULT - REASON FOR ADMISSION
Nausea/vomiting

## 2019-07-05 NOTE — OCCUPATIONAL THERAPY INITIAL EVALUATION ADULT - LEVEL OF INDEPENDENCE: TOILET, REHAB EVAL
minimum assist (75% patients effort)/+LOB observed with change in head position, and when turning toward left

## 2019-07-05 NOTE — OCCUPATIONAL THERAPY INITIAL EVALUATION ADULT - PERSONAL SAFETY AND JUDGMENT, REHAB EVAL
pt has emergent awareness of functional status but anticipatory awareness is inconsistent./at risk behaviors demonstrated at risk behaviors demonstrated/High level attention impaired on eval and pt has emergent awareness of functional status but anticipatory awareness is inconsistent.

## 2019-07-05 NOTE — OCCUPATIONAL THERAPY INITIAL EVALUATION ADULT - NS ASR FOLLOW COMMAND OT EVAL
75% of the time/able to follow multistep instructions/able to follow single-step instructions/delayed processing intermittently

## 2019-07-05 NOTE — H&P ADULT - ATTENDING COMMENTS
Pt. seen 7/6/19 AM.  Agree with documentation above as per resident on call with amendments made as appropriate. Patient medically stable.     No overnight issues.  Slept well, no pain. No BM since monday    Vital Signs Last 24 Hrs  T(C): 36.5 (06 Jul 2019 07:52), Max: 36.5 (05 Jul 2019 20:50)  T(F): 97.7 (06 Jul 2019 07:52), Max: 97.7 (05 Jul 2019 20:50)  HR: 54 (06 Jul 2019 07:52) (54 - 60)  BP: 138/75 (06 Jul 2019 07:52) (138/75 - 161/83)  BP(mean): --  RR: 14 (06 Jul 2019 07:52) (14 - 20)  SpO2: 97% (06 Jul 2019 07:52) (96% - 97%)    Physical Exam findings as amended in H&P above                          14.1   18.22 )-----------( 208      ( 06 Jul 2019 06:05 )             42.1   07-06    139  |  105  |  23  ----------------------------<  94  4.3   |  26  |  0.78    Ca    8.2<L>      06 Jul 2019 06:05    TPro  5.6<L>  /  Alb  2.5<L>  /  TBili  0.3  /  DBili  x   /  AST  23  /  ALT  83<H>  /  AlkPhos  77  07-06    68F with hx of CTS, former smoker quit 7-8 years ago, wrist fracture in right hand, with recent admission from 6/24-6/27 for newly diagnosed NSCLC with brain metastasis, who was re-admitted to [facility] on 6/29/19 for nausea, dizziness and inability to ambulate s/p aspiration drainage of cyst/mass on 7/2/19 being admitted here for rehab with left sided weakness, Gait Instability, ADL impairments and Functional impairments.    Steroid induced leukocytosis    Labs and VS stable.      Cont. current medications

## 2019-07-05 NOTE — DISCHARGE NOTE PROVIDER - CARE PROVIDER_API CALL
Bert Talbot)  Neurosurgery  [facility] [address]  [place] [address]  Phone: [phone]  Fax: [phone]  Follow Up Time:     Pedro De Paz)  HematologyOncology; HospicePalliative Medicine; Internal Medicine  [address] [place] [address]  Phone: [phone]  Fax: [phone]  Follow Up Time:

## 2019-07-05 NOTE — OCCUPATIONAL THERAPY INITIAL EVALUATION ADULT - NS ASR OT EQUIP NEEDS DISCH
pending progress - transport chair for community mobility/rolling walker (5 inch wheels)/bathing/toileting

## 2019-07-05 NOTE — CONSULT NOTE ADULT - CONSULT REQUESTED DATE/TIME
05-Jul-2019 08:30
01-Jul-2019 16:36
02-Jul-2019 14:00
03-Jul-2019 15:26
29-Jun-2019 10:00
29-Jun-2019 15:44

## 2019-07-05 NOTE — H&P ADULT - NSHPLABSRESULTS_GEN_ALL_CORE
13.7   17.86 )-----------( 244      ( 05 Jul 2019 07:37 )             42.1     07-05    140  |  101  |  26.0<H>  ----------------------------<  115  4.9   |  28.0  |  0.75    Ca    8.2<L>      05 Jul 2019 07:37  Phos  2.9     07-04  Mg     2.0     07-04

## 2019-07-05 NOTE — H&P ADULT - ASSESSMENT
ASSESSMENT/PLAN  68F with hx of CTS, former smoker quit 7-8 years ago, wrist fracture in right hand, with recent admission from 6/24-6/27 for newly diagnosed NSCLC with brain metastasis, who was re-admitted to [facility] on 6/29/19 for nausea, dizziness and inability to ambulate s/p aspiration drainage of cyst/mass on 7/2/19 being admitted here for rehab with Gait Instability, ADL impairments and Functional impairments.    COMORBIDITES/ACTIVE MEDICAL ISSUES     Gait Instability, ADL impairments and Functional impairments:   -Start Comprehensive Rehab Program of PT/OT/SLP    NSCLC of the RLL with brain metastasis: s/p cystic drainage  -Continue with dexamethasone taper as per OSH: 2mg q6h for 3 days, then 2mg q8h for 3 days, then 2mg BID until outpatient follow  -Continue with keppra for seizure ppx   -Meclizine prn for dizziness  -Zofran prn for nausea  -Continue with protonix while on steroids  -Follow up with NSGY upon discharge  -Follow up with [facility] as an outpatient    Sinus bradycardia:  -Monitor vitals  -Avoid AVN blockers    Pain Mgmt - Tylenol PRN  GI/Bowel Mgmt -  Continent c/w Colace, Senna,  /Bladder Mgmt - Monitor output    FEN   - Diet - Regular + Thins    - Dysphagia  SLP - evaluation and treatment    Precautions / PROPHYLAXIS:   - Falls, Seizure   - ortho: Weight bearing status: WBAT   - Lungs: Aspiration, Incentive Spirometer   - Pressure injury/Skin: Turn Q2hrs while in bed, OOB to Chair, PT/OT    - DVT: Not on chemical prophylaxis given recent surgery, SCDs, TEDs     MEDICAL PROGNOSIS: GOOD            REHAB POTENTIAL: GOOD             ESTIMATED DISPOSITION: HOME WITH HOME CARE            ELOS: 10-14 Days   EXPECTED THERAPY:     P.T. 1hr/day       O.T. 1hr/day      S.L.P. 1hr/day     P&O Unnecessary     EXP FREQUENCY: 5 days per 7 day period     PRESCREEN COMPARISION:   I have reviewed the prescreen information and I have found no relevant changes between the preadmission screening and my post admission evaluation     RATIONALE FOR INPATIENT ADMISSION - Patient demonstrates the following: (check all that apply)  [X] Medically appropriate for rehabilitation admission  [X] Has attainable rehab goals with an appropriate initial discharge plan  [X] Has rehabilitation potential (expected to make a significant improvement within a reasonable period of time)   [X] Requires close medical management by a rehab physician, rehab nursing care, Hospitalist and comprehensive interdisciplinary team (including PT, OT, & or SLP, Prosthetics and Orthotics) ASSESSMENT/PLAN  68F with hx of CTS, former smoker quit 7-8 years ago, wrist fracture in right hand, with recent admission from 6/24-6/27 for newly diagnosed NSCLC with brain metastasis, who was re-admitted to [facility] on 6/29/19 for nausea, dizziness and inability to ambulate s/p aspiration drainage of cyst/mass on 7/2/19 being admitted here for rehab with left sided weakness, Gait Instability, ADL impairments and Functional impairments.    COMORBIDITES/ACTIVE MEDICAL ISSUES     Gait Instability, ADL impairments and Functional impairments:   -Start Comprehensive Rehab Program of PT/OT/SLP    NSCLC of the RLL with brain metastasis: s/p cystic drainage  -Continue with dexamethasone taper as per OSH: 2mg q6h for 3 days, then 2mg q8h for 3 days, then 2mg BID until outpatient follow  -Continue with keppra for seizure ppx   -Meclizine prn for dizziness  -Zofran prn for nausea  -Continue with protonix while on steroids  -Follow up with NSGY upon discharge  -Follow up with [facility] as an outpatient    Sinus bradycardia:  -Monitor vitals  -Avoid AVN blockers    Pain Mgmt - Tylenol PRN  GI/Bowel Mgmt -  Continent c/w Colace, Senna,  /Bladder Mgmt - Monitor output    FEN   - Diet - Regular + Thins    - Dysphagia  SLP - evaluation and treatment    Precautions / PROPHYLAXIS:   - Falls, Seizure   - ortho: Weight bearing status: WBAT   - Lungs: Aspiration, Incentive Spirometer   - Pressure injury/Skin: Turn Q2hrs while in bed, OOB to Chair, PT/OT    - DVT: Not on chemical prophylaxis given recent surgery, SCDs, TEDs     MEDICAL PROGNOSIS: GOOD            REHAB POTENTIAL: GOOD             ESTIMATED DISPOSITION: HOME WITH HOME CARE            ELOS: 10-14 Days   EXPECTED THERAPY:     P.T. 1hr/day       O.T. 1hr/day      S.L.P. 1hr/day     P&O Unnecessary     EXP FREQUENCY: 5 days per 7 day period     PRESCREEN COMPARISION:   I have reviewed the prescreen information and I have found no relevant changes between the preadmission screening and my post admission evaluation     RATIONALE FOR INPATIENT ADMISSION - Patient demonstrates the following: (check all that apply)  [X] Medically appropriate for rehabilitation admission  [X] Has attainable rehab goals with an appropriate initial discharge plan  [X] Has rehabilitation potential (expected to make a significant improvement within a reasonable period of time)   [X] Requires close medical management by a rehab physician, rehab nursing care, Hospitalist and comprehensive interdisciplinary team (including PT, OT, & or SLP, Prosthetics and Orthotics)

## 2019-07-05 NOTE — DISCHARGE NOTE PROVIDER - NSDCCPCAREPLAN_GEN_ALL_CORE_FT
PRINCIPAL DISCHARGE DIAGNOSIS  Diagnosis: Brain mass  Assessment and Plan of Treatment: s/p cyst aspiration   f/u pathology   c/w Decadron taper plus Keppra   f/u oncology as an outpatient      SECONDARY DISCHARGE DIAGNOSES  Diagnosis: Lung tumor  Assessment and Plan of Treatment: f/u oncology

## 2019-07-05 NOTE — DISCHARGE NOTE PROVIDER - CARE PROVIDERS DIRECT ADDRESSES
[email or web address]

## 2019-07-05 NOTE — OCCUPATIONAL THERAPY INITIAL EVALUATION ADULT - LEVEL OF INDEPENDENCE:TOILET, OT EVAL
assist needed to manage clothing or help pt maintain balance while managing clothing/moderate assist (50% patients effort)

## 2019-07-05 NOTE — H&P ADULT - HISTORY OF PRESENT ILLNESS
68F with hx of CTS, former smoker quit 7-8 years ago, wrist fracture in right hand, with recent admission from 6/24-6/27 for newly diagnosed NSCLC with brain metastasis, who was re-admitted to [facility] on 6/29/19 for nausea, dizziness and inability to ambulate s/p aspiration drainage of cyst/mass on 7/2/19 being admitted here for rehab. Of note, patient was admitted to [facility] from 6/24/19-6/28/19 for difficulty with ambulation. Reports that she felt like she was wobbly and was going to fall down. Imaging demonstrated cystic necrotic hemorrhagic mass with nodularity in the right posterior frontal parietal region with involvement of the splenium of the corpus callosum and crossing the midline as well as positive extensive vasogenic edema. Also seen to have additional lesions involving the right frontal and left occipital lobe. She was also found to have RLL lung mass s/p lung biopsy on 6/26 and consistent with NSCLC. Brain lesions thought likely due to to brain metastasis given presentation per radiation oncology. NSGY was consulted and patient started on keppra for seizure ppx and steroids. Patient was medically stabilized and discharged home. However, she returned on 6/29 with complaints of nausea, dizziness and inability to ambulate. She underwent an aspiration of cyst of brain with CT guidance Dr. Bert Talbot 7/2/2019 with 20cc of hemorrhagic cyst fluid removed.     Hospital course complicated sinus bradycardia. Patient medically stabilized and admitted here for rehab.

## 2019-07-05 NOTE — DISCHARGE NOTE NURSING/CASE MANAGEMENT/SOCIAL WORK - NSDCDPATPORTLINK_GEN_ALL_CORE
You can access the [facility], offered by [facility], by registering with the following website: [email or web address] Medical Center

## 2019-07-05 NOTE — OCCUPATIONAL THERAPY INITIAL EVALUATION ADULT - ADDITIONAL COMMENTS
Pt lives in private home with spouse .  There are   steps to enter, bilateral HR.  Once inside there are no stairs to manage.  Prior to initial admission,  pt was independent with functional mobility and self care.   Pt drives.  Pt has RW, commode and shower seat.

## 2019-07-05 NOTE — CONSULT NOTE ADULT - SUBJECTIVE AND OBJECTIVE BOX
68y F was re-admitted on 06-29, after recent admission 6/24 - 6/27 for new right brain mass with vasogenic edema. Patient was also found to have a RLL lung mass and underwent a  lung biopsy. Patient was discharged home with outpatient follow up. However she returned on 6/29 with complaints  of nausea, dizziness, & inability to ambulate.      Imaging showed (reviewed):  HEAD CT - No acute findings  CAP CT - No acute findings  C SPINE CT - No acute findings    REVIEW OF SYSTEMS  Constitutional - No fever, No weight loss, No fatigue  HEENT - No eye pain, No visual disturbances, No difficulty hearing, No tinnitus, No vertigo, No neck pain  Respiratory - No cough, No wheezing, No shortness of breath  Cardiovascular - No chest pain, No palpitations  Gastrointestinal - No abdominal pain, No nausea, No vomiting, No diarrhea, No constipation  Genitourinary - No dysuria, No frequency, No hematuria, No incontinence  Neurological - No headaches, No memory loss, No loss of strength, No numbness, No tremors  Skin - No itching, No rashes, No lesions   Endocrine - No temperature intolerance  Musculoskeletal - No joint pain, No joint swelling, No muscle pain  Psychiatric - No depression, No anxiety    VITALS  T(C): 36.7 (07-05-19 @ 07:34), Max: 36.7 (07-05-19 @ 00:52)  HR: 60 (07-05-19 @ 07:34) (51 - 64)  BP: 157/92 (07-05-19 @ 07:34) (132/78 - 168/79)  RR: 20 (07-05-19 @ 07:34) (16 - 20)  SpO2: 96% (07-05-19 @ 00:52) (96% - 98%)  Wt(kg): --    PAST MEDICAL & SURGICAL HISTORY  Brain tumor  Lung tumor  No pertinent past medical history  Carpal tunnel syndrome      SOCIAL HISTORY  Smoking - Denied  EtOH - Denied   Drugs - Denied    FUNCTIONAL HISTORY  Lives   Independent    CURRENT FUNCTIONAL STATUS      FAMILY HISTORY   No pertinent family history in first degree relatives      RECENT LABS/IMAGING  CBC Full  -  ( 05 Jul 2019 07:37 )  WBC Count : 17.86 K/uL  RBC Count : 4.46 M/uL  Hemoglobin : 13.7 g/dL  Hematocrit : 42.1 %  Platelet Count - Automated : 244 K/uL  Mean Cell Volume : 94.4 fl  Mean Cell Hemoglobin : 30.7 pg  Mean Cell Hemoglobin Concentration : 32.5 gm/dL  Auto Neutrophil # : x  Auto Lymphocyte # : x  Auto Monocyte # : x  Auto Eosinophil # : x  Auto Basophil # : x  Auto Neutrophil % : x  Auto Lymphocyte % : x  Auto Monocyte % : x  Auto Eosinophil % : x  Auto Basophil % : x    07-05    140  |  101  |  26.0<H>  ----------------------------<  115  4.9   |  28.0  |  0.75    Ca    8.2<L>      05 Jul 2019 07:37  Phos  2.9     07-04  Mg     2.0     07-04          ALLERGIES  Ensure TID RDOK (Unknown)  No Known Allergies      MEDICATIONS   dexamethasone  Injectable 4 milliGRAM(s) IV Push every 6 hours  docusate sodium 100 milliGRAM(s) Oral three times a day  levETIRAcetam 500 milliGRAM(s) Oral two times a day  meclizine 25 milliGRAM(s) Oral four times a day PRN  ondansetron Injectable 4 milliGRAM(s) IV Push once PRN  pantoprazole    Tablet 40 milliGRAM(s) Oral before breakfast  polyethylene glycol 3350 17 Gram(s) Oral daily  senna 2 Tablet(s) Oral at bedtime  sterile water 1000 milliLiter(s) IV Continuous <Continuous>      ----------------------------------------------------------------------------------------  PHYSICAL EXAM  Constitutional - NAD, Comfortable  HEENT - NCAT, EOMI  Neck - Supple, No limited ROM  Chest - Breathing comfortably, No wheezing  Cardiovascular - S1S2   Abdomen - Soft   Extremities - No C/C/E, No calf tenderness   Neurologic Exam -                    Cognitive - Awake, Alert, AAO to self, place, date, year, situation     Communication - Fluent, No dysarthria     Cranial Nerves - CN 2-12 intact     Motor - No focal deficits                    LEFT    UE - ShAB 5/5, EF 5/5, EE 5/5, WE 5/5,  5/5                    RIGHT UE - ShAB 5/5, EF 5/5, EE 5/5, WE 5/5,  5/5                    LEFT    LE - HF 5/5, KE 5/5, DF 5/5, PF 5/5                    RIGHT LE - HF 5/5, KE 5/5, DF 5/5, PF 5/5        Sensory - Intact to LT     Reflexes - DTR Intact, No primitive reflexive     Coordination - FTN intact     OculoVestibular - No saccades, No nystagmus, VOR         Balance - WNL Static  Psychiatric - Mood stable, Affect WNL  ----------------------------------------------------------------------------------------  ASSESSMENT/PLAN 67yo F was re-admitted on 06-29, after recent admission 6/24 - 6/27 for new right brain mass with vasogenic edema (see below for imaging results). Patient was also found to have a RLL lung mass and underwent a  lung biopsy during prior admission. Patient was discharged home with outpatient follow up. However she returned on 6/29 with complaints of nausea, dizziness, & inability to ambulate. Pt underwent an aspiration of cyst of brain with computed tomography guidance by Dr. Bert Talbot on 6/2/19 with 20 cc of hemorrhagic cyst fluid removed    Imaging showed (Images reviewed):  6/2 CT Head: Study post interval right frontal paula hole for biopsy of right   paramedian frontal lobe/transcallosal lesion with expected evolving   postprocedural changes, as above.    6/2 MRI Brain: 1.  Redemonstration of a mass with solid and cystic/necrotic components in the right posterior frontal/parietal region.   2.  Two additional centrally necrotic peripherally enhancing lesions in   the right frontal lobe and left occipital lobe.  3.  A third enhancing lesion measuring 3 mm along the left frontal   convexity may be parenchymal or leptomeningeal. This was not apparent on   the prior study which may be due to differences in technique.  4.  New area of FLAIR hyperintensity in the medial left cerebellum,   without associated enhancement, which is incompletely characterized.    6/24 MRI Head: A cystic/necrotic, hemorrhagic mass with enhancing nodularity in the right posterior frontal and parietal region, with involvement of the   splenium of the corpus callosum and crossing the midline. Extensive   T2/FLAIR hyperintensity surrounding this lesion suggesting vasogenic   edema and/or nonenhancing tumor. Two smaller enhancing lesions in the   right frontal lobe and left occipital lobe. Findings suggest primary   malignancy such as GBM versus metastatic disease.    6/24 CT Head: A 4.6 cm cystic/necrotic mass with nodular components in the right   posterior frontal and parietal region with involvement of the right splenium of the corpus callosum. Extensive surrounding vasogenic edema. Right to left midline shift measuring 2 mm.    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 LEFT lung clear. Abdominal pelvic viscera aside from ptotic gallbladder unremarkable.    REVIEW OF SYSTEMS  Constitutional - No fever, No weight loss, + fatigue  HEENT -  No visual disturbances,  No neck pain  Gastrointestinal - No abdominal pain, No diarrhea, + constipation  Neurological - No headaches, No memory loss, No loss of strength, + numbness, No tremors  Skin - No itching, No rashes,   Musculoskeletal - No joint pain, No joint swelling, No muscle pain  Psychiatric - No depression, No anxiety    VITALS  T(C): 36.7 (07-05-19 @ 07:34), Max: 36.7 (07-05-19 @ 00:52)  HR: 60 (07-05-19 @ 07:34) (51 - 64)  BP: 157/92 (07-05-19 @ 07:34) (132/78 - 168/79)  RR: 20 (07-05-19 @ 07:34) (16 - 20)  SpO2: 96% (07-05-19 @ 00:52) (96% - 98%)  Wt(kg): --    PAST MEDICAL & SURGICAL HISTORY  Brain tumor  Lung tumor  Carpal tunnel syndrome    SOCIAL HISTORY  Smoking - long history of smoking; quit few years prior; previously smoking 1/2 - 1 ppd x20 years  EtOH - social etoh use  Drugs - Denied    FUNCTIONAL HISTORY  Lives with her   Home: 2 floor private house (Bedroom and bathroom available on main floor)  Ambulation: Independent w/o AD  ADLs: independent  Prior Home Equipment: cammode, bath chair, walker    CURRENT FUNCTIONAL STATUS  PT6/3  Bed Mobility: Rolling/Turning:     · Level of [place]	moderate assist (50% patients effort); as per Nesha LO	    Bed Mobility: Scooting/Bridging:     · Level of [place]	moderate assist (50% patients effort); as per Nesha LO	  · Physical Assist/Nonphysical Assist	2 person assist	    Bed Mobility: Supine to Sit:     · Level of [place]	moderate assist (50% patients effort); as per Nesha LO	  · Physical Assist/Nonphysical Assist	2 person assist	    Bed Mobility Analysis:     · Bed Mobility Limitations	decreased ability to use arms for pushing/pulling; decreased ability to use legs for bridging/pushing	  · Impairments Contributing to Impaired Bed Mobility	impaired balance; decreased flexibility; decreased strength; impaired coordination; impaired motor control	    Transfer: Sit to Stand:     · Level of [place]	minimum assist (75% patients effort)	  · Physical Assist/Nonphysical Assist	1 person assist; nonverbal cues (demo/gestures); verbal cues	  · Weight-Bearing Restrictions	full weight-bearing	  · Assistive Device	rolling walker	    Transfer: Stand to Sit:     · Level of [place]	minimum assist (75% patients effort)	  · Physical Assist/Nonphysical Assist	verbal cues; nonverbal cues (demo/gestures)	  · Weight-Bearing Restrictions	full weight-bearing	  · Assistive Device	rolling walker	    Sit/Stand Transfer Safety Analysis:     · Transfer Safety Concerns Noted	decreased weight-shifting ability; to the right in frontal plane	  · Impairments Contributing to Impaired Transfers	impaired balance; impaired coordination; impaired motor control; decreased strength; active on the Left UE/LE	    Gait Skills:     · Level of [place]	minimum assist (75% patients effort)	  · Physical Assist/Nonphysical Assist	nonverbal cues (demo/gestures); 1 person assist; verbal cues	  · Weight-Bearing Restrictions	full weight-bearing	  · Assistive Device	rolling walker	  · Gait Distance	10 feet	    Gait Analysis:     · Gait Pattern Used	2-point gait	  · Gait Deviations Noted	decreased mack; decreased velocity of limb motion; decreased step length; on the left LE	  · Impairments Contributing to Gait Deviations	impaired balance; impaired coordination; impaired motor control; decreased strength; (+) dysmetria	    Stair Negotiation:     · Level of [place]	NA, safety	    Balance Skills Assessment:     · Sitting Balance: Static	good minus	  · Sitting Balance: Dynamic	fair plus	  · Sit-to-Stand Balance	fair minus	  · Standing Balance: Static	fair balance	  · Standing Balance: Dynamic	fair minus	  · Systems Impairment Contributing to Balance Disturbance	musculoskeletal; neuromuscular; somatosensory	  · Identified Impairments Contributing to Balance Disturbance	decreased strength; impaired motor control; impaired coordination	    FAMILY HISTORY   No pertinent family history in first degree relatives    RECENT LABS/IMAGING  CBC Full  -  ( 05 Jul 2019 07:37 )  WBC Count : 17.86 K/uL  RBC Count : 4.46 M/uL  Hemoglobin : 13.7 g/dL  Hematocrit : 42.1 %  Platelet Count - Automated : 244 K/uL  Mean Cell Volume : 94.4 fl  Mean Cell Hemoglobin : 30.7 pg  Mean Cell Hemoglobin Concentration : 32.5 gm/dL  Auto Neutrophil # : x  Auto Lymphocyte # : x  Auto Monocyte # : x  Auto Eosinophil # : x  Auto Basophil # : x  Auto Neutrophil % : x  Auto Lymphocyte % : x  Auto Monocyte % : x  Auto Eosinophil % : x  Auto Basophil % : x    07-05    140  |  101  |  26.0<H>  ----------------------------<  115  4.9   |  28.0  |  0.75    Ca    8.2<L>      05 Jul 2019 07:37  Phos  2.9     07-04  Mg     2.0     07-04      ALLERGIES  Ensure TID RDOK (Unknown)  No Known Allergies    MEDICATIONS   dexamethasone  Injectable 4 milliGRAM(s) IV Push every 6 hours  docusate sodium 100 milliGRAM(s) Oral three times a day  levETIRAcetam 500 milliGRAM(s) Oral two times a day  meclizine 25 milliGRAM(s) Oral four times a day PRN  ondansetron Injectable 4 milliGRAM(s) IV Push once PRN  pantoprazole    Tablet 40 milliGRAM(s) Oral before breakfast  polyethylene glycol 3350 17 Gram(s) Oral daily  senna 2 Tablet(s) Oral at bedtime  sterile water 1000 milliLiter(s) IV Continuous <Continuous>    ----------------------------------------------------------------------------------------  PHYSICAL EXAM  Constitutional - NAD, Comfortable  HEENT - NCAT, EOMI  Neck -  No limited ROM  Chest - Breathing comfortably, No wheezing  Cardiovascular - S1S2   Abdomen - Soft   Extremities - No C/C/E, No calf tenderness   Neurologic Exam -                    Cognitive - Awake, Alert, Oriented x3     Communication - Fluent, No dysarthria appreciated     Cranial Nerves - CN 2-12 grossly intact     Motor - No focal deficits                    LEFT    UE - ShAB 5/5, EF 5/5, EE 5/5, WE 5/5,  5/5                    RIGHT UE - ShAB 5/5, EF 5/5, EE 5/5, WE 5/5,  5/5                    LEFT    LE - HF 4+/5, KE 4+/5, DF 5/5, PF 5/5                    RIGHT LE - HF 4+/5, KE 4+/5 DF 5/5, PF 5/5        Sensory - Intact to LT except decreased sensation to light touch plantar aspect of left foot     Reflexes - 1+ b/l LE, 2+ b/l UE,      Coordination - FTN intact     OculoVestibular - No nystagmus,      Psychiatric - Mood stable, Affect WNL  ----------------------------------------------------------------------------------------  ASSESSMENT/PLAN  ----------------------------------------------------------------------------------------  PHYSICAL EXAM  Constitutional - NAD, Comfortable  HEENT - NCAT, EOMI  Neck - Supple, No limited ROM  Chest - Breathing comfortably, No wheezing  Cardiovascular - S1S2   Abdomen - Soft   Extremities - No C/C/E, No calf tenderness   Neurologic Exam -                    Cognitive - Awake, Alert, AAO to self, place, date, year, situation     Communication - Fluent, No dysarthria     Cranial Nerves - CN 2-12 intact     Motor - No focal deficits                    LEFT    UE - ShAB 5/5, EF 5/5, EE 5/5, WE 5/5,  5/5                    RIGHT UE - ShAB 5/5, EF 5/5, EE 5/5, WE 5/5,  5/5                    LEFT    LE - HF 5/5, KE 5/5, DF 5/5, PF 5/5                    RIGHT LE - HF 5/5, KE 5/5, DF 5/5, PF 5/5        Sensory - Intact to LT     Reflexes - DTR Intact, No primitive reflexive     Coordination - FTN intact     OculoVestibular - No saccades, No nystagmus, VOR         Balance - WNL Static  Psychiatric - Mood stable, Affect WNL  ----------------------------------------------------------------------------------------  ASSESSMENT/PLAN  69 yo Female with functional deficits after new brain mass, right lower lung mass s/p aspiration of cyst of brain mass  Pain - no active complaints  Constipation - colace, miralax, senna  Seizure ppx, Brain mass - as per neurosurgery recommendations, Decadron, Keppra,  Dizziness - meclizine  Nausea - zofran  DVT PPX - SCDs  Diet - regular  Rehab -    Recommend ACUTE inpatient rehabilitation for the functional deficits consisting of 3 hours of therapy/day & 24 hour RN/daily PMR physician for comorbid medical management. Will continue to follow for ongoing rehab needs and recommendations.    Continue bedside therapy (PT/OT) as well as OOB throughout the day with mobilization by staff to maintain cardiopulmonary function and prevention of secondary complications related to debility. 67yo F was re-admitted on 06-29, after recent admission 6/24 - 6/27 for new right brain mass with vasogenic edema (see below for imaging results). Patient was also found to have a RLL lung mass and underwent a  lung biopsy during prior admission. Patient was discharged home with outpatient follow up. However she returned on 6/29 with complaints of nausea, dizziness, & inability to ambulate. Pt underwent an aspiration of cyst of brain with computed tomography guidance by Dr. Bert Talbot on 6/2/19 with 20 cc of hemorrhagic cyst fluid removed    Imaging showed (Images reviewed):  6/2 CT Head: Study post interval right frontal paula hole for biopsy of right   paramedian frontal lobe/transcallosal lesion with expected evolving   postprocedural changes, as above.    6/2 MRI Brain: 1.  Redemonstration of a mass with solid and cystic/necrotic components in the right posterior frontal/parietal region.   2.  Two additional centrally necrotic peripherally enhancing lesions in   the right frontal lobe and left occipital lobe.  3.  A third enhancing lesion measuring 3 mm along the left frontal   convexity may be parenchymal or leptomeningeal. This was not apparent on   the prior study which may be due to differences in technique.  4.  New area of FLAIR hyperintensity in the medial left cerebellum,   without associated enhancement, which is incompletely characterized.    6/24 MRI Head: A cystic/necrotic, hemorrhagic mass with enhancing nodularity in the right posterior frontal and parietal region, with involvement of the   splenium of the corpus callosum and crossing the midline. Extensive   T2/FLAIR hyperintensity surrounding this lesion suggesting vasogenic   edema and/or nonenhancing tumor. Two smaller enhancing lesions in the   right frontal lobe and left occipital lobe. Findings suggest primary   malignancy such as GBM versus metastatic disease.    6/24 CT Head: A 4.6 cm cystic/necrotic mass with nodular components in the right   posterior frontal and parietal region with involvement of the right splenium of the corpus callosum. Extensive surrounding vasogenic edema. Right to left midline shift measuring 2 mm.    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 LEFT lung clear. Abdominal pelvic viscera aside from ptotic gallbladder unremarkable.    REVIEW OF SYSTEMS  Constitutional - No fever, No weight loss, + fatigue  HEENT -  No visual disturbances,  No neck pain  Gastrointestinal - No abdominal pain, No diarrhea, + constipation  Neurological - No headaches, No memory loss, No loss of strength, + numbness, No tremors  Skin - No itching, No rashes,   Musculoskeletal - No joint pain, No joint swelling, No muscle pain  Psychiatric - No depression, No anxiety    VITALS  T(C): 36.7 (07-05-19 @ 07:34), Max: 36.7 (07-05-19 @ 00:52)  HR: 60 (07-05-19 @ 07:34) (51 - 64)  BP: 157/92 (07-05-19 @ 07:34) (132/78 - 168/79)  RR: 20 (07-05-19 @ 07:34) (16 - 20)  SpO2: 96% (07-05-19 @ 00:52) (96% - 98%)  Wt(kg): --    PAST MEDICAL & SURGICAL HISTORY  Brain tumor  Lung tumor  Carpal tunnel syndrome    SOCIAL HISTORY  Smoking - long history of smoking; quit few years prior; previously smoking 1/2 - 1 ppd x20 years  EtOH - social etoh use  Drugs - Denied    FUNCTIONAL HISTORY  Lives with her   Home: 2 floor private house (Bedroom and bathroom available on main floor)  Ambulation: Independent w/o AD  ADLs: independent  Prior Home Equipment: cammode, bath chair, walker    CURRENT FUNCTIONAL STATUS  PT6/3  Bed Mobility: Rolling/Turning:     · Level of [place]	moderate assist (50% patients effort); as per Nesha LO	    Bed Mobility: Scooting/Bridging:     · Level of [place]	moderate assist (50% patients effort); as per Nesha LO	  · Physical Assist/Nonphysical Assist	2 person assist	    Bed Mobility: Supine to Sit:     · Level of [place]	moderate assist (50% patients effort); as per Nesha LO	  · Physical Assist/Nonphysical Assist	2 person assist	    Bed Mobility Analysis:     · Bed Mobility Limitations	decreased ability to use arms for pushing/pulling; decreased ability to use legs for bridging/pushing	  · Impairments Contributing to Impaired Bed Mobility	impaired balance; decreased flexibility; decreased strength; impaired coordination; impaired motor control	    Transfer: Sit to Stand:     · Level of [place]	minimum assist (75% patients effort)	  · Physical Assist/Nonphysical Assist	1 person assist; nonverbal cues (demo/gestures); verbal cues	  · Weight-Bearing Restrictions	full weight-bearing	  · Assistive Device	rolling walker	    Transfer: Stand to Sit:     · Level of [place]	minimum assist (75% patients effort)	  · Physical Assist/Nonphysical Assist	verbal cues; nonverbal cues (demo/gestures)	  · Weight-Bearing Restrictions	full weight-bearing	  · Assistive Device	rolling walker	    Sit/Stand Transfer Safety Analysis:     · Transfer Safety Concerns Noted	decreased weight-shifting ability; to the right in frontal plane	  · Impairments Contributing to Impaired Transfers	impaired balance; impaired coordination; impaired motor control; decreased strength; active on the Left UE/LE	    Gait Skills:     · Level of [place]	minimum assist (75% patients effort)	  · Physical Assist/Nonphysical Assist	nonverbal cues (demo/gestures); 1 person assist; verbal cues	  · Weight-Bearing Restrictions	full weight-bearing	  · Assistive Device	rolling walker	  · Gait Distance	10 feet	    Gait Analysis:     · Gait Pattern Used	2-point gait	  · Gait Deviations Noted	decreased mack; decreased velocity of limb motion; decreased step length; on the left LE	  · Impairments Contributing to Gait Deviations	impaired balance; impaired coordination; impaired motor control; decreased strength; (+) dysmetria	    Stair Negotiation:     · Level of [place]	NA, safety	    Balance Skills Assessment:     · Sitting Balance: Static	good minus	  · Sitting Balance: Dynamic	fair plus	  · Sit-to-Stand Balance	fair minus	  · Standing Balance: Static	fair balance	  · Standing Balance: Dynamic	fair minus	  · Systems Impairment Contributing to Balance Disturbance	musculoskeletal; neuromuscular; somatosensory	  · Identified Impairments Contributing to Balance Disturbance	decreased strength; impaired motor control; impaired coordination	      FAMILY HISTORY   No pertinent family history in first degree relatives    RECENT LABS/IMAGING  CBC Full  -  ( 05 Jul 2019 07:37 )  WBC Count : 17.86 K/uL  RBC Count : 4.46 M/uL  Hemoglobin : 13.7 g/dL  Hematocrit : 42.1 %  Platelet Count - Automated : 244 K/uL  Mean Cell Volume : 94.4 fl  Mean Cell Hemoglobin : 30.7 pg  Mean Cell Hemoglobin Concentration : 32.5 gm/dL  Auto Neutrophil # : x  Auto Lymphocyte # : x  Auto Monocyte # : x  Auto Eosinophil # : x  Auto Basophil # : x  Auto Neutrophil % : x  Auto Lymphocyte % : x  Auto Monocyte % : x  Auto Eosinophil % : x  Auto Basophil % : x    07-05    140  |  101  |  26.0<H>  ----------------------------<  115  4.9   |  28.0  |  0.75    Ca    8.2<L>      05 Jul 2019 07:37  Phos  2.9     07-04  Mg     2.0     07-04      ALLERGIES  Ensure TID RDOK (Unknown)  No Known Allergies    MEDICATIONS   dexamethasone  Injectable 4 milliGRAM(s) IV Push every 6 hours  docusate sodium 100 milliGRAM(s) Oral three times a day  levETIRAcetam 500 milliGRAM(s) Oral two times a day  meclizine 25 milliGRAM(s) Oral four times a day PRN  ondansetron Injectable 4 milliGRAM(s) IV Push once PRN  pantoprazole    Tablet 40 milliGRAM(s) Oral before breakfast  polyethylene glycol 3350 17 Gram(s) Oral daily  senna 2 Tablet(s) Oral at bedtime  sterile water 1000 milliLiter(s) IV Continuous <Continuous>    ----------------------------------------------------------------------------------------  PHYSICAL EXAM  Constitutional - NAD, Comfortable  HEENT - NCAT, EOMI  Neck -  No limited ROM  Chest - Breathing comfortably, No wheezing  Cardiovascular - S1S2   Abdomen - Soft   Extremities - No C/C/E, No calf tenderness   Neurologic Exam -                    Cognitive - Awake, Alert, Oriented x3     Communication - Fluent, No dysarthria appreciated     Cranial Nerves - CN 2-12 grossly intact     Motor -                     LEFT    UE - ShAB 5/5, EF 5/5, EE 5/5, WE 5/5,  5/5                    RIGHT UE - ShAB 5/5, EF 5/5, EE 5/5, WE 5/5,  5/5                    LEFT    LE - HF 4+/5, KE 4+/5, DF 5/5, PF 5/5                    RIGHT LE - HF 4+/5, KE 4+/5 DF 5/5, PF 5/5        Sensory - Intact to LT except decreased sensation to light touch plantar aspect of left foot     Reflexes - 1+ b/l LE, 2+ b/l UE,      Coordination - FTN intact     OculoVestibular - No nystagmus    Psychiatric - Mood stable, Affect WNL  ----------------------------------------------------------------------------------------  ASSESSMENT/PLAN  67 yo Female with functional deficits after new brain mass, right lower lung mass s/p aspiration of cyst of brain mass  Pain - no active complaints; no active pain meds at this time  Constipation - colace, miralax, senna  Seizure ppx, Brain mass - as per neurosurgery recommendations, Decadron, Keppra,  Dizziness - meclizine  Nausea - zofran  DVT PPX - SCDs  Diet - regular  Rehab -  Recommend ACUTE inpatient rehabilitation for the functional deficits consisting of 3 hours of therapy/day & 24 hour RN/daily PMR physician for comorbid medical management. Will continue to follow for ongoing rehab needs and recommendations.    Continue bedside therapy (PT/OT) as well as OOB throughout the day with mobilization by staff to maintain cardiopulmonary function and prevention of secondary complications related to debility.

## 2019-07-05 NOTE — DISCHARGE NOTE NURSING/CASE MANAGEMENT/SOCIAL WORK - NSDCFUADDAPPT_GEN_ALL_CORE_FT
Discharging to [facility], [address], [place] [address], Phone: [phone]

## 2019-07-05 NOTE — CONSULT NOTE ADULT - ATTENDING COMMENTS
Doing well immediately post op.  Neuro monitoring in SICU.
Patient seen and examined, discussed patient with Dr. Calabrese and agree with recommendations.    Spoke with hospitalist, SW and patient about AR recommendation. Based on the functional status of 7/3, and given will likely not start cancer treatment for  2 weeks, would benefit from AR program. Therefore, recommend AR.    Spoke to PT afterwards on function and is making progress. Would still recommend AR at this time and ASAP**.     Rehab - Recommend ACUTE inpatient rehabilitation for the functional deficits consisting of 3 hours of therapy/day & 24 hour RN/daily PMR physician for comorbid medical management. Will continue to follow for ongoing rehab needs and recommendations. Patient will be able to tolerate 3 hours a day.    Continue bedside therapy as well as OOB throughout the day with mobilization throughout the day with staff to maintain cardiopulmonary function and prevention of secondary complications related to debility.

## 2019-07-05 NOTE — OCCUPATIONAL THERAPY INITIAL EVALUATION ADULT - COORDINATION ASSESSED, REHAB EVAL
grossly intact but slight increase in time/effort observed/finger to nose finger to nose/grossly intact but slight increase in time/effort observed

## 2019-07-05 NOTE — H&P ADULT - NSHPPHYSICALEXAM_GEN_ALL_CORE
Physical Exam  T(C): 36.7 (07-05-19 @ 07:34), Max: 36.7 (07-05-19 @ 00:52)  HR: 60 (07-05-19 @ 07:34) (51 - 62)  BP: 157/92 (07-05-19 @ 07:34) (150/78 - 168/79)  RR: 20 (07-05-19 @ 07:34) (18 - 20)  SpO2: 96% (07-05-19 @ 00:52) (96% - 98%)    Constitutional - NAD, Comfortable  HEENT - NCAT, EOMI  Neck - Supple  Chest - CTA bilaterally  Cardiovascular - RRR, S1S2  Abdomen - BS+, Soft, NTND  Extremities - No C/C/E, No calf tenderness   Neurologic Exam -                    Cognitive - Awake, Alert, Oriented to self, place, date, year, situation     Communication - Fluent, No dysarthria     Attention - able to recite months of the year backward     Naming/Abstract -      Memory - able to recall 3/3 items immediate and -/3 after 3 minutes     Cranial Nerves - CN 2-12 grossly intact     Motor -                     LEFT    UE - ShAB 5/5, EF 5/5, EE 5/5, WE 5/5,  5/5                    RIGHT UE - ShAB 5/5, EF 5/5, EE 5/5, WE 5/5,  5/5                    LEFT    LE - HF 5/5, KE 5/5, DF 5/5, PF 5/5                    RIGHT LE - HF 5/5, KE 5/5, DF 5/5, PF 5/5        Sensory - Intact to light touch diffusely     Vestibulo-ocular - No nystagmus, no saccades     Reflexes - DTR intact and symmetrical, Negative Coffey's bilaterally, Negative Babinski's bilaterally      Coordination - Finger-to-nose intact bilaterally   Psychiatric - Affect WNL  Skin - Vital Signs Last 24 Hrs  T(C): 36.4 (05 Jul 2019 16:50), Max: 36.7 (05 Jul 2019 00:52)  T(F): 97.5 (05 Jul 2019 16:50), Max: 98.1 (05 Jul 2019 00:52)  HR: 60 (05 Jul 2019 16:50) (51 - 61)  BP: 142/86 (05 Jul 2019 16:50) (142/86 - 168/79)  BP(mean): --  RR: 20 (05 Jul 2019 16:50) (18 - 20)  SpO2: 97% (05 Jul 2019 16:50) (96% - 97%)    Constitutional - NAD, Comfortable  HEENT - right cranial incision with dressing  Neck - Supple  Chest - CTA bilaterally  Cardiovascular - RRR, S1S2  Abdomen - BS+, Soft, NTND  Extremities - No C/C/E, No calf tenderness   Neurologic Exam -                    Cognitive - Awake, Alert, Oriented to self, place, date, year, situation     Communication - Fluent, No dysarthria     Attention - able to recite months of the year backward     Memory - able to recall 3/3 items immediate and -/3 after 3 minutes     Cranial Nerves - CN 2-12 grossly intact     Motor - left arm and leg 4+/5 otherwise 5/5 throughout        Sensory - Intact to light touch diffusely     Reflexes - DTR intact and symmetrical      Coordination - Finger-to-nose intact bilaterally   Psychiatric - Affect WNL  Skin - ecchymosis bilateral antecubital fossa Vital Signs Last 24 Hrs  T(C): 36.4 (05 Jul 2019 16:50), Max: 36.7 (05 Jul 2019 00:52)  T(F): 97.5 (05 Jul 2019 16:50), Max: 98.1 (05 Jul 2019 00:52)  HR: 60 (05 Jul 2019 16:50) (51 - 61)  BP: 142/86 (05 Jul 2019 16:50) (142/86 - 168/79)  BP(mean): --  RR: 20 (05 Jul 2019 16:50) (18 - 20)  SpO2: 97% (05 Jul 2019 16:50) (96% - 97%)    Constitutional - NAD, Comfortable  HEENT - right cranial incision with dressing  Neck - Supple  Chest - CTA bilaterally  Cardiovascular - RRR, S1S2  Abdomen - BS+, Soft, NTND  Extremities - No C/C/E, No calf tenderness   Neurologic Exam -                    Cognitive - Awake, Alert, Oriented to self, place, date, year, situation     Communication - Fluent, No dysarthria     Attention - able to recite months of the year backward     Memory - able to recall 3/3 items immediate and -/3 after 3 minutes     Cranial Nerves - CN 2-12 grossly intact     Motor - left arm and leg 4+/5 otherwise 5/5 throughout        Sensory - Intact to light touch except decreased on left LE  Proprioception impaired on left LE     Reflexes - DTR intact and symmetrical      Coordination - Finger-to-nose intact bilaterally, HTS intact  Psychiatric - Affect WNL  Skin - ecchymosis bilateral antecubital fossa

## 2019-07-05 NOTE — PROGRESS NOTE ADULT - PROVIDER SPECIALTY LIST ADULT
Heme/Onc
Hospitalist
Neurosurgery
SICU
Hospitalist

## 2019-07-05 NOTE — OCCUPATIONAL THERAPY INITIAL EVALUATION ADULT - LEVEL OF INDEPENDENCE: DRESS LOWER BODY, OT EVAL
assist with managing clothing over hips in standing.  Pt dons/doffs shoes/socks seated, cross leg/minimum assist (75% patients effort)

## 2019-07-05 NOTE — DISCHARGE NOTE PROVIDER - HOSPITAL COURSE
Patient is a 68 year old female with PMH of Carpal tunnel syndrome, former smoker quit 7-8 years ago, and wrist fracture repair in R hand who was recently admitted and discharged home yesterday after being diagnosed with a new right brain mass and a RLL lung mass. Patient was discharged home after she declined acute rehab and was instructed to follow up as outpatient for the biopsy reports,  readmitted with intractable nausea, vomiting and dizziness, noticed  multifocal brain metastasis, most notably with a hemorrhagic cystic component, S/p drainage of cyst/mass. Pt is stable, downgraded to hospitalist service. Pt was seen by PT recommend acute rehab, [facility]. Discussed with  present bedside, updated, answer all questions        A/P        >Brain metastatic with cystic lesion - s/p drainage of cyst/mass    -NS on board - recommend decrease Decadron 2 mg q6hrs plus PPI - taper     -cont. Keppra 500mg BID    - PMR consult appreciated - recommend acute rehab     f/u pathology    Family wishes to f/u [facility] as an outpatient        2. Lung Mass    -s/p lung biopsy on 6/26    -outpatient follow up with hem/onc as scheduled        3. Sinus Bradycardia -review old record, chronic    -asymptomatic    -EKG without evidence of heart block    -avoid AVN blockers    -TSH WNL    -monitor HR closely

## 2019-07-05 NOTE — PROGRESS NOTE ADULT - SUBJECTIVE AND OBJECTIVE BOX
Internal Medicine Hospitalist - Dr. Miranda MARSH    [id number]    68y      Female    Patient is a 68y old  Female who presents with a chief complaint of Nausea/vomiting (05 Jul 2019 08:32)    INTERVAL HPI/ OVERNIGHT EVENTS: Patient is seen and examined, no new event overnight.     REVIEW OF SYSTEMS:    Denied fever, chills, abd. pain, nausea, vomiting, chest pain, SOB, headache, dizziness    PHYSICAL EXAM:    Vital Signs Last 24 Hrs  T(C): 36.7 (05 Jul 2019 07:34), Max: 36.7 (05 Jul 2019 00:52)  T(F): 98.1 (05 Jul 2019 07:34), Max: 98.1 (05 Jul 2019 00:52)  HR: 60 (05 Jul 2019 07:34) (51 - 64)  BP: 157/92 (05 Jul 2019 07:34) (132/78 - 168/79)  BP(mean): --  RR: 20 (05 Jul 2019 07:34) (16 - 20)  SpO2: 96% (05 Jul 2019 00:52) (96% - 98%)    GENERAL: NAD  CHEST/LUNG: CTA b/l   HEART: S1S2+ audible  ABDOMEN: Soft, Nontender, Nondistended; Bowel sounds present  EXTREMITIES:  no edema  CNS: AAO X 3  Psychiatry: normal mood    LABS:                        13.7   17.86 )-----------( 244      ( 05 Jul 2019 07:37 )             42.1     07-05    140  |  101  |  26.0<H>  ----------------------------<  115  4.9   |  28.0  |  0.75    Ca    8.2<L>      05 Jul 2019 07:37  Phos  2.9     07-04  Mg     2.0     07-04              MEDICATIONS  (STANDING):  dexamethasone     Tablet 2 milliGRAM(s) Oral every 6 hours  docusate sodium 100 milliGRAM(s) Oral three times a day  levETIRAcetam 500 milliGRAM(s) Oral two times a day  pantoprazole    Tablet 40 milliGRAM(s) Oral before breakfast  polyethylene glycol 3350 17 Gram(s) Oral daily  senna 2 Tablet(s) Oral at bedtime  sterile water 1000 milliLiter(s) (250 mL/Hr) IV Continuous <Continuous>    MEDICATIONS  (PRN):  meclizine 25 milliGRAM(s) Oral four times a day PRN Dizziness  ondansetron Injectable 4 milliGRAM(s) IV Push once PRN Nausea and/or Vomiting      RADIOLOGY & ADDITIONAL TEST

## 2019-07-05 NOTE — H&P ADULT - NSHPSOCIALHISTORY_GEN_ALL_CORE
SOCIAL HISTORY  Smoking - long history of smoking; quit few years prior; previously smoking 1/2 - 1 ppd x20 years  EtOH - social etoh use  Drugs - Denied    FUNCTIONAL HISTORY  Lives with her   Home: 2 floor private house (Bedroom and bathroom available on main floor)  Ambulation: Independent w/o AD  ADLs: independent  Prior Home Equipment: cammode, bath chair, walker    CURRENT FUNCTIONAL STATUS: 7/5/19    Bed Mobility  Bed Mobility Training Supine-to-Sit: supervsion;  supervision;  bed rails  Bed Mobility Training Limitations: impaired balance;  impaired coordination;  decreased ability to use arms for pushing/pulling;  decreased ability to use legs for bridging/pushing    Sit-Stand Transfer Training  Transfer Training Sit-to-Stand Transfer: minimum assist (75% patient effort);  1 person assist;  verbal cues;  cues for hand placements;  full weight-bearing   rolling walker  Transfer Training Stand-to-Sit Transfer: minimum assist (75% patient effort);  1 person assist;  verbal cues;  cues for hand placements;  full weight-bearing   rolling walker  Sit-to-Stand Transfer Training Transfer Safety Analysis: decreased balance;  impaired balance;  impaired coordination;  rolling walker    Gait Training  Gait Training: contact guard;  minimum assist (75% patient effort);  1 person assist;  full weight-bearing   rolling walker;  50 feet;  10 feet;  and w/o devive  Gait Analysis: 2-point gait   decreased mack;  uneven mack,slight left lean;  impaired balance;  impaired coordination;  impaired vision;  vision is off as per pt.;  10 feet;  50 feet;  rolling walker;  and no device SOCIAL HISTORY  Smoking - long history of smoking; quit few years prior; previously smoking 1/2 - 1 ppd x20 years  EtOH - social etoh use  Drugs - Denied    FUNCTIONAL HISTORY  Lives with her   Home: 2 floor private house (Bedroom and bathroom available on main floor)  Ambulation: Independent w/o AD  ADLs: independent  Prior Home Equipment: commode, bath chair, walker    CURRENT FUNCTIONAL STATUS: 7/5/19    Bed Mobility  Bed Mobility Training Supine-to-Sit: supervision;  supervision;  bed rails  Bed Mobility Training Limitations: impaired balance;  impaired coordination;  decreased ability to use arms for pushing/pulling;  decreased ability to use legs for bridging/pushing    Sit-Stand Transfer Training  Transfer Training Sit-to-Stand Transfer: minimum assist (75% patient effort);  1 person assist;  verbal cues;  cues for hand placements;  full weight-bearing   rolling walker  Transfer Training Stand-to-Sit Transfer: minimum assist (75% patient effort);  1 person assist;  verbal cues;  cues for hand placements;  full weight-bearing   rolling walker  Sit-to-Stand Transfer Training Transfer Safety Analysis: decreased balance;  impaired balance;  impaired coordination;  rolling walker    Gait Training  Gait Training: contact guard;  minimum assist (75% patient effort);  1 person assist;  full weight-bearing   rolling walker;  50 feet;  10 feet;  and w/o devive  Gait Analysis: 2-point gait   decreased mack;  uneven mack,slight left lean;  impaired balance;  impaired coordination;  impaired vision;  vision is off as per pt.;  10 feet;  50 feet;  rolling walker;  and no device

## 2019-07-05 NOTE — H&P ADULT - NSHPREVIEWOFSYSTEMS_GEN_ALL_CORE
REVIEW OF SYSTEMS  Constitutional: No fever, No Chills, No fatigue  HEENT: No eye pain, No visual disturbances, No difficulty hearing  Pulm: No cough,  No shortness of breath  Cardio: No chest pain, No palpitations  GI:  No abdominal pain, No nausea, No vomiting, No diarrhea, No constipation  : No dysuria, No frequency, No hematuria  Neuro: No headaches, No memory loss, No loss of strength, No numbness, No tremors  Skin: No itching, No rashes, No lesions   Endo: No temperature intolerance  MSK: No joint pain, No joint swelling, No muscle pain, No Neck or back pain  Psych:  No depression, No anxiety REVIEW OF SYSTEMS  Constitutional: No fever, No Chills, No fatigue  HEENT: No head/incisional pain, eye pain, No visual disturbances, No difficulty hearing  Pulm: No cough,  No shortness of breath  Cardio: No chest pain, No palpitations  GI:  no bowel movement in 4 days, hasn't eaten much due to immobility, passing gas, no abdominal pain  : No dysuria, No frequency, No hematuria  Neuro: +left sided weakness, unsteady gait, dizziness improved   Skin: No itching, No rashes, +bruises from IV sites on arms   Endo: No temperature intolerance  MSK: No joint pain, No joint swelling, No muscle pain, No Neck or back pain  Psych:  No depression, No anxiety

## 2019-07-05 NOTE — CONSULT NOTE ADULT - CONSULT REASON
67YO woman with lung mass with brain mets s/p cyst aspiration 67 YO woman with lung mass with brain mets s/p cyst aspiration

## 2019-07-05 NOTE — H&P ADULT - NSHPOUTPATIENTPROVIDERS_GEN_ALL_CORE
Bret Talbot)  Neurosurgery  [facility], [address]  [place] [address]  Phone: [phone]    Pedro De Paz)  HematologyOncology; HospicePalliative Medicine; Internal Medicine  [address] [place] [address]  Phone: [phone]

## 2019-07-05 NOTE — PROGRESS NOTE ADULT - ASSESSMENT
Patient is a 68 year old female with PMH of Carpal tunnel syndrome, former smoker quit 7-8 years ago, and wrist fracture repair in R hand who was recently admitted and discharged home yesterday after being diagnosed with a new right brain mass and a RLL lung mass. Patient was discharged home after she declined acute rehab and was instructed to follow up as outpatient for the biopsy reports,  readmitted with intractable nausea, vomiting and dizziness, noticed  multifocal brain metastasis, most notably with a hemorrhagic cystic component, S/p drainage of cyst/mass. Pt is stable, downgraded to hospitalist service    A/P    >Brain metastatic with cystic lesion - s/p drainage of cyst/mass  -NS on board - recommend decrease Decadron 2 mg q6hrs plus PPI  -cont. Keppra 500mg BID  -Neuro check q4h  -Pt eval appreciated - acute vs home - will get  - PMR consult appreciated - recommend acute rehab -  reluctant to send her to acute rehab - will get PT reval today infront of   f/u pathology  Family wishes to f/u [facility] as an outpatient    2. Lung Mass  -s/p lung biopsy on 6/26  -outpatient follow up with hem/onc as scheduled    3. Sinus Bradycardia -review old record, chronic  -asymptomatic  -EKG without evidence of heart block  -avoid AVN blockers  -TSH WNL  -monitor HR closely    4. Constipation- c/w colace, see, add Miralex    DVT ppx - SCDS (no chemical ppx as discussed with neurosurgery)

## 2019-07-06 LAB
ALBUMIN SERPL ELPH-MCNC: 2.5 G/DL — LOW (ref 3.3–5)
ALP SERPL-CCNC: 77 U/L — SIGNIFICANT CHANGE UP (ref 40–120)
ALT FLD-CCNC: 83 U/L DA — HIGH (ref 10–45)
ANION GAP SERPL CALC-SCNC: 8 MMOL/L — SIGNIFICANT CHANGE UP (ref 5–17)
AST SERPL-CCNC: 23 U/L — SIGNIFICANT CHANGE UP (ref 10–40)
BASOPHILS # BLD AUTO: 0.04 K/UL — SIGNIFICANT CHANGE UP (ref 0–0.2)
BASOPHILS NFR BLD AUTO: 0.2 % — SIGNIFICANT CHANGE UP (ref 0–2)
BILIRUB SERPL-MCNC: 0.3 MG/DL — SIGNIFICANT CHANGE UP (ref 0.2–1.2)
BUN SERPL-MCNC: 23 MG/DL — SIGNIFICANT CHANGE UP (ref 7–23)
CALCIUM SERPL-MCNC: 8.2 MG/DL — LOW (ref 8.4–10.5)
CHLORIDE SERPL-SCNC: 105 MMOL/L — SIGNIFICANT CHANGE UP (ref 96–108)
CO2 SERPL-SCNC: 26 MMOL/L — SIGNIFICANT CHANGE UP (ref 22–31)
CREAT SERPL-MCNC: 0.78 MG/DL — SIGNIFICANT CHANGE UP (ref 0.5–1.3)
EOSINOPHIL # BLD AUTO: 0.01 K/UL — SIGNIFICANT CHANGE UP (ref 0–0.5)
EOSINOPHIL NFR BLD AUTO: 0.1 % — SIGNIFICANT CHANGE UP (ref 0–6)
GLUCOSE SERPL-MCNC: 94 MG/DL — SIGNIFICANT CHANGE UP (ref 70–99)
HCT VFR BLD CALC: 42.1 % — SIGNIFICANT CHANGE UP (ref 34.5–45)
HGB BLD-MCNC: 14.1 G/DL — SIGNIFICANT CHANGE UP (ref 11.5–15.5)
IMM GRANULOCYTES NFR BLD AUTO: 1.6 % — HIGH (ref 0–1.5)
LYMPHOCYTES # BLD AUTO: 1.62 K/UL — SIGNIFICANT CHANGE UP (ref 1–3.3)
LYMPHOCYTES # BLD AUTO: 8.9 % — LOW (ref 13–44)
MCHC RBC-ENTMCNC: 31.5 PG — SIGNIFICANT CHANGE UP (ref 27–34)
MCHC RBC-ENTMCNC: 33.5 GM/DL — SIGNIFICANT CHANGE UP (ref 32–36)
MCV RBC AUTO: 94 FL — SIGNIFICANT CHANGE UP (ref 80–100)
MONOCYTES # BLD AUTO: 1.58 K/UL — HIGH (ref 0–0.9)
MONOCYTES NFR BLD AUTO: 8.7 % — SIGNIFICANT CHANGE UP (ref 2–14)
NEUTROPHILS # BLD AUTO: 14.67 K/UL — HIGH (ref 1.8–7.4)
NEUTROPHILS NFR BLD AUTO: 80.5 % — HIGH (ref 43–77)
NRBC # BLD: 0 /100 WBCS — SIGNIFICANT CHANGE UP (ref 0–0)
PLATELET # BLD AUTO: 208 K/UL — SIGNIFICANT CHANGE UP (ref 150–400)
POTASSIUM SERPL-MCNC: 4.3 MMOL/L — SIGNIFICANT CHANGE UP (ref 3.5–5.3)
POTASSIUM SERPL-SCNC: 4.3 MMOL/L — SIGNIFICANT CHANGE UP (ref 3.5–5.3)
PROT SERPL-MCNC: 5.6 G/DL — LOW (ref 6–8.3)
RBC # BLD: 4.48 M/UL — SIGNIFICANT CHANGE UP (ref 3.8–5.2)
RBC # FLD: 14.3 % — SIGNIFICANT CHANGE UP (ref 10.3–14.5)
SODIUM SERPL-SCNC: 139 MMOL/L — SIGNIFICANT CHANGE UP (ref 135–145)
WBC # BLD: 18.22 K/UL — HIGH (ref 3.8–10.5)
WBC # FLD AUTO: 18.22 K/UL — HIGH (ref 3.8–10.5)

## 2019-07-06 PROCEDURE — 99223 1ST HOSP IP/OBS HIGH 75: CPT

## 2019-07-06 PROCEDURE — 99222 1ST HOSP IP/OBS MODERATE 55: CPT

## 2019-07-06 RX ADMIN — POLYETHYLENE GLYCOL 3350 17 GRAM(S): 17 POWDER, FOR SOLUTION ORAL at 11:49

## 2019-07-06 RX ADMIN — LEVETIRACETAM 500 MILLIGRAM(S): 250 TABLET, FILM COATED ORAL at 18:20

## 2019-07-06 RX ADMIN — PANTOPRAZOLE SODIUM 40 MILLIGRAM(S): 20 TABLET, DELAYED RELEASE ORAL at 06:06

## 2019-07-06 RX ADMIN — Medication 2 MILLIGRAM(S): at 18:20

## 2019-07-06 RX ADMIN — Medication 100 MILLIGRAM(S): at 18:20

## 2019-07-06 RX ADMIN — Medication 2 MILLIGRAM(S): at 23:21

## 2019-07-06 RX ADMIN — Medication 2 MILLIGRAM(S): at 11:49

## 2019-07-06 RX ADMIN — Medication 2 MILLIGRAM(S): at 06:06

## 2019-07-06 RX ADMIN — Medication 100 MILLIGRAM(S): at 06:06

## 2019-07-06 RX ADMIN — LEVETIRACETAM 500 MILLIGRAM(S): 250 TABLET, FILM COATED ORAL at 06:06

## 2019-07-06 NOTE — CONSULT NOTE ADULT - ASSESSMENT
ASSESSMENT/PLAN  68F with hx of CTS, former smoker quit 7-8 years ago, wrist fracture in right hand, with recent admission from 6/24-6/27 for newly diagnosed NSCLC with brain metastasis, who was re-admitted to [facility] on 6/29/19 for nausea, dizziness and inability to ambulate s/p aspiration drainage of cyst/mass on 7/2/19 being admitted here for rehab with Gait Instability, ADL impairments and Functional impairments.        1. NSCLC of the RLL with brain metastasis: s/p cystic drainage w/ gait instability and ADL / functional impairments  -Medically stable to participate in comprehensive inpatient rehabilitation program of PT/OT/SLP  -Continue with dexamethasone taper as per OSH: 2mg q6h for 3 days, then 2mg q8h for 3 days, then 2mg BID until outpatient follow  -Continue with keppra for seizure ppx   -Meclizine prn for dizziness  -Zofran prn for nausea  -tylenol prn for HA  -Continue with protonix while on steroids  -Follow up with NSGY upon discharge  -Follow up with [facility] as an outpatient - states her  is in charge of making all appointments    2. Sinus bradycardia:  -Monitor vitals  -Avoid AVN blockers    3. Constipation  -bowel regimen     4. DVT prophylaxis: Not on chemical prophylaxis given recent surgery, SCDs, TEDs

## 2019-07-06 NOTE — CONSULT NOTE ADULT - SUBJECTIVE AND OBJECTIVE BOX
CC: unsteady gait, nausea  HPI: 68 year old female former smoker (quit 7-8 years ago) who was on a cruise to [place] and experienced what at first she thought was motion sickness, but on return home her symptoms of dizziness, nausea and unsteady gait did not improve.  She underwent a workup and was found to have a  right brain mass and a RLL lung mass and underwent biopsy 6/26 which was positive for NSCLC with brain metastases and was discharged home. She was readmitted after she was unable to ambulate and on July 2nd underwent drainage of a hemorrhagic cystic brain mass.  She was started on keppra for seizure prevention and  presents to [facility] for acute rehabilitation.     PAST MEDICAL & SURGICAL HISTORY:  Brain tumor  Lung tumor  Carpal tunnel syndrome  Right wrist fracture    Social Hx: former smoker, quit 7-8 years ago    FAMILY HISTORY:  No pertinent family history in first degree relatives    Allergies: NKDA    Intolerances    Ensure TID RDOK (Unknown)    MEDICATIONS  (STANDING):  dexamethasone     Tablet 2 milliGRAM(s) Oral every 6 hours  dexamethasone     Tablet   Oral   docusate sodium 100 milliGRAM(s) Oral two times a day  levETIRAcetam 500 milliGRAM(s) Oral two times a day  pantoprazole    Tablet 40 milliGRAM(s) Oral before breakfast  polyethylene glycol 3350 17 Gram(s) Oral daily    MEDICATIONS  (PRN):  acetaminophen   Tablet .. 650 milliGRAM(s) Oral every 6 hours PRN Temp greater or equal to 38C (100.4F), Mild Pain (1 - 3)  meclizine 25 milliGRAM(s) Oral four times a day PRN Dizziness  ondansetron   Disintegrating Tablet 4 milliGRAM(s) Oral three times a day PRN Nausea and/or Vomiting  senna 2 Tablet(s) Oral at bedtime PRN Constipation    REVIEW OF SYSTEMS:   Positive for dizziness, mild HA relieved with tylenol; no double vision; no CP Palpitations, N/V/D, no BM past couple of days  Denies dysuria polyuria. All other systems reviewed were negative    Vital Signs Last 24 Hrs  T(C): 36.5 (06 Jul 2019 06:02), Max: 36.7 (05 Jul 2019 07:34)  T(F): 97.7 (06 Jul 2019 06:02), Max: 98.1 (05 Jul 2019 07:34)  HR: 58 (06 Jul 2019 06:02) (58 - 60)  BP: 150/73 (06 Jul 2019 06:02) (142/86 - 161/83)  BP(mean): --  RR: 14 (06 Jul 2019 06:02) (14 - 20)  SpO2: 96% (06 Jul 2019 06:02) (96% - 97%)  T(C): 36.5 (07-06-19 @ 06:02), Max: 36.7 (07-05-19 @ 07:34)  HR: 58 (07-06-19 @ 06:02) (58 - 60)  BP: 150/73 (07-06-19 @ 06:02) (142/86 - 161/83)  RR: 14 (07-06-19 @ 06:02) (14 - 20)  SpO2: 96% (07-06-19 @ 06:02) (96% - 97%)  Wt(kg): --Vital Signs Last 24 Hrs  T(C): 36.5 (06 Jul 2019 06:02), Max: 36.7 (05 Jul 2019 07:34)  T(F): 97.7 (06 Jul 2019 06:02), Max: 98.1 (05 Jul 2019 07:34)  HR: 58 (06 Jul 2019 06:02) (58 - 60)  BP: 150/73 (06 Jul 2019 06:02) (142/86 - 161/83)  BP(mean): --  RR: 14 (06 Jul 2019 06:02) (14 - 20)  SpO2: 96% (06 Jul 2019 06:02) (96% - 97%)    PHYSICAL EXAM:  GENERAL: NAD, well-groomed, well-developed  HEAD:  Normocephalic with right posterior occipital dressing that has serosanguinous drainage without purluence  EYES: EOMI, PERRLA, conjunctiva and sclera clear  ENMT: No tonsillar erythema, exudates, or enlargement; Moist mucous membranes, Good dentition, No lesions  NECK: Supple, No JVD, Normal thyroid  NERVOUS SYSTEM:  Alert & Oriented X3, Good concentration; Mild left sided weakness  CHEST/LUNG: Clear to percussion bilaterally; No rales, rhonchi, wheezing, or rubs  HEART: Regular rate and rhythm; No murmurs, rubs, or gallops  ABDOMEN: Soft, Nontender, Nondistended; Bowel sounds present  EXTREMITIES:  2+ Peripheral Pulses, No clubbing, cyanosis, or edema  LYMPH: No lymphadenopathy noted  SKIN: No rashes or lesions

## 2019-07-07 PROCEDURE — 99232 SBSQ HOSP IP/OBS MODERATE 35: CPT

## 2019-07-07 RX ADMIN — Medication 2 MILLIGRAM(S): at 11:28

## 2019-07-07 RX ADMIN — Medication 2 MILLIGRAM(S): at 06:01

## 2019-07-07 RX ADMIN — PANTOPRAZOLE SODIUM 40 MILLIGRAM(S): 20 TABLET, DELAYED RELEASE ORAL at 06:01

## 2019-07-07 RX ADMIN — Medication 100 MILLIGRAM(S): at 17:14

## 2019-07-07 RX ADMIN — Medication 100 MILLIGRAM(S): at 06:01

## 2019-07-07 RX ADMIN — Medication 2 MILLIGRAM(S): at 17:14

## 2019-07-07 RX ADMIN — LEVETIRACETAM 500 MILLIGRAM(S): 250 TABLET, FILM COATED ORAL at 17:14

## 2019-07-07 RX ADMIN — Medication 2 MILLIGRAM(S): at 23:28

## 2019-07-07 RX ADMIN — LEVETIRACETAM 500 MILLIGRAM(S): 250 TABLET, FILM COATED ORAL at 06:01

## 2019-07-07 NOTE — PROGRESS NOTE ADULT - SUBJECTIVE AND OBJECTIVE BOX
Subjective: No new complaints or overnight issues,  Pt. asking when she can wash hair.  States her Neurosurgeon told her Sunday.       REVIEW OF SYSTEMS  Pertinent in last 24 h: Neurological deficits    VITALS  T(C): 36.4 (07-07-19 @ 08:14), Max: 36.7 (07-06-19 @ 19:51)  HR: 63 (07-07-19 @ 08:14) (63 - 63)  BP: 117/70 (07-07-19 @ 08:14) (117/70 - 136/79)  RR: 16 (07-07-19 @ 08:14) (14 - 16)  SpO2: 96% (07-07-19 @ 08:14) (96% - 96%)  Wt(kg): --    Physical Exam:  Constitutional - NAD, Comfortable  HEENT - NCAT, EOMI.  Left temporal scalp incision--clean, no drainage.  Healing well. clear sutures.   Chest - CTA bilaterally,   Cardiovascular - RRR, S1S2,   Abdomen - BS+, Soft, NTND  Extremities - No edema, No calf tenderness   Neurologic Exam -    Stable                Cognitive - Awake, Alert, AAO to self, place, date, year, situation     Cranial Nerves - CN 2-12 intact     left arm and leg 4+/5 otherwise 5/5 throughout     	   Sensory - Intact to light touch except decreased on left LE  	Proprioception impaired on left LE  Psychiatric - Mood stable, Affect WNL  -    RECENT LABS/IMAGING                        14.1   18.22 )-----------( 208      ( 06 Jul 2019 06:05 )             42.1     07-06    139  |  105  |  23  ----------------------------<  94  4.3   |  26  |  0.78    Ca    8.2<L>      06 Jul 2019 06:05    TPro  5.6<L>  /  Alb  2.5<L>  /  TBili  0.3  /  DBili  x   /  AST  23  /  ALT  83<H>  /  AlkPhos  77  07-06            MEDICATIONS   acetaminophen   Tablet .. 650 milliGRAM(s) every 6 hours PRN  dexamethasone     Tablet 2 milliGRAM(s) every 6 hours  dexamethasone     Tablet     docusate sodium 100 milliGRAM(s) two times a day  levETIRAcetam 500 milliGRAM(s) two times a day  meclizine 25 milliGRAM(s) four times a day PRN  ondansetron   Disintegrating Tablet 4 milliGRAM(s) three times a day PRN  pantoprazole    Tablet 40 milliGRAM(s) before breakfast  polyethylene glycol 3350 17 Gram(s) daily  senna 2 Tablet(s) at bedtime PRN      --------------------------------------------------------------------

## 2019-07-08 ENCOUNTER — INBOUND DOCUMENT (OUTPATIENT)
Age: 69
End: 2019-07-08

## 2019-07-08 DIAGNOSIS — C79.31 SECONDARY MALIGNANT NEOPLASM OF BRAIN: ICD-10-CM

## 2019-07-08 LAB — NON-GYNECOLOGICAL CYTOLOGY STUDY: SIGNIFICANT CHANGE UP

## 2019-07-08 PROCEDURE — 96116 NUBHVL XM PHYS/QHP 1ST HR: CPT

## 2019-07-08 PROCEDURE — 99233 SBSQ HOSP IP/OBS HIGH 50: CPT

## 2019-07-08 PROCEDURE — 99232 SBSQ HOSP IP/OBS MODERATE 35: CPT

## 2019-07-08 RX ADMIN — LEVETIRACETAM 500 MILLIGRAM(S): 250 TABLET, FILM COATED ORAL at 16:47

## 2019-07-08 RX ADMIN — LEVETIRACETAM 500 MILLIGRAM(S): 250 TABLET, FILM COATED ORAL at 05:26

## 2019-07-08 RX ADMIN — Medication 2 MILLIGRAM(S): at 05:27

## 2019-07-08 RX ADMIN — Medication 2 MILLIGRAM(S): at 16:47

## 2019-07-08 RX ADMIN — Medication 2 MILLIGRAM(S): at 12:01

## 2019-07-08 RX ADMIN — PANTOPRAZOLE SODIUM 40 MILLIGRAM(S): 20 TABLET, DELAYED RELEASE ORAL at 05:26

## 2019-07-08 RX ADMIN — Medication 100 MILLIGRAM(S): at 16:47

## 2019-07-08 RX ADMIN — POLYETHYLENE GLYCOL 3350 17 GRAM(S): 17 POWDER, FOR SOLUTION ORAL at 12:01

## 2019-07-08 RX ADMIN — Medication 100 MILLIGRAM(S): at 05:26

## 2019-07-08 NOTE — PROGRESS NOTE ADULT - SUBJECTIVE AND OBJECTIVE BOX
DHARA MARSH  68y  Female    Patient is a 68y old  Female who presents with a chief complaint of Brain mass resection (07 Jul 2019 10:31)    Pt seen and examined  no acute events overnight  feels well, no complaints     PAST MEDICAL & SURGICAL HISTORY:  Brain tumor  Lung tumor  Carpal tunnel syndrome        MedsMEDICATIONS  (STANDING):  dexamethasone     Tablet 2 milliGRAM(s) Oral every 6 hours  dexamethasone     Tablet   Oral   docusate sodium 100 milliGRAM(s) Oral two times a day  levETIRAcetam 500 milliGRAM(s) Oral two times a day  pantoprazole    Tablet 40 milliGRAM(s) Oral before breakfast  polyethylene glycol 3350 17 Gram(s) Oral daily    MEDICATIONS  (PRN):  acetaminophen   Tablet .. 650 milliGRAM(s) Oral every 6 hours PRN Temp greater or equal to 38C (100.4F), Mild Pain (1 - 3)  meclizine 25 milliGRAM(s) Oral four times a day PRN Dizziness  ondansetron   Disintegrating Tablet 4 milliGRAM(s) Oral three times a day PRN Nausea and/or Vomiting  senna 2 Tablet(s) Oral at bedtime PRN Constipation      Vital Signs Last 24 Hrs  T(C): 36.4 (08 Jul 2019 07:11), Max: 36.6 (07 Jul 2019 20:42)  T(F): 97.6 (08 Jul 2019 07:11), Max: 97.9 (07 Jul 2019 20:42)  HR: 56 (08 Jul 2019 07:11) (56 - 57)  BP: 140/81 (08 Jul 2019 07:11) (136/76 - 140/81)  BP(mean): --  RR: 15 (08 Jul 2019 07:11) (15 - 15)  SpO2: 96% (08 Jul 2019 07:11) (95% - 96%)    PHYSICAL EXAM:  GENERAL: NAD  HEAD:  NC/AT  NERVOUS SYSTEM:  Alert & Oriented X3  CHEST/LUNG: Clear lungs b/l  HEART: S1S2, RRR   ABDOMEN: Soft, non-tender, non-distended, + bowel sounds            RADIOLOGY & ADDITIONAL TESTS:    Imaging Personally Reviewed:  [x ] YES  [ ] NO      HEALTH ISSUES - PROBLEM Dx:          Care Discussed with Consultants/Other Providers [ x] YES  [ ] NO

## 2019-07-08 NOTE — PROGRESS NOTE ADULT - SUBJECTIVE AND OBJECTIVE BOX
Subjective: No acute events overnight. Patient reports that she had a BM yesterday and slept much better because she was more comfortable. Reports some increased sensitivity around the incision. Denies headache, chest pain, SOB or urinary sx.     REVIEW OF SYMPTOMS  Pertinent in the last 24 hours: Neurological deficits  +BM    VITALS  Vital Signs Last 24 Hrs  T(C): 36.4 (08 Jul 2019 07:11), Max: 36.6 (07 Jul 2019 20:42)  T(F): 97.6 (08 Jul 2019 07:11), Max: 97.9 (07 Jul 2019 20:42)  HR: 56 (08 Jul 2019 07:11) (56 - 57)  BP: 140/81 (08 Jul 2019 07:11) (136/76 - 140/81)  BP(mean): --  RR: 15 (08 Jul 2019 07:11) (15 - 15)  SpO2: 96% (08 Jul 2019 07:11) (95% - 96%)      PHYSICAL EXAM  Constitutional - NAD, Comfortable  HEENT -  Left temporal scalp incision--clean, no drainage.  Healing well. clear sutures.   Chest - CTA bilaterally, no increased work of breathing  Cardiovascular - RRR, S1S2,   Abdomen - BS+, Soft, NTND  Extremities - No edema, No calf tenderness   Neurologic Exam -    Stable                Cognitive - Awake, Alert, AAO to self, place, date, year, situation     Cranial Nerves - CN 2-12 intact     left arm and leg 4+/5 otherwise 5/5 throughout     Psychiatric - Mood stable, Affect WNL      RECENT LABS                  RADIOLOGY/OTHER RESULTS      MEDICATIONS  (STANDING):  dexamethasone     Tablet 2 milliGRAM(s) Oral every 6 hours  dexamethasone     Tablet   Oral   docusate sodium 100 milliGRAM(s) Oral two times a day  levETIRAcetam 500 milliGRAM(s) Oral two times a day  pantoprazole    Tablet 40 milliGRAM(s) Oral before breakfast  polyethylene glycol 3350 17 Gram(s) Oral daily    MEDICATIONS  (PRN):  acetaminophen   Tablet .. 650 milliGRAM(s) Oral every 6 hours PRN Temp greater or equal to 38C (100.4F), Mild Pain (1 - 3)  meclizine 25 milliGRAM(s) Oral four times a day PRN Dizziness  ondansetron   Disintegrating Tablet 4 milliGRAM(s) Oral three times a day PRN Nausea and/or Vomiting  senna 2 Tablet(s) Oral at bedtime PRN Constipation

## 2019-07-09 ENCOUNTER — INBOUND DOCUMENT (OUTPATIENT)
Age: 69
End: 2019-07-09

## 2019-07-09 LAB
ALBUMIN SERPL ELPH-MCNC: 2.8 G/DL — LOW (ref 3.3–5)
ALP SERPL-CCNC: 88 U/L — SIGNIFICANT CHANGE UP (ref 40–120)
ALT FLD-CCNC: 63 U/L DA — HIGH (ref 10–45)
ANION GAP SERPL CALC-SCNC: 8 MMOL/L — SIGNIFICANT CHANGE UP (ref 5–17)
AST SERPL-CCNC: 28 U/L — SIGNIFICANT CHANGE UP (ref 10–40)
BASOPHILS # BLD AUTO: 0.05 K/UL — SIGNIFICANT CHANGE UP (ref 0–0.2)
BASOPHILS NFR BLD AUTO: 0.2 % — SIGNIFICANT CHANGE UP (ref 0–2)
BILIRUB SERPL-MCNC: 0.3 MG/DL — SIGNIFICANT CHANGE UP (ref 0.2–1.2)
BUN SERPL-MCNC: 29 MG/DL — HIGH (ref 7–23)
CALCIUM SERPL-MCNC: 8.4 MG/DL — SIGNIFICANT CHANGE UP (ref 8.4–10.5)
CHLORIDE SERPL-SCNC: 103 MMOL/L — SIGNIFICANT CHANGE UP (ref 96–108)
CO2 SERPL-SCNC: 26 MMOL/L — SIGNIFICANT CHANGE UP (ref 22–31)
CREAT SERPL-MCNC: 0.88 MG/DL — SIGNIFICANT CHANGE UP (ref 0.5–1.3)
EOSINOPHIL # BLD AUTO: 0.11 K/UL — SIGNIFICANT CHANGE UP (ref 0–0.5)
EOSINOPHIL NFR BLD AUTO: 0.5 % — SIGNIFICANT CHANGE UP (ref 0–6)
GLUCOSE SERPL-MCNC: 90 MG/DL — SIGNIFICANT CHANGE UP (ref 70–99)
HCT VFR BLD CALC: 41 % — SIGNIFICANT CHANGE UP (ref 34.5–45)
HGB BLD-MCNC: 13.8 G/DL — SIGNIFICANT CHANGE UP (ref 11.5–15.5)
IMM GRANULOCYTES NFR BLD AUTO: 1.4 % — SIGNIFICANT CHANGE UP (ref 0–1.5)
LYMPHOCYTES # BLD AUTO: 10.7 % — LOW (ref 13–44)
LYMPHOCYTES # BLD AUTO: 2.28 K/UL — SIGNIFICANT CHANGE UP (ref 1–3.3)
MCHC RBC-ENTMCNC: 30.7 PG — SIGNIFICANT CHANGE UP (ref 27–34)
MCHC RBC-ENTMCNC: 33.7 GM/DL — SIGNIFICANT CHANGE UP (ref 32–36)
MCV RBC AUTO: 91.3 FL — SIGNIFICANT CHANGE UP (ref 80–100)
MONOCYTES # BLD AUTO: 1.98 K/UL — HIGH (ref 0–0.9)
MONOCYTES NFR BLD AUTO: 9.3 % — SIGNIFICANT CHANGE UP (ref 2–14)
NEUTROPHILS # BLD AUTO: 16.63 K/UL — HIGH (ref 1.8–7.4)
NEUTROPHILS NFR BLD AUTO: 77.9 % — HIGH (ref 43–77)
NRBC # BLD: 0 /100 WBCS — SIGNIFICANT CHANGE UP (ref 0–0)
PLATELET # BLD AUTO: 225 K/UL — SIGNIFICANT CHANGE UP (ref 150–400)
POTASSIUM SERPL-MCNC: 4.3 MMOL/L — SIGNIFICANT CHANGE UP (ref 3.5–5.3)
POTASSIUM SERPL-SCNC: 4.3 MMOL/L — SIGNIFICANT CHANGE UP (ref 3.5–5.3)
PROT SERPL-MCNC: 6 G/DL — SIGNIFICANT CHANGE UP (ref 6–8.3)
RBC # BLD: 4.49 M/UL — SIGNIFICANT CHANGE UP (ref 3.8–5.2)
RBC # FLD: 14.3 % — SIGNIFICANT CHANGE UP (ref 10.3–14.5)
SODIUM SERPL-SCNC: 137 MMOL/L — SIGNIFICANT CHANGE UP (ref 135–145)
WBC # BLD: 21.34 K/UL — HIGH (ref 3.8–10.5)
WBC # FLD AUTO: 21.34 K/UL — HIGH (ref 3.8–10.5)

## 2019-07-09 PROCEDURE — 99232 SBSQ HOSP IP/OBS MODERATE 35: CPT

## 2019-07-09 PROCEDURE — 99233 SBSQ HOSP IP/OBS HIGH 50: CPT

## 2019-07-09 RX ADMIN — Medication 2 MILLIGRAM(S): at 12:32

## 2019-07-09 RX ADMIN — LEVETIRACETAM 500 MILLIGRAM(S): 250 TABLET, FILM COATED ORAL at 17:40

## 2019-07-09 RX ADMIN — Medication 2 MILLIGRAM(S): at 21:31

## 2019-07-09 RX ADMIN — PANTOPRAZOLE SODIUM 40 MILLIGRAM(S): 20 TABLET, DELAYED RELEASE ORAL at 05:39

## 2019-07-09 RX ADMIN — Medication 100 MILLIGRAM(S): at 05:39

## 2019-07-09 RX ADMIN — LEVETIRACETAM 500 MILLIGRAM(S): 250 TABLET, FILM COATED ORAL at 05:39

## 2019-07-09 RX ADMIN — Medication 2 MILLIGRAM(S): at 05:39

## 2019-07-09 RX ADMIN — Medication 100 MILLIGRAM(S): at 17:40

## 2019-07-09 NOTE — PROGRESS NOTE ADULT - SUBJECTIVE AND OBJECTIVE BOX
DHARA MARSH  68y  Female    Patient is a 68y old  Female who presents with a chief complaint of Brain mass resection (08 Jul 2019 09:47)    Pt seen and examined  no acute events overnight    feels well     PAST MEDICAL & SURGICAL HISTORY:  Brain tumor  Lung tumor  Carpal tunnel syndrome        MedsMEDICATIONS  (STANDING):  dexamethasone     Tablet 2 milliGRAM(s) Oral every 8 hours  dexamethasone     Tablet   Oral   docusate sodium 100 milliGRAM(s) Oral two times a day  levETIRAcetam 500 milliGRAM(s) Oral two times a day  pantoprazole    Tablet 40 milliGRAM(s) Oral before breakfast  polyethylene glycol 3350 17 Gram(s) Oral daily    MEDICATIONS  (PRN):  acetaminophen   Tablet .. 650 milliGRAM(s) Oral every 6 hours PRN Temp greater or equal to 38C (100.4F), Mild Pain (1 - 3)  meclizine 25 milliGRAM(s) Oral four times a day PRN Dizziness  ondansetron   Disintegrating Tablet 4 milliGRAM(s) Oral three times a day PRN Nausea and/or Vomiting  senna 2 Tablet(s) Oral at bedtime PRN Constipation      Vital Signs Last 24 Hrs  T(C): 36.8 (09 Jul 2019 07:33), Max: 36.8 (09 Jul 2019 07:33)  T(F): 98.3 (09 Jul 2019 07:33), Max: 98.3 (09 Jul 2019 07:33)  HR: 57 (09 Jul 2019 07:33) (53 - 57)  BP: 124/76 (09 Jul 2019 07:33) (124/76 - 144/73)  BP(mean): --  RR: 15 (09 Jul 2019 07:33) (15 - 15)  SpO2: 98% (09 Jul 2019 07:33) (96% - 98%)    PHYSICAL EXAM:  GENERAL: NAD  HEAD:  NC/AT  NERVOUS SYSTEM:  Alert & Oriented X3  CHEST/LUNG: Clear lungs b/l  HEART: S1S2, RRR   ABDOMEN: Soft, non-tender, non-distended, + bowel sounds  EXTREMITIES:  2+ Peripheral Pulses, No clubbing, cyanosis, or edema      LABS:                          13.8   21.34 )-----------( 225      ( 09 Jul 2019 05:45 )             41.0       07-09    137  |  103  |  29<H>  ----------------------------<  90  4.3   |  26  |  0.88    Ca    8.4      09 Jul 2019 05:45    TPro  6.0  /  Alb  2.8<L>  /  TBili  0.3  /  DBili  x   /  AST  28  /  ALT  63<H>  /  AlkPhos  88  07-09                                    RADIOLOGY & ADDITIONAL TESTS:    Imaging Personally Reviewed:  [ x] YES  [ ] NO      HEALTH ISSUES - PROBLEM Dx:          Care Discussed with Consultants/Other Providers [ x] YES  [ ] NO

## 2019-07-09 NOTE — PROGRESS NOTE ADULT - SUBJECTIVE AND OBJECTIVE BOX
Subjective: No acute events overnight. Patient reports that she has mild lightheadedness when she gets up, but it self resolves in a few minutes. Denies headache, chest pain, abdominal or urinary sx.     REVIEW OF SYMPTOMS  Pertinent in the last 24 hours: Neurological deficits    VITALS  Vital Signs Last 24 Hrs  T(C): 36.8 (09 Jul 2019 07:33), Max: 36.8 (09 Jul 2019 07:33)  T(F): 98.3 (09 Jul 2019 07:33), Max: 98.3 (09 Jul 2019 07:33)  HR: 57 (09 Jul 2019 07:33) (53 - 57)  BP: 124/76 (09 Jul 2019 07:33) (124/76 - 144/73)  BP(mean): --  RR: 15 (09 Jul 2019 07:33) (15 - 15)  SpO2: 98% (09 Jul 2019 07:33) (96% - 98%)      PHYSICAL EXAM  Constitutional - NAD, Comfortable  HEENT -  Left temporal scalp incision--clean, no drainage.  Healing well. clear sutures.   Chest - CTA bilaterally, no increased work of breathing  Cardiovascular - RRR, S1S2,   Abdomen - BS+, Soft, NTND  Extremities - No edema, No calf tenderness   Neurologic Exam -    Stable                Cognitive - Awake, Alert, AAO to self, place, date, year, situation     Cranial Nerves - CN 2-12 intact     left arm and leg 4+/5 otherwise 5/5 throughout     Psychiatric - Mood stable, Affect WNL      RECENT LABS                        13.8   21.34 )-----------( 225      ( 09 Jul 2019 05:45 )             41.0     07-09    137  |  103  |  29<H>  ----------------------------<  90  4.3   |  26  |  0.88    Ca    8.4      09 Jul 2019 05:45    TPro  6.0  /  Alb  2.8<L>  /  TBili  0.3  /  DBili  x   /  AST  28  /  ALT  63<H>  /  AlkPhos  88  07-09            RADIOLOGY/OTHER RESULTS      MEDICATIONS  (STANDING):  dexamethasone     Tablet 2 milliGRAM(s) Oral every 8 hours  dexamethasone     Tablet   Oral   docusate sodium 100 milliGRAM(s) Oral two times a day  levETIRAcetam 500 milliGRAM(s) Oral two times a day  pantoprazole    Tablet 40 milliGRAM(s) Oral before breakfast  polyethylene glycol 3350 17 Gram(s) Oral daily    MEDICATIONS  (PRN):  acetaminophen   Tablet .. 650 milliGRAM(s) Oral every 6 hours PRN Temp greater or equal to 38C (100.4F), Mild Pain (1 - 3)  meclizine 25 milliGRAM(s) Oral four times a day PRN Dizziness  ondansetron   Disintegrating Tablet 4 milliGRAM(s) Oral three times a day PRN Nausea and/or Vomiting  senna 2 Tablet(s) Oral at bedtime PRN Constipation

## 2019-07-09 NOTE — DIETITIAN INITIAL EVALUATION ADULT. - OTHER INFO
68F with hx of CTS, former smoker quit 7-8 years ago, wrist fracture in right hand, with recent admission from 6/24-6/27 for newly diagnosed NSCLC with brain metastasis, who was re-admitted to [facility] on 6/29/19 for nausea, dizziness and inability to ambulate s/p aspiration drainage of cyst/mass on 7/2/19 being admitted here for rehab with left sided weakness, Gait Instability, ADL impairments and Functional impairments. Appetite is reported good improved since surgery , (+) BM 7/9. Provide patient with prunes QD 2/2 hxof constipation . NKFA noted, no difficulties with chewing/swallowing.

## 2019-07-10 RX ADMIN — LEVETIRACETAM 500 MILLIGRAM(S): 250 TABLET, FILM COATED ORAL at 05:39

## 2019-07-10 RX ADMIN — Medication 2 MILLIGRAM(S): at 21:45

## 2019-07-10 RX ADMIN — Medication 2 MILLIGRAM(S): at 12:56

## 2019-07-10 RX ADMIN — Medication 2 MILLIGRAM(S): at 05:38

## 2019-07-10 RX ADMIN — Medication 100 MILLIGRAM(S): at 05:39

## 2019-07-10 RX ADMIN — LEVETIRACETAM 500 MILLIGRAM(S): 250 TABLET, FILM COATED ORAL at 17:07

## 2019-07-10 RX ADMIN — PANTOPRAZOLE SODIUM 40 MILLIGRAM(S): 20 TABLET, DELAYED RELEASE ORAL at 05:39

## 2019-07-11 ENCOUNTER — TRANSCRIPTION ENCOUNTER (OUTPATIENT)
Age: 69
End: 2019-07-11

## 2019-07-11 LAB
ALBUMIN SERPL ELPH-MCNC: 2.7 G/DL — LOW (ref 3.3–5)
ALP SERPL-CCNC: 80 U/L — SIGNIFICANT CHANGE UP (ref 40–120)
ALT FLD-CCNC: 52 U/L DA — HIGH (ref 10–45)
ANION GAP SERPL CALC-SCNC: 6 MMOL/L — SIGNIFICANT CHANGE UP (ref 5–17)
AST SERPL-CCNC: 20 U/L — SIGNIFICANT CHANGE UP (ref 10–40)
BASOPHILS # BLD AUTO: 0.03 K/UL — SIGNIFICANT CHANGE UP (ref 0–0.2)
BASOPHILS NFR BLD AUTO: 0.2 % — SIGNIFICANT CHANGE UP (ref 0–2)
BILIRUB SERPL-MCNC: 0.3 MG/DL — SIGNIFICANT CHANGE UP (ref 0.2–1.2)
BUN SERPL-MCNC: 23 MG/DL — SIGNIFICANT CHANGE UP (ref 7–23)
CALCIUM SERPL-MCNC: 8.6 MG/DL — SIGNIFICANT CHANGE UP (ref 8.4–10.5)
CHLORIDE SERPL-SCNC: 102 MMOL/L — SIGNIFICANT CHANGE UP (ref 96–108)
CO2 SERPL-SCNC: 30 MMOL/L — SIGNIFICANT CHANGE UP (ref 22–31)
CREAT SERPL-MCNC: 0.86 MG/DL — SIGNIFICANT CHANGE UP (ref 0.5–1.3)
EOSINOPHIL # BLD AUTO: 0.11 K/UL — SIGNIFICANT CHANGE UP (ref 0–0.5)
EOSINOPHIL NFR BLD AUTO: 0.7 % — SIGNIFICANT CHANGE UP (ref 0–6)
GLUCOSE SERPL-MCNC: 97 MG/DL — SIGNIFICANT CHANGE UP (ref 70–99)
HCT VFR BLD CALC: 38.7 % — SIGNIFICANT CHANGE UP (ref 34.5–45)
HGB BLD-MCNC: 12.8 G/DL — SIGNIFICANT CHANGE UP (ref 11.5–15.5)
IMM GRANULOCYTES NFR BLD AUTO: 0.8 % — SIGNIFICANT CHANGE UP (ref 0–1.5)
LYMPHOCYTES # BLD AUTO: 1.39 K/UL — SIGNIFICANT CHANGE UP (ref 1–3.3)
LYMPHOCYTES # BLD AUTO: 8.3 % — LOW (ref 13–44)
MCHC RBC-ENTMCNC: 30.7 PG — SIGNIFICANT CHANGE UP (ref 27–34)
MCHC RBC-ENTMCNC: 33.1 GM/DL — SIGNIFICANT CHANGE UP (ref 32–36)
MCV RBC AUTO: 92.8 FL — SIGNIFICANT CHANGE UP (ref 80–100)
MONOCYTES # BLD AUTO: 1.52 K/UL — HIGH (ref 0–0.9)
MONOCYTES NFR BLD AUTO: 9.1 % — SIGNIFICANT CHANGE UP (ref 2–14)
NEUTROPHILS # BLD AUTO: 13.54 K/UL — HIGH (ref 1.8–7.4)
NEUTROPHILS NFR BLD AUTO: 80.9 % — HIGH (ref 43–77)
NRBC # BLD: 0 /100 WBCS — SIGNIFICANT CHANGE UP (ref 0–0)
PLATELET # BLD AUTO: 241 K/UL — SIGNIFICANT CHANGE UP (ref 150–400)
POTASSIUM SERPL-MCNC: 4.2 MMOL/L — SIGNIFICANT CHANGE UP (ref 3.5–5.3)
POTASSIUM SERPL-SCNC: 4.2 MMOL/L — SIGNIFICANT CHANGE UP (ref 3.5–5.3)
PROT SERPL-MCNC: 5.8 G/DL — LOW (ref 6–8.3)
RBC # BLD: 4.17 M/UL — SIGNIFICANT CHANGE UP (ref 3.8–5.2)
RBC # FLD: 14.9 % — HIGH (ref 10.3–14.5)
SODIUM SERPL-SCNC: 138 MMOL/L — SIGNIFICANT CHANGE UP (ref 135–145)
WBC # BLD: 16.73 K/UL — HIGH (ref 3.8–10.5)
WBC # FLD AUTO: 16.73 K/UL — HIGH (ref 3.8–10.5)

## 2019-07-11 PROCEDURE — 99233 SBSQ HOSP IP/OBS HIGH 50: CPT

## 2019-07-11 PROCEDURE — 99232 SBSQ HOSP IP/OBS MODERATE 35: CPT

## 2019-07-11 RX ORDER — ACETAMINOPHEN 500 MG
2 TABLET ORAL
Qty: 0 | Refills: 0 | DISCHARGE
Start: 2019-07-11

## 2019-07-11 RX ORDER — LEVETIRACETAM 250 MG/1
1 TABLET, FILM COATED ORAL
Qty: 60 | Refills: 0
Start: 2019-07-11 | End: 2019-08-09

## 2019-07-11 RX ORDER — PANTOPRAZOLE SODIUM 20 MG/1
1 TABLET, DELAYED RELEASE ORAL
Qty: 30 | Refills: 0
Start: 2019-07-11 | End: 2019-08-09

## 2019-07-11 RX ORDER — DEXAMETHASONE 0.5 MG/5ML
1 ELIXIR ORAL
Qty: 60 | Refills: 0
Start: 2019-07-11 | End: 2019-08-09

## 2019-07-11 RX ADMIN — LEVETIRACETAM 500 MILLIGRAM(S): 250 TABLET, FILM COATED ORAL at 06:14

## 2019-07-11 RX ADMIN — Medication 2 MILLIGRAM(S): at 06:14

## 2019-07-11 RX ADMIN — Medication 100 MILLIGRAM(S): at 06:14

## 2019-07-11 RX ADMIN — LEVETIRACETAM 500 MILLIGRAM(S): 250 TABLET, FILM COATED ORAL at 18:16

## 2019-07-11 RX ADMIN — Medication 2 MILLIGRAM(S): at 21:37

## 2019-07-11 RX ADMIN — PANTOPRAZOLE SODIUM 40 MILLIGRAM(S): 20 TABLET, DELAYED RELEASE ORAL at 06:14

## 2019-07-11 RX ADMIN — Medication 100 MILLIGRAM(S): at 18:16

## 2019-07-11 RX ADMIN — Medication 2 MILLIGRAM(S): at 13:05

## 2019-07-11 NOTE — DISCHARGE NOTE PROVIDER - CARE PROVIDERS DIRECT ADDRESSES
[email or web address]

## 2019-07-11 NOTE — DISCHARGE NOTE PROVIDER - HOSPITAL COURSE
68F with hx of CTS, former smoker quit 7-8 years ago, wrist fracture in right hand, with recent admission from 6/24-6/27 for newly diagnosed NSCLC with brain metastasis, who was re-admitted to [facility] on 6/29/19 for nausea, dizziness and inability to ambulate s/p aspiration drainage of cyst/mass on 7/2/19 being admitted here for rehab. Of note, patient was admitted to [facility] from 6/24/19-6/28/19 for difficulty with ambulation. Reports that she felt like she was wobbly and was going to fall down. Imaging demonstrated cystic necrotic hemorrhagic mass with nodularity in the right posterior frontal parietal region with involvement of the splenium of the corpus callosum and crossing the midline as well as positive extensive vasogenic edema. Also seen to have additional lesions involving the right frontal and left occipital lobe. She was also found to have RLL lung mass s/p lung biopsy on 6/26 and consistent with NSCLC. Brain lesions thought likely due to to brain metastasis given presentation per radiation oncology. NSGY was consulted and patient started on keppra for seizure ppx and steroids. Patient was medically stabilized and discharged home. However, she returned on 6/29 with complaints of nausea, dizziness and inability to ambulate. She underwent an aspiration of cyst of brain with CT guidance Dr. Bert Talbot 7/2/2019 with 20cc of hemorrhagic cyst fluid removed.         Hospital course complicated sinus bradycardia. Patient medically stabilized and admitted here for rehab.         Patient admitted here for rehab. Her course was complicated by elevated ALT, which improved without intervention. Patient remained asymptomatic at time of discharge. Patient will follow up with her primary care doctor upon discharge.         Patient made functional improvements with ADLs, mobility, gait and cognition through therapy. At time of discharge, patient was contact guard with transfers and contact guard to min assist with transfers. Family completed family training.  Patient will continue with therapy as an outpatient. Patient medically stable for discharge.

## 2019-07-11 NOTE — PROGRESS NOTE ADULT - SUBJECTIVE AND OBJECTIVE BOX
DHARA MARSH  68y  Female    Patient is a 68y old  Female who presents with a chief complaint of Brain mass resection (09 Jul 2019 11:26)    Pt seen and examined  no acute events overnight  feels well     PAST MEDICAL & SURGICAL HISTORY:  Brain tumor  Lung tumor  Carpal tunnel syndrome        MedsMEDICATIONS  (STANDING):  dexamethasone     Tablet 2 milliGRAM(s) Oral every 8 hours  dexamethasone     Tablet   Oral   docusate sodium 100 milliGRAM(s) Oral two times a day  levETIRAcetam 500 milliGRAM(s) Oral two times a day  pantoprazole    Tablet 40 milliGRAM(s) Oral before breakfast  polyethylene glycol 3350 17 Gram(s) Oral daily    MEDICATIONS  (PRN):  acetaminophen   Tablet .. 650 milliGRAM(s) Oral every 6 hours PRN Temp greater or equal to 38C (100.4F), Mild Pain (1 - 3)  meclizine 25 milliGRAM(s) Oral four times a day PRN Dizziness  ondansetron   Disintegrating Tablet 4 milliGRAM(s) Oral three times a day PRN Nausea and/or Vomiting  senna 2 Tablet(s) Oral at bedtime PRN Constipation      Vital Signs Last 24 Hrs  T(C): 36.5 (11 Jul 2019 08:07), Max: 36.9 (10 Jul 2019 20:47)  T(F): 97.7 (11 Jul 2019 08:07), Max: 98.4 (10 Jul 2019 20:47)  HR: 53 (11 Jul 2019 08:07) (53 - 56)  BP: 130/70 (11 Jul 2019 08:07) (114/72 - 130/70)  BP(mean): --  RR: 14 (11 Jul 2019 08:07) (14 - 14)  SpO2: 96% (11 Jul 2019 08:07) (96% - 98%)    PHYSICAL EXAM:  GENERAL: NAD  HEAD:  NC/AT  EYES: EOMI, PERRLA, conjunctiva and sclera clear  NERVOUS SYSTEM:  Alert & Oriented X3  CHEST/LUNG: Clear lungs b/l  HEART: S1S2, RRR   ABDOMEN: Soft, non-tender, non-distended, + bowel sounds  EXTREMITIES:  2+ Peripheral Pulses, No clubbing, cyanosis, or edema  SKIN: No rashes or lesions    LABS:                          12.8   16.73 )-----------( 241      ( 11 Jul 2019 06:15 )             38.7       07-11    138  |  102  |  23  ----------------------------<  97  4.2   |  30  |  0.86    Ca    8.6      11 Jul 2019 06:15    TPro  5.8<L>  /  Alb  2.7<L>  /  TBili  0.3  /  DBili  x   /  AST  20  /  ALT  52<H>  /  AlkPhos  80  07-11                                    RADIOLOGY & ADDITIONAL TESTS:    Imaging Personally Reviewed:  [ ] YES  [ ] NO      HEALTH ISSUES - PROBLEM Dx:          Care Discussed with Consultants/Other Providers [ x] YES  [ ] NO

## 2019-07-11 NOTE — DISCHARGE NOTE PROVIDER - PROVIDER TOKENS
PROVIDER:[TOKEN:[[id number]]],PROVIDER:[TOKEN:[[id number]]],PROVIDER:[TOKEN:[[id number]]] PROVIDER:[TOKEN:[[id number]]],PROVIDER:[TOKEN:[[id number]]],PROVIDER:[TOKEN:[[id number]]]

## 2019-07-11 NOTE — DISCHARGE NOTE PROVIDER - NSDCACTIVITY_GEN_ALL_CORE
Activity allowed as recommended by physician and therapists./Walking - Outdoors allowed/Showering allowed/Bathing allowed/Walking - Indoors allowed/Stairs allowed

## 2019-07-11 NOTE — DISCHARGE NOTE PROVIDER - NSDCCPCAREPLAN_GEN_ALL_CORE_FT
PRINCIPAL DISCHARGE DIAGNOSIS  Diagnosis: Brain mass  Assessment and Plan of Treatment: Patient will continue with her medications and therapy. She will follow up with her oncologist and surgeon in 1-2 weeks upon discharge.      SECONDARY DISCHARGE DIAGNOSES  Diagnosis: Lung mass  Assessment and Plan of Treatment: Patient will follow up with her oncologist in 1-2 weeks upon discharge.    Diagnosis: Elevated alanine aminotransferase (ALT) level  Assessment and Plan of Treatment: Patient will continue to follow up with her primary care doctor in 1-2 weeks upon discharge.

## 2019-07-11 NOTE — PROGRESS NOTE ADULT - SUBJECTIVE AND OBJECTIVE BOX
Subjective: No acute events overnight. Patient reports that she is doing well. Denies new sx or concerns.     REVIEW OF SYMPTOMS  Pertinent in the last 24 hours: Neurological deficits    VITALS  Vital Signs Last 24 Hrs  T(C): 36.5 (11 Jul 2019 08:07), Max: 36.9 (10 Jul 2019 20:47)  T(F): 97.7 (11 Jul 2019 08:07), Max: 98.4 (10 Jul 2019 20:47)  HR: 53 (11 Jul 2019 08:07) (53 - 56)  BP: 130/70 (11 Jul 2019 08:07) (114/72 - 130/70)  BP(mean): --  RR: 14 (11 Jul 2019 08:07) (14 - 14)  SpO2: 96% (11 Jul 2019 08:07) (96% - 98%)      PHYSICAL EXAM  Constitutional - NAD, Comfortable  HEENT -  Left temporal scalp incision--clean, no drainage.  Healing well. clear sutures.   Chest - CTA bilaterally, no increased work of breathing  Cardiovascular - RRR, S1S2,   Abdomen - BS+, Soft, NTND  Extremities - No edema, No calf tenderness   Neurologic Exam -    Stable                Cognitive - Awake, Alert, AAO to self, place, date, year, situation     Cranial Nerves - CN 2-12 intact     left arm and leg 4+/5 otherwise 5/5 throughout     Psychiatric - Mood stable, Affect WNL      RECENT LABS                        12.8   16.73 )-----------( 241      ( 11 Jul 2019 06:15 )             38.7     07-11    138  |  102  |  23  ----------------------------<  97  4.2   |  30  |  0.86    Ca    8.6      11 Jul 2019 06:15    TPro  5.8<L>  /  Alb  2.7<L>  /  TBili  0.3  /  DBili  x   /  AST  20  /  ALT  52<H>  /  AlkPhos  80  07-11            RADIOLOGY/OTHER RESULTS      MEDICATIONS  (STANDING):  dexamethasone     Tablet 2 milliGRAM(s) Oral every 8 hours  dexamethasone     Tablet   Oral   docusate sodium 100 milliGRAM(s) Oral two times a day  levETIRAcetam 500 milliGRAM(s) Oral two times a day  pantoprazole    Tablet 40 milliGRAM(s) Oral before breakfast  polyethylene glycol 3350 17 Gram(s) Oral daily    MEDICATIONS  (PRN):  acetaminophen   Tablet .. 650 milliGRAM(s) Oral every 6 hours PRN Temp greater or equal to 38C (100.4F), Mild Pain (1 - 3)  meclizine 25 milliGRAM(s) Oral four times a day PRN Dizziness  ondansetron   Disintegrating Tablet 4 milliGRAM(s) Oral three times a day PRN Nausea and/or Vomiting  senna 2 Tablet(s) Oral at bedtime PRN Constipation

## 2019-07-12 ENCOUNTER — TRANSCRIPTION ENCOUNTER (OUTPATIENT)
Age: 69
End: 2019-07-12

## 2019-07-12 VITALS
TEMPERATURE: 98 F | HEART RATE: 61 BPM | RESPIRATION RATE: 14 BRPM | OXYGEN SATURATION: 98 % | SYSTOLIC BLOOD PRESSURE: 102 MMHG | DIASTOLIC BLOOD PRESSURE: 63 MMHG

## 2019-07-12 PROCEDURE — 99232 SBSQ HOSP IP/OBS MODERATE 35: CPT

## 2019-07-12 RX ORDER — CALCIUM CARBONATE 500(1250)
1 TABLET ORAL
Qty: 0 | Refills: 0 | DISCHARGE

## 2019-07-12 RX ADMIN — Medication 2 MILLIGRAM(S): at 17:35

## 2019-07-12 RX ADMIN — Medication 2 MILLIGRAM(S): at 06:24

## 2019-07-12 RX ADMIN — PANTOPRAZOLE SODIUM 40 MILLIGRAM(S): 20 TABLET, DELAYED RELEASE ORAL at 06:24

## 2019-07-12 RX ADMIN — LEVETIRACETAM 500 MILLIGRAM(S): 250 TABLET, FILM COATED ORAL at 17:35

## 2019-07-12 RX ADMIN — LEVETIRACETAM 500 MILLIGRAM(S): 250 TABLET, FILM COATED ORAL at 06:24

## 2019-07-12 NOTE — DISCHARGE NOTE NURSING/CASE MANAGEMENT/SOCIAL WORK - NSDCFUADDAPPT_GEN_ALL_CORE_FT
Please follow up with your PCP, Dr. Oleg Rojas.  His phone number is [phone].  You stated you would like to make your own appointment.  Please follow up with your doctor within a week to 10 days.    An appointment has been made for you to see Dr. Nel Ivey on August 22nd, 2019 at 2pm.  The appointment is at [facility], [address] [place] [address], [phone].

## 2019-07-12 NOTE — PROGRESS NOTE ADULT - ATTENDING COMMENTS
Pt. seen this AM.  Agree with documentation above as per resident. Patient medically stable. Making progress towards rehab goals.      at bedside.  He and pt. asking if she can be discharged home on Friday.  Will d/w pt's therapists--will determine based on her function today.
Pt. seen with resident.  Agree with documentation above as per resident with amendments made as appropriate. Patient medically stable. Making progress towards rehab goals.
Pt. seen.  Agree with documentation above as per resident. Patient medically stable. Making progress towards rehab goals.      at bedside.  discussed rehab plan of care, ELOS, and discharge expectations.
Pt. seen with resident.  Agree with documentation above as per resident with amendments made as appropriate. Patient medically stable. Making progress towards rehab goals.     Stable for discharge home tomorrow.

## 2019-07-12 NOTE — CHART NOTE - NSCHARTNOTEFT_GEN_A_CORE
NUTRITION FOLLOW UP  HPI: 68F with hx of CTS, former smoker quit 7-8 years ago, wrist fracture in right hand, with recent admission from 6/24-6/27 for newly diagnosed NSCLC with brain metastasis, who was re-admitted to [facility] on 6/29/19 for nausea, dizziness and inability to ambulate s/p aspiration drainage of cyst/mass on 7/2/19 being admitted here for rehab with left sided weakness, Gait Instability, ADL impairments and Functional impairments.    SOURCE: Patient [X)   Family [ ]    Medical Record (X)    DIET: Regular + Enlive QD    Pt tolerating diet and eating well. During rounding yesterday, pt noted with surplus of Ensure supplements at bedside- suggest reducing supplement to once daily as pt observed with good appetite: % po intake per flow sheets. Denied GI distress. Last BM 7/11.      CURRENT WEIGHT:   (7/5) 153lbs   (7/8) 137.1lbs   ?? questionable accuracy    PERTINENT MEDS:   Pertinent Medications: MEDICATIONS  (STANDING):  dexamethasone     Tablet 2 milliGRAM(s) Oral two times a day  dexamethasone     Tablet   Oral   docusate sodium 100 milliGRAM(s) Oral two times a day  levETIRAcetam 500 milliGRAM(s) Oral two times a day  pantoprazole    Tablet 40 milliGRAM(s) Oral before breakfast  polyethylene glycol 3350 17 Gram(s) Oral daily    MEDICATIONS  (PRN):  acetaminophen   Tablet .. 650 milliGRAM(s) Oral every 6 hours PRN Temp greater or equal to 38C (100.4F), Mild Pain (1 - 3)  meclizine 25 milliGRAM(s) Oral four times a day PRN Dizziness  ondansetron   Disintegrating Tablet 4 milliGRAM(s) Oral three times a day PRN Nausea and/or Vomiting  senna 2 Tablet(s) Oral at bedtime PRN Constipation      PERTINENT LABS:  07-11 Na138 mmol/L Glu 97 mg/dL K+ 4.2 mmol/L Cr  0.86 mg/dL BUN 23 mg/dL 07-11 Alb 2.7 g/dL<L> 07-04 [id number] 5.7 %<H>      SKIN:  right head sutures  EDEMA: none  LAST BM:  7/11    ESTIMATED NEEDS:   [X] no change since previous assessment  [ ] recalculated:     PREVIOUS NUTRITION DIAGNOSIS:  Increased nutrient needs- receiving Ensure Enlive daily + eating % at meals     NUTRITION DIAGNOSIS is :  (X)  Ongoing      (    ) Resolved,  RD will follow as per nutrition department protocol.    NEW NUTRITION DIAGNOSIS: N/A    NUTRITION RECOMMENDATIONS:   1. Continue current nutrition plan of care (spoke with MD to reduce Ensure to once daily)  2. Obtain new weight for accuracy     MONITORING AND EVALUATION:   1. Tolerance to diet prescription   2. PO intake  3. Weights  4. Labs  5. Follow Up per protocol     RD to remain available  [place] Maloney RDN   Pager #[phone]

## 2019-07-12 NOTE — DISCHARGE NOTE NURSING/CASE MANAGEMENT/SOCIAL WORK - NSDCDPATPORTLINK_GEN_ALL_CORE
You can access the [facility] Patient Portal, offered by [facility], by registering with the following website: [email or web address]

## 2019-07-12 NOTE — PROGRESS NOTE ADULT - SUBJECTIVE AND OBJECTIVE BOX
Subjective: No acute events overnight. Patient reports that she is doing well. Denies new sx or concerns.     REVIEW OF SYMPTOMS  [X] Constitutional WNL     [X] Cardio WNL            [X] Resp WNL           [X] GI WNL                      [X]  WNL                 [X] Heme WNL              [X] Endo WNL                  [X] Skin WNL               [X] MSK WNL            [X] Neuro WNL                  [X] Cognitive WNL        [X] Psych WNL      VITALS  Vital Signs Last 24 Hrs  T(C): 36.6 (12 Jul 2019 08:17), Max: 37 (11 Jul 2019 20:11)  T(F): 97.8 (12 Jul 2019 08:17), Max: 98.6 (11 Jul 2019 20:11)  HR: 52 (12 Jul 2019 08:17) (52 - 55)  BP: 120/69 (12 Jul 2019 08:17) (120/69 - 123/72)  BP(mean): --  RR: 14 (12 Jul 2019 08:17) (14 - 14)  SpO2: 98% (12 Jul 2019 08:17) (97% - 98%)      PHYSICAL EXAM  Constitutional - NAD, Comfortable  HEENT - NCAT, EOMI  Chest - CTA bilaterally, No wheeze, No rhonchi, No crackles  Cardiovascular - RRR, S1S2, No murmurs  Abdomen - BS+, Soft, NTND  Extremities - No edema, No calf tenderness   Neurologic Exam -                    Cognitive - Awake, Alert, AAO to self, place, date, year, situation     Communication - Fluent, No dysarthria     Cranial Nerves - CN 2-12 intact     Motor - No focal deficits                    LEFT    UE - ShAB 5/5, EF 5/5, EE 5/5, WE 5/5,  5/5                    RIGHT UE - ShAB 5/5, EF 5/5, EE 5/5, WE 5/5,  5/5                    LEFT    LE - HF 5/5, KE 5/5, DF 5/5, PF 5/5                    RIGHT LE - HF 5/5, KE 5/5, DF 5/5, PF 5/5        Sensory - Intact to LT     Reflexes - DTR Intact, No primitive reflexive     Coordination - FTN intact  Psychiatric - Mood stable, Affect WNL  Skin -   Wounds -    FUNCTIONAL PROGRESS  Gait - EVAL  ADLs - EVAL   Transfers - EVAL  Functional transfer - EVAL    RECENT LABS                        12.8   16.73 )-----------( 241      ( 11 Jul 2019 06:15 )             38.7     07-11    138  |  102  |  23  ----------------------------<  97  4.2   |  30  |  0.86    Ca    8.6      11 Jul 2019 06:15    TPro  5.8<L>  /  Alb  2.7<L>  /  TBili  0.3  /  DBili  x   /  AST  20  /  ALT  52<H>  /  AlkPhos  80  07-11            RADIOLOGY/OTHER RESULTS      MEDICATIONS  (STANDING):  dexamethasone     Tablet 2 milliGRAM(s) Oral two times a day  dexamethasone     Tablet   Oral   docusate sodium 100 milliGRAM(s) Oral two times a day  levETIRAcetam 500 milliGRAM(s) Oral two times a day  pantoprazole    Tablet 40 milliGRAM(s) Oral before breakfast  polyethylene glycol 3350 17 Gram(s) Oral daily    MEDICATIONS  (PRN):  acetaminophen   Tablet .. 650 milliGRAM(s) Oral every 6 hours PRN Temp greater or equal to 38C (100.4F), Mild Pain (1 - 3)  meclizine 25 milliGRAM(s) Oral four times a day PRN Dizziness  ondansetron   Disintegrating Tablet 4 milliGRAM(s) Oral three times a day PRN Nausea and/or Vomiting  senna 2 Tablet(s) Oral at bedtime PRN Constipation Subjective: No acute events overnight. Patient reports that she is doing well. Denies new sx or concerns.     REVIEW OF SYMPTOMS  Pertinent in the last 24 hours: Neurological deficits    VITALS  Vital Signs Last 24 Hrs  T(C): 36.6 (12 Jul 2019 08:17), Max: 37 (11 Jul 2019 20:11)  T(F): 97.8 (12 Jul 2019 08:17), Max: 98.6 (11 Jul 2019 20:11)  HR: 52 (12 Jul 2019 08:17) (52 - 55)  BP: 120/69 (12 Jul 2019 08:17) (120/69 - 123/72)  BP(mean): --  RR: 14 (12 Jul 2019 08:17) (14 - 14)  SpO2: 98% (12 Jul 2019 08:17) (97% - 98%)      PHYSICAL EXAM  Constitutional - NAD, Comfortable  HEENT -  Left temporal scalp incision--clean, no drainage.  Healing well. clear sutures.   Chest - CTA bilaterally, no increased work of breathing  Cardiovascular - RRR, S1S2,   Abdomen - BS+, Soft, NTND  Extremities - No edema, No calf tenderness   Neurologic Exam -    Stable                Cognitive - Awake, Alert, AAO to self, place, date, year, situation     Cranial Nerves - CN 2-12 intact     left arm and leg 4+/5 otherwise 5/5 throughout     Psychiatric - Mood stable, Affect WNL      RECENT LABS                        12.8   16.73 )-----------( 241      ( 11 Jul 2019 06:15 )             38.7     07-11    138  |  102  |  23  ----------------------------<  97  4.2   |  30  |  0.86    Ca    8.6      11 Jul 2019 06:15    TPro  5.8<L>  /  Alb  2.7<L>  /  TBili  0.3  /  DBili  x   /  AST  20  /  ALT  52<H>  /  AlkPhos  80  07-11            RADIOLOGY/OTHER RESULTS      MEDICATIONS  (STANDING):  dexamethasone     Tablet 2 milliGRAM(s) Oral two times a day  dexamethasone     Tablet   Oral   docusate sodium 100 milliGRAM(s) Oral two times a day  levETIRAcetam 500 milliGRAM(s) Oral two times a day  pantoprazole    Tablet 40 milliGRAM(s) Oral before breakfast  polyethylene glycol 3350 17 Gram(s) Oral daily    MEDICATIONS  (PRN):  acetaminophen   Tablet .. 650 milliGRAM(s) Oral every 6 hours PRN Temp greater or equal to 38C (100.4F), Mild Pain (1 - 3)  meclizine 25 milliGRAM(s) Oral four times a day PRN Dizziness  ondansetron   Disintegrating Tablet 4 milliGRAM(s) Oral three times a day PRN Nausea and/or Vomiting  senna 2 Tablet(s) Oral at bedtime PRN Constipation

## 2019-07-12 NOTE — PROGRESS NOTE ADULT - ASSESSMENT
ASSESSMENT/PLAN  68F with hx of CTS, former smoker quit 7-8 years ago, wrist fracture in right hand, with recent admission from 6/24-6/27 for newly diagnosed NSCLC with brain metastasis, who was re-admitted to [facility] on 6/29/19 for nausea, dizziness and inability to ambulate s/p aspiration drainage of cyst/mass on 7/2/19 being admitted here for rehab with left sided weakness, Gait Instability, ADL impairments and Functional impairments.    COMORBIDITES/ACTIVE MEDICAL ISSUES     Gait Instability, ADL impairments and Functional impairments:   -Continue comprehensive Rehab Program of PT/OT/SLP    NSCLC of the RLL with brain metastasis: s/p cystic drainage  -Continue with dexamethasone taper as per OSH: 2mg q6h for 3 days, then 2mg q8h for 3 days, then 2mg BID until outpatient follow  -Continue with keppra for seizure ppx   -Meclizine prn for dizziness  -Zofran prn for nausea  -Continue with protonix while on steroids  -Follow up with NSGY upon discharge  -Follow up with [facility] as an outpatient  -Patient may shower--advised to be cautious near incision site.     Elevated ALT: Patient denies abdominal sx  -Check CMP in am    Pain Mgmt - Tylenol PRN  GI/Bowel Mgmt -  Continent c/w Colace, Senna,  /Bladder Mgmt - Monitor output    FEN   - Diet - Regular + Thins      Precautions / PROPHYLAXIS:   - Falls, Seizure   - Pressure injury/Skin: Turn Q2hrs while in bed, OOB to Chair, PT/OT    - DVT: Not on chemical prophylaxis given recent surgery, SCDs, TEDs
68F with hx of CTS, former smoker quit 7-8 years ago, wrist fracture in right hand, with recent admission from 6/24-6/27 for newly diagnosed NSCLC with brain metastasis, who was re-admitted to [facility] on 6/29/19 for nausea, dizziness and inability to ambulate s/p aspiration drainage of cyst/mass on 7/2/19 being admitted here for rehab with Gait Instability, ADL impairments and Functional impairments.        #NSCLC of the RLL with brain metastasis, s/p cystic drainage w/ gait instability and ADL / functional impairments  PT/OT/SLP as tolerated  dexamethasone taper with PPI     #Seizure ppx  continue with keppra     #Steroid induced leukocytosis  afebrile, non-toxic appearing  continue to monitor      #Dizziness  stable, meclizine prn     #Sinus bradycardia  asymptomatic        DVT/GI ppx
68F with hx of CTS, former smoker quit 7-8 years ago, wrist fracture in right hand, with recent admission from 6/24-6/27 for newly diagnosed NSCLC with brain metastasis, who was re-admitted to [facility] on 6/29/19 for nausea, dizziness and inability to ambulate s/p aspiration drainage of cyst/mass on 7/2/19 being admitted here for rehab with Gait Instability, ADL impairments and Functional impairments.        #NSCLC of the RLL with brain metastasis, s/p cystic drainage w/ gait instability and ADL / functional impairments  PT/OT/SLP as tolerated  dexamethasone taper with PPI     #Seizure ppx  continue with keppra     #Steroid induced leukocytosis  afebrile, non-toxic appearing  continue to monitor      #Dizziness  stable, meclizine prn     #Sinus bradycardia  asymptomatic        DVT/GI ppx
68F with hx of CTS, former smoker quit 7-8 years ago, wrist fracture in right hand, with recent admission from 6/24-6/27 for newly diagnosed NSCLC with brain metastasis, who was re-admitted to [facility] on 6/29/19 for nausea, dizziness and inability to ambulate s/p aspiration drainage of cyst/mass on 7/2/19 being admitted here for rehab with Gait Instability, ADL impairments and Functional impairments.        #NSCLC of the RLL with brain metastasis, s/p cystic drainage w/ gait instability and ADL / functional impairments  PT/OT/SLP as tolerated  dexamethasone taper with PPI     #Seizure ppx  continue with keppra     #Steroid induced leukocytosis  afebrile, non-toxic appearing  continue to monitor      #Dizziness  stable, meclizine prn     #Sinus bradycardia  asymptomatic        DVT/GI ppx
ASSESSMENT/PLAN  68F with hx of CTS, former smoker quit 7-8 years ago, wrist fracture in right hand, with recent admission from 6/24-6/27 for newly diagnosed NSCLC with brain metastasis, who was re-admitted to [facility] on 6/29/19 for nausea, dizziness and inability to ambulate s/p aspiration drainage of cyst/mass on 7/2/19 being admitted here for rehab with left sided weakness, Gait Instability, ADL impairments and Functional impairments.    COMORBIDITES/ACTIVE MEDICAL ISSUES     Gait Instability, ADL impairments and Functional impairments:   -Continue comprehensive Rehab Program of PT/OT/SLP    NSCLC of the RLL with brain metastasis: s/p cystic drainage  -Continue with dexamethasone taper as per OSH: 2mg q6h for 3 days, then 2mg q8h for 3 days, then 2mg BID until outpatient follow  -Continue with keppra for seizure ppx   -Meclizine prn for dizziness  -Zofran prn for nausea  -Continue with protonix while on steroids  -Follow up with NSGY upon discharge  -Follow up with [facility] as an outpatient  -Patient may shower--advised to be cautious near incision site.     Elevated ALT: Improving 63<-83. Patient denies abdominal sx.   -Continue to monitor weekly labs    Pain Mgmt - Tylenol PRN  GI/Bowel Mgmt -  Continent c/w Colace, Senna,  /Bladder Mgmt - Monitor output    FEN   - Diet - Regular + Thins      Precautions / PROPHYLAXIS:   - Falls, Seizure   - Pressure injury/Skin: Turn Q2hrs while in bed, OOB to Chair, PT/OT    - DVT: Not on chemical prophylaxis given recent surgery, SCDs, TEDs
ASSESSMENT/PLAN  68F with hx of CTS, former smoker quit 7-8 years ago, wrist fracture in right hand, with recent admission from 6/24-6/27 for newly diagnosed NSCLC with brain metastasis, who was re-admitted to [facility] on 6/29/19 for nausea, dizziness and inability to ambulate s/p aspiration drainage of cyst/mass on 7/2/19 being admitted here for rehab with left sided weakness, Gait Instability, ADL impairments and Functional impairments.    COMORBIDITES/ACTIVE MEDICAL ISSUES     Gait Instability, ADL impairments and Functional impairments:   -Continue comprehensive Rehab Program of PT/OT/SLP    NSCLC of the RLL with brain metastasis: s/p cystic drainage  -Continue with dexamethasone taper as per OSH: 2mg q6h for 3 days, then 2mg q8h for 3 days, then 2mg BID until outpatient follow  -Continue with keppra for seizure ppx   -Meclizine prn for dizziness  -Zofran prn for nausea  -Continue with protonix while on steroids  -Follow up with NSGY upon discharge  -Follow up with [facility] as an outpatient  -Patient may shower--advised to be cautious near incision site.     Elevated ALT: Improving 63<-83. Patient denies abdominal sx.   -Continue to monitor weekly labs    Pain Mgmt - Tylenol PRN  GI/Bowel Mgmt -  Continent c/w Colace, Senna,  /Bladder Mgmt - Monitor output    FEN   - Diet - Regular + Thins      Precautions / PROPHYLAXIS:   - Falls, Seizure   - Pressure injury/Skin: Turn Q2hrs while in bed, OOB to Chair, PT/OT    - DVT: Not on chemical prophylaxis given recent surgery, SCDs, TEDs    Dispo:   -Patient discussed during IDT on 7/11/19  -Patient progressing well through therapy. Currently CG with transfers. CG/Min assist with gait. Higher level cognitive deficits  -Family will come in for training today  -Plan for discharge home on 7/13/19
ASSESSMENT/PLAN  68F with hx of CTS, former smoker quit 7-8 years ago, wrist fracture in right hand, with recent admission from 6/24-6/27 for newly diagnosed NSCLC with brain metastasis, who was re-admitted to [facility] on 6/29/19 for nausea, dizziness and inability to ambulate s/p aspiration drainage of cyst/mass on 7/2/19 being admitted here for rehab with left sided weakness, Gait Instability, ADL impairments and Functional impairments.    COMORBIDITES/ACTIVE MEDICAL ISSUES     Gait Instability, ADL impairments and Functional impairments:   -Start Comprehensive Rehab Program of PT/OT/SLP    NSCLC of the RLL with brain metastasis: s/p cystic drainage  -Continue with dexamethasone taper as per OSH: 2mg q6h for 3 days, then 2mg q8h for 3 days, then 2mg BID until outpatient follow  -Continue with keppra for seizure ppx   -Meclizine prn for dizziness  -Zofran prn for nausea  -Continue with protonix while on steroids  -Follow up with NSGY upon discharge  -Follow up with [facility] as an outpatient    Pt. may shower--advised to be cautious near incision site.         Pain Mgmt - Tylenol PRN  GI/Bowel Mgmt -  Continent c/w Colace, Senna,  /Bladder Mgmt - Monitor output    FEN   - Diet - Regular + Thins    - Dysphagia  SLP - evaluation and treatment    Precautions / PROPHYLAXIS:   - Falls, Seizure   - Pressure injury/Skin: Turn Q2hrs while in bed, OOB to Chair, PT/OT    - DVT: Not on chemical prophylaxis given recent surgery, SCDs, TEDs
ASSESSMENT/PLAN  68F with hx of CTS, former smoker quit 7-8 years ago, wrist fracture in right hand, with recent admission from 6/24-6/27 for newly diagnosed NSCLC with brain metastasis, who was re-admitted to [facility] on 6/29/19 for nausea, dizziness and inability to ambulate s/p aspiration drainage of cyst/mass on 7/2/19 being admitted here for rehab with left sided weakness, Gait Instability, ADL impairments and Functional impairments.    COMORBIDITES/ACTIVE MEDICAL ISSUES     Gait Instability, ADL impairments and Functional impairments:   -Continue comprehensive Rehab Program of PT/OT/SLP    NSCLC of the RLL with brain metastasis: s/p cystic drainage  -Continue with dexamethasone taper as per OSH: 2mg q6h for 3 days, then 2mg q8h for 3 days, then 2mg BID until outpatient follow  -Continue with keppra for seizure ppx   -Meclizine prn for dizziness  -Zofran prn for nausea  -Continue with protonix while on steroids  -Follow up with NSGY upon discharge  -Follow up with [facility] as an outpatient  -Patient may shower--advised to be cautious near incision site.     Elevated ALT: Improving 63<-83. Patient denies abdominal sx.   -Continue to monitor weekly labs    Pain Mgmt - Tylenol PRN  GI/Bowel Mgmt -  Continent c/w Colace, Senna,  /Bladder Mgmt - Monitor output    FEN   - Diet - Regular + Thins      Precautions / PROPHYLAXIS:   - Falls, Seizure   - Pressure injury/Skin: Turn Q2hrs while in bed, OOB to Chair, PT/OT    - DVT: Not on chemical prophylaxis given recent surgery, SCDs, TEDs    Dispo:   -Patient discussed during IDT on 7/11/19  -Patient progressing well through therapy. Currently CG with transfers. CG/Min assist with gait. Higher level cognitive deficits  -Family will come in for training today  -Plan for discharge home on 7/13/19

## 2019-07-13 PROCEDURE — 99238 HOSP IP/OBS DSCHRG MGMT 30/<: CPT

## 2019-07-13 RX ADMIN — Medication 2 MILLIGRAM(S): at 05:32

## 2019-07-13 RX ADMIN — PANTOPRAZOLE SODIUM 40 MILLIGRAM(S): 20 TABLET, DELAYED RELEASE ORAL at 05:32

## 2019-07-13 RX ADMIN — LEVETIRACETAM 500 MILLIGRAM(S): 250 TABLET, FILM COATED ORAL at 05:32

## 2019-07-13 RX ADMIN — Medication 100 MILLIGRAM(S): at 05:32

## 2019-07-13 NOTE — PROGRESS NOTE ADULT - REASON FOR ADMISSION
Brain mass resection

## 2019-07-13 NOTE — PROGRESS NOTE ADULT - PROVIDER SPECIALTY LIST ADULT
Hospitalist
Neurology
Rehab Medicine

## 2019-07-13 NOTE — PROGRESS NOTE ADULT - SUBJECTIVE AND OBJECTIVE BOX
Chief complaint: no new complaints, ready for discharge    Patient is a 68y old  Female who presents with a chief complaint of Brain mass resection (12 Jul 2019 10:45)    PAST MEDICAL & SURGICAL HISTORY:  Brain tumor  Lung tumor  Carpal tunnel syndrome      PHYSICAL EXAM  Constitutional - NAD, Comfortable  HEENT - NCAT, EOMI  Neck - Supple, No limited ROM  Chest - CTA bilaterally  Cardiovascular - RRR, S1S2, No murmurs  Abdomen - BS+, Soft, NTND  Extremities - No C/C/E, No calf tenderness   Neurologic Exam -                      No new focal deficits                         CURRENT MEDICATIONS    MEDICATIONS  (STANDING):  dexamethasone     Tablet 2 milliGRAM(s) Oral two times a day  dexamethasone     Tablet   Oral   docusate sodium 100 milliGRAM(s) Oral two times a day  levETIRAcetam 500 milliGRAM(s) Oral two times a day  pantoprazole    Tablet 40 milliGRAM(s) Oral before breakfast  polyethylene glycol 3350 17 Gram(s) Oral daily    MEDICATIONS  (PRN):  acetaminophen   Tablet .. 650 milliGRAM(s) Oral every 6 hours PRN Temp greater or equal to 38C (100.4F), Mild Pain (1 - 3)  meclizine 25 milliGRAM(s) Oral four times a day PRN Dizziness  ondansetron   Disintegrating Tablet 4 milliGRAM(s) Oral three times a day PRN Nausea and/or Vomiting  senna 2 Tablet(s) Oral at bedtime PRN Constipation    ASSESSMENT & PLAN      Patient is being discharged home with home care today.  Discharge instructions were discussed with patient and family, all current medications were sent to the pharmacy. Patient and family were educated on importance of medication compliance,  continued  care with PMD and follow-up care with the specialists in the community. Safety and fall risk precautions  were discussed in detail, counseled on healthy life style modifications.  All questions were answered to their satisfaction.

## 2019-07-15 ENCOUNTER — INBOUND DOCUMENT (OUTPATIENT)
Age: 69
End: 2019-07-15

## 2019-07-15 ENCOUNTER — APPOINTMENT (OUTPATIENT)
Dept: NEUROSURGERY | Facility: CLINIC | Age: 69
End: 2019-07-15
Payer: MEDICARE

## 2019-07-15 VITALS
DIASTOLIC BLOOD PRESSURE: 75 MMHG | SYSTOLIC BLOOD PRESSURE: 130 MMHG | HEART RATE: 66 BPM | BODY MASS INDEX: 23.04 KG/M2 | WEIGHT: 130 LBS | HEIGHT: 63 IN | TEMPERATURE: 98.7 F

## 2019-07-15 DIAGNOSIS — C79.31 MALIGNANT NEOPLASM OF UNSPECIFIED PART OF UNSPECIFIED BRONCHUS OR LUNG: ICD-10-CM

## 2019-07-15 DIAGNOSIS — C34.90 MALIGNANT NEOPLASM OF UNSPECIFIED PART OF UNSPECIFIED BRONCHUS OR LUNG: ICD-10-CM

## 2019-07-15 PROCEDURE — 99024 POSTOP FOLLOW-UP VISIT: CPT

## 2019-07-17 PROBLEM — C34.90 LUNG CANCER METASTATIC TO BRAIN: Status: ACTIVE | Noted: 2019-07-17

## 2019-07-17 NOTE — HISTORY OF PRESENT ILLNESS
[FreeTextEntry1] : Ms. Du presents for follow-up s/p discharge from [facility]. She is doing well from a post-operative perspecitve.  She endorses improvement in her headaches and balance.  The patient denies any new numbness, tingling, or weakness.   She is pending an evaluation at [facility] this Wednesday.  See office intake form for full ROS.

## 2019-07-17 NOTE — ASSESSMENT
[FreeTextEntry1] : Ms. Du presents for follow-up with interval history as above.  I will see the patient back at the 1 month post-op ko.  She and her  know to call the office with any new or concerning symptoms in the interim.

## 2019-07-17 NOTE — PHYSICAL EXAM
[General Appearance - Alert] : alert [General Appearance - In No Acute Distress] : in no acute distress [General Appearance - Well Nourished] : well nourished [General Appearance - Well Developed] : well developed [General Appearance - Well-Appearing] : healthy appearing [Longitudinal] : longitudinal [Clean] : clean [Dry] : dry [Healing Well] : healing well [Intact] : intact [No Drainage] : without drainage [Normal Skin] : normal [Erythema] : not erythematous [Warm] : not warm [Fluctuant] : not fluctuant [Oriented To Time, Place, And Person] : oriented to person, place, and time [Person] : oriented to person [Place] : oriented to place [Time] : oriented to time [Short Term Intact] : short term memory intact [Concentration Intact] : normal concentrating ability [Fluency] : fluency intact [Cranial Nerves Optic (II)] : visual acuity intact bilaterally,  pupils equal round and reactive to light [Cranial Nerves Oculomotor (III)] : extraocular motion intact [Cranial Nerves Facial (VII)] : face symmetrical [Cranial Nerves Hypoglossal (XII)] : there was no tongue deviation with protrusion [Motor Tone] : muscle tone was normal in all four extremities [Motor Strength] : muscle strength was normal in all four extremities [Sensation Tactile Decrease] : light touch was intact [Abnormal Walk] : normal gait

## 2019-07-18 PROCEDURE — 92507 TX SP LANG VOICE COMM INDIV: CPT

## 2019-07-18 PROCEDURE — 97163 PT EVAL HIGH COMPLEX 45 MIN: CPT

## 2019-07-18 PROCEDURE — 97112 NEUROMUSCULAR REEDUCATION: CPT

## 2019-07-18 PROCEDURE — 80053 COMPREHEN METABOLIC PANEL: CPT

## 2019-07-18 PROCEDURE — 85027 COMPLETE CBC AUTOMATED: CPT

## 2019-07-18 PROCEDURE — 97535 SELF CARE MNGMENT TRAINING: CPT

## 2019-07-18 PROCEDURE — 97116 GAIT TRAINING THERAPY: CPT

## 2019-07-18 PROCEDURE — 92521 EVALUATION OF SPEECH FLUENCY: CPT

## 2019-07-18 PROCEDURE — 92610 EVALUATE SWALLOWING FUNCTION: CPT

## 2019-07-18 PROCEDURE — 97530 THERAPEUTIC ACTIVITIES: CPT

## 2019-07-18 PROCEDURE — 36415 COLL VENOUS BLD VENIPUNCTURE: CPT

## 2019-07-18 PROCEDURE — 97110 THERAPEUTIC EXERCISES: CPT

## 2019-08-22 ENCOUNTER — APPOINTMENT (OUTPATIENT)
Dept: NEUROSURGERY | Facility: CLINIC | Age: 69
End: 2019-08-22

## 2019-08-22 ENCOUNTER — APPOINTMENT (OUTPATIENT)
Dept: PHYSICAL MEDICINE AND REHAB | Facility: CLINIC | Age: 69
End: 2019-08-22

## 2019-12-03 ENCOUNTER — EMERGENCY (EMERGENCY)
Facility: HOSPITAL | Age: 69
LOS: 1 days | Discharge: HOME | End: 2019-12-03
Attending: EMERGENCY MEDICINE
Payer: MEDICARE

## 2019-12-03 VITALS
SYSTOLIC BLOOD PRESSURE: 116 MMHG | HEART RATE: 114 BPM | HEIGHT: 63 IN | DIASTOLIC BLOOD PRESSURE: 71 MMHG | WEIGHT: 130.07 LBS | TEMPERATURE: 98 F | OXYGEN SATURATION: 98 % | RESPIRATION RATE: 20 BRPM

## 2019-12-03 DIAGNOSIS — G56.00 CARPAL TUNNEL SYNDROME, UNSPECIFIED UPPER LIMB: Chronic | ICD-10-CM

## 2019-12-03 PROCEDURE — 99284 EMERGENCY DEPT VISIT MOD MDM: CPT

## 2019-12-03 NOTE — ED ADULT TRIAGE NOTE - CHIEF COMPLAINT QUOTE
Hx Lung and Brain CA p/w with seizure like activity as described by . states this morning she experienced a small episode of left arm shaking, followed by another small episode around 6pm and one final episode involving whole body "shaking" for appx ten minutes at 11pm. Post ictal upon EMS arrival, A&Ox4 at this time, neuro intact. currently on IV antibiotics via right chest wall port for diverticulitis

## 2019-12-04 VITALS
RESPIRATION RATE: 18 BRPM | OXYGEN SATURATION: 98 % | SYSTOLIC BLOOD PRESSURE: 114 MMHG | TEMPERATURE: 98 F | DIASTOLIC BLOOD PRESSURE: 72 MMHG | HEART RATE: 84 BPM

## 2019-12-04 LAB
ALBUMIN SERPL ELPH-MCNC: 4 G/DL — SIGNIFICANT CHANGE UP (ref 3.3–5.2)
ALP SERPL-CCNC: 56 U/L — SIGNIFICANT CHANGE UP (ref 40–120)
ALT FLD-CCNC: 12 U/L — SIGNIFICANT CHANGE UP
ANION GAP SERPL CALC-SCNC: 20 MMOL/L — HIGH (ref 5–17)
APTT BLD: 24.5 SEC — LOW (ref 27.5–36.3)
AST SERPL-CCNC: 22 U/L — SIGNIFICANT CHANGE UP
BASOPHILS # BLD AUTO: 0 K/UL — SIGNIFICANT CHANGE UP (ref 0–0.2)
BASOPHILS NFR BLD AUTO: 0 % — SIGNIFICANT CHANGE UP (ref 0–2)
BILIRUB SERPL-MCNC: <0.2 MG/DL — LOW (ref 0.4–2)
BLD GP AB SCN SERPL QL: SIGNIFICANT CHANGE UP
BUN SERPL-MCNC: 11 MG/DL — SIGNIFICANT CHANGE UP (ref 8–20)
CALCIUM SERPL-MCNC: 9.4 MG/DL — SIGNIFICANT CHANGE UP (ref 8.6–10.2)
CHLORIDE SERPL-SCNC: 100 MMOL/L — SIGNIFICANT CHANGE UP (ref 98–107)
CK SERPL-CCNC: 45 U/L — SIGNIFICANT CHANGE UP (ref 25–170)
CO2 SERPL-SCNC: 19 MMOL/L — LOW (ref 22–29)
CREAT SERPL-MCNC: 1 MG/DL — SIGNIFICANT CHANGE UP (ref 0.5–1.3)
EOSINOPHIL # BLD AUTO: 0.29 K/UL — SIGNIFICANT CHANGE UP (ref 0–0.5)
EOSINOPHIL NFR BLD AUTO: 2.7 % — SIGNIFICANT CHANGE UP (ref 0–6)
GLUCOSE SERPL-MCNC: 122 MG/DL — HIGH (ref 70–115)
HCT VFR BLD CALC: 39.8 % — SIGNIFICANT CHANGE UP (ref 34.5–45)
HGB BLD-MCNC: 12.7 G/DL — SIGNIFICANT CHANGE UP (ref 11.5–15.5)
IMM GRANULOCYTES NFR BLD AUTO: 0.6 % — SIGNIFICANT CHANGE UP (ref 0–1.5)
INR BLD: 1.03 RATIO — SIGNIFICANT CHANGE UP (ref 0.88–1.16)
LYMPHOCYTES # BLD AUTO: 19.5 % — SIGNIFICANT CHANGE UP (ref 13–44)
LYMPHOCYTES # BLD AUTO: 2.13 K/UL — SIGNIFICANT CHANGE UP (ref 1–3.3)
MCHC RBC-ENTMCNC: 31.9 GM/DL — LOW (ref 32–36)
MCHC RBC-ENTMCNC: 33.2 PG — SIGNIFICANT CHANGE UP (ref 27–34)
MCV RBC AUTO: 103.9 FL — HIGH (ref 80–100)
MONOCYTES # BLD AUTO: 1.18 K/UL — HIGH (ref 0–0.9)
MONOCYTES NFR BLD AUTO: 10.8 % — SIGNIFICANT CHANGE UP (ref 2–14)
NEUTROPHILS # BLD AUTO: 7.23 K/UL — SIGNIFICANT CHANGE UP (ref 1.8–7.4)
NEUTROPHILS NFR BLD AUTO: 66.4 % — SIGNIFICANT CHANGE UP (ref 43–77)
PLATELET # BLD AUTO: 251 K/UL — SIGNIFICANT CHANGE UP (ref 150–400)
POTASSIUM SERPL-MCNC: 3.9 MMOL/L — SIGNIFICANT CHANGE UP (ref 3.5–5.3)
POTASSIUM SERPL-SCNC: 3.9 MMOL/L — SIGNIFICANT CHANGE UP (ref 3.5–5.3)
PROT SERPL-MCNC: 6.8 G/DL — SIGNIFICANT CHANGE UP (ref 6.6–8.7)
PROTHROM AB SERPL-ACNC: 11.9 SEC — SIGNIFICANT CHANGE UP (ref 10–12.9)
RBC # BLD: 3.83 M/UL — SIGNIFICANT CHANGE UP (ref 3.8–5.2)
RBC # FLD: 12.6 % — SIGNIFICANT CHANGE UP (ref 10.3–14.5)
SODIUM SERPL-SCNC: 139 MMOL/L — SIGNIFICANT CHANGE UP (ref 135–145)
WBC # BLD: 10.9 K/UL — HIGH (ref 3.8–10.5)
WBC # FLD AUTO: 10.9 K/UL — HIGH (ref 3.8–10.5)

## 2019-12-04 PROCEDURE — 96375 TX/PRO/DX INJ NEW DRUG ADDON: CPT

## 2019-12-04 PROCEDURE — 99284 EMERGENCY DEPT VISIT MOD MDM: CPT | Mod: 25

## 2019-12-04 PROCEDURE — 96374 THER/PROPH/DIAG INJ IV PUSH: CPT

## 2019-12-04 PROCEDURE — 86850 RBC ANTIBODY SCREEN: CPT

## 2019-12-04 PROCEDURE — 70450 CT HEAD/BRAIN W/O DYE: CPT

## 2019-12-04 PROCEDURE — 80053 COMPREHEN METABOLIC PANEL: CPT

## 2019-12-04 PROCEDURE — 86901 BLOOD TYPING SEROLOGIC RH(D): CPT

## 2019-12-04 PROCEDURE — 82550 ASSAY OF CK (CPK): CPT

## 2019-12-04 PROCEDURE — 85730 THROMBOPLASTIN TIME PARTIAL: CPT

## 2019-12-04 PROCEDURE — 85027 COMPLETE CBC AUTOMATED: CPT

## 2019-12-04 PROCEDURE — 86900 BLOOD TYPING SEROLOGIC ABO: CPT

## 2019-12-04 PROCEDURE — 36415 COLL VENOUS BLD VENIPUNCTURE: CPT

## 2019-12-04 PROCEDURE — 85610 PROTHROMBIN TIME: CPT

## 2019-12-04 PROCEDURE — 70450 CT HEAD/BRAIN W/O DYE: CPT | Mod: 26

## 2019-12-04 RX ORDER — LEVETIRACETAM 250 MG/1
1000 TABLET, FILM COATED ORAL ONCE
Refills: 0 | Status: COMPLETED | OUTPATIENT
Start: 2019-12-04 | End: 2019-12-04

## 2019-12-04 RX ORDER — LEVETIRACETAM 250 MG/1
1 TABLET, FILM COATED ORAL
Qty: 60 | Refills: 0
Start: 2019-12-04 | End: 2020-01-02

## 2019-12-04 RX ADMIN — Medication 1 MILLIGRAM(S): at 00:12

## 2019-12-04 RX ADMIN — LEVETIRACETAM 400 MILLIGRAM(S): 250 TABLET, FILM COATED ORAL at 00:53

## 2019-12-04 NOTE — ED PROVIDER NOTE - OBJECTIVE STATEMENT
69 year old female with Hx of Brain CA, Lung CA presenting to the ED s/p tonic clonic seizure at home that she states she never had before. Pt reports she is not on chemo radiation anymore and planned to start immunotherapy followed by [facility]. denies fever. denies HA or neck pain. no chest pain or sob. no abd pain. no n/v/d. no urinary f/u/d. no back pain. no motor or sensory deficits. denies illicit drug use. no recent travel. no rash. no other acute issues symptoms or concerns

## 2019-12-04 NOTE — ED PROVIDER NOTE - PROGRESS NOTE DETAILS
Caled to bed for focal seizure, let arm shaking. s/p seizure pt was able to talk with no other complaints. Pt is on IV antibiotics Invanz for Diverticulitis.

## 2019-12-04 NOTE — ED ADULT NURSE REASSESSMENT NOTE - NS ED NURSE REASSESS COMMENT FT1
pt care assumed 0045, report received from SHELBY RN. Pt is resting in bed comfortably at this time, no apparent distress noted at this time. pt safety maintained. Pt denies any complaints at this time. pt educated on plan of care, plan of care taught back to RN. plan of care education deemed proficient through successful teach back. will continue to reeducate pt regarding plan of care.

## 2019-12-04 NOTE — ED PROVIDER NOTE - PATIENT PORTAL LINK FT
You can access the FollowMyHealth Patient Portal offered by [facility] by registering at the following website: [email or web address]. By joining [facility]’s FollowMyHealth portal, you will also be able to view your health information using other applications (apps) compatible with our system.

## 2019-12-04 NOTE — ED ADULT NURSE NOTE - OBJECTIVE STATEMENT
Pt comes in via EMS, s/p seizure at home witnessed by , pt is a cancer pt on treatment, pt states she was trying to get out of her recliner and fell back into stretcher,  witnessed seizure. Pt at this time is alert awake and conversational with MD Trujillo at bedside at this time for interventions. Critical care RN's RF, AC, KG at bedside for intervention. Pt is speaking with MD though arms are tremulous at this time. Priority CT called and pt taken to CT at this time.

## 2019-12-04 NOTE — ED ADULT NURSE NOTE - NSIMPLEMENTINTERV_GEN_ALL_ED
Implemented All Fall Risk Interventions:  [place] to call system. Call bell, personal items and telephone within reach. Instruct patient to call for assistance. Room bathroom lighting operational. Non-slip footwear when patient is off stretcher. Physically safe environment: no spills, clutter or unnecessary equipment. Stretcher in lowest position, wheels locked, appropriate side rails in place. Provide visual cue, wrist band, yellow gown, etc. Monitor gait and stability. Monitor for mental status changes and reorient to person, place, and time. Review medications for side effects contributing to fall risk. Reinforce activity limits and safety measures with patient and family.

## 2019-12-04 NOTE — ED PROVIDER NOTE - CLINICAL SUMMARY MEDICAL DECISION MAKING FREE TEXT BOX
pt with no compalints pt and pts  very uptodate on her care. meds reviewed and her keppra was stopped about a month ago for unknown reason possibly contributing to her seizure here and at home. pt never with any postictal perioed here in ED. they have oncological f/u at [facility] and they had a MRI brain outpt this past friday that was reviewed by [facility] team thus I do not feel she needs admission for MRI I will restart her keppra pt and pts  amendable to going home return precautions given. she is not septic , meningitic , no sing drug withdrawal no electrolyte abdnomalities. return precautions given .

## 2020-07-05 NOTE — OCCUPATIONAL THERAPY INITIAL EVALUATION ADULT - VISUAL ASSESSMENT: SCANNING
Carotid artery disease  s/p carotid bypass right side  S/P cataract surgery  both eyes  S/P cholecystectomy    S/P tubal ligation WFL

## 2020-10-01 NOTE — PATIENT PROFILE ADULT - NSPRESCRALCSCORE_GEN_A_NUR_CAL
8/13/2020  Future Appointments   Date Time Provider Abdirahman Craft   10/12/2020  9:30 AM KORIN Bustos - CNP [facility]   11/12/2020  9:00 AM DO CAYLA Florez [facility]   4/7/2021  9:00 AM [facility]

## 2022-02-10 NOTE — ED ADULT TRIAGE NOTE - IDEAL BODY WEIGHT(KG)
Pt's requesting to go to [address] on [facility] for future infusions. Patient's due March 10th.
Spoke with pt yesterday. Getting labs drawn today.
52

## 2022-02-13 NOTE — DISCHARGE NOTE PROVIDER - CARE PROVIDER_API CALL
Bert Talbot)  Neurosurgery  [facility] [address]  [place] [address]  Phone: [phone]  Fax: [phone]  Follow Up Time:     Pedro De Paz)  HematologyOncology; HospicePalliative Medicine; Internal Medicine  [address] [place] [address]  Phone: [phone]  Fax: [phone]  Follow Up Time:     Nel Ivey ()  Brain Injury Medicine; PhysicalRehab Medicine  [address] [place] [address]  Phone: [phone]  Fax: [phone]  Follow Up Time: Patient was informed of the reason for this intervention. Bert Talbot)  Neurosurgery  [facility], [address]  [place] [address]  Phone: [phone]  Fax: [phone]  Follow Up Time:     Pedro De Paz)  HematologyOncology; HospicePalliative Medicine; Internal Medicine  [address] [place] [address]  Phone: [phone]  Fax: [phone]  Follow Up Time:     Malaika Erazo ()  Brain Injury Medicine; PhysicalRehab Medicine  [address] [place] [address]  Phone: [phone]  Fax: [phone]  Follow Up Time:

## 2022-05-04 NOTE — ED ADULT NURSE NOTE - NS ED NOTE ABUSE SUSPICION NEGLECT YN
-- DO NOT REPLY / DO NOT REPLY ALL --  -- Message is from the [facility]--    General Patient Message      Reason for Call: Patient caregiver states that around the patient feeding tub is more red and hot. Caregiver states that it is getting worse. Please call to further assist     Caller Information       Type Contact Phone    05/04/2022 08:16 AM CDT Phone (Incoming) Linda  [phone]     Caregiver           Alternative phone number: [phone]    Turnaround time given to caller:   \"This message will be sent to [state Provider's name]. The clinical team will fulfill your request as soon as they review your message.\"     No

## 2023-08-24 NOTE — DISCHARGE NOTE PROVIDER - PROVIDER TOKENS
----- Message from Kimmy Dickens DO sent at 8/24/2023  2:41 PM CDT -----  Please let Thais know her Pap came back normal and negative for HPV   PROVIDER:[TOKEN:[[id number]]],PROVIDER:[TOKEN:[[id number]]]

## 2025-06-19 NOTE — H&P ADULT - REASON FOR ADMISSION
Left message for patient to call back regarding medication adherence.     Marisela Weiss CPQu[facility]. [facility] Techn[facility] Specialist  [phone]    
Brain mass